# Patient Record
Sex: FEMALE | Race: WHITE | NOT HISPANIC OR LATINO
[De-identification: names, ages, dates, MRNs, and addresses within clinical notes are randomized per-mention and may not be internally consistent; named-entity substitution may affect disease eponyms.]

---

## 2017-04-19 ENCOUNTER — APPOINTMENT (OUTPATIENT)
Dept: HEMATOLOGY ONCOLOGY | Facility: CLINIC | Age: 82
End: 2017-04-19

## 2017-04-21 ENCOUNTER — APPOINTMENT (OUTPATIENT)
Dept: HEMATOLOGY ONCOLOGY | Facility: CLINIC | Age: 82
End: 2017-04-21

## 2017-04-21 ENCOUNTER — LABORATORY RESULT (OUTPATIENT)
Age: 82
End: 2017-04-21

## 2017-04-21 VITALS
SYSTOLIC BLOOD PRESSURE: 154 MMHG | WEIGHT: 158 LBS | HEART RATE: 82 BPM | HEIGHT: 61.5 IN | DIASTOLIC BLOOD PRESSURE: 81 MMHG | BODY MASS INDEX: 29.45 KG/M2 | TEMPERATURE: 98 F

## 2017-04-21 LAB
HCT VFR BLD CALC: 40 %
HGB BLD-MCNC: 12.8 G/DL
MCHC RBC-ENTMCNC: 30.9 PG
MCHC RBC-ENTMCNC: 32.1 GM/DL
MCV RBC AUTO: 96.1 FL
PLATELET # BLD AUTO: 290 K/UL
RBC # BLD: 4.16 M/UL
RBC # FLD: 12.5 %
WBC # FLD AUTO: 8.2 K/UL

## 2017-04-24 LAB
ALBUMIN MFR SERPL ELPH: 49.2 %
ALBUMIN SERPL ELPH-MCNC: 4.2 G/DL
ALBUMIN SERPL-MCNC: 4 G/DL
ALBUMIN/GLOB SERPL: 1 RATIO
ALP BLD-CCNC: 60 U/L
ALPHA1 GLOB MFR SERPL ELPH: 3.8 %
ALPHA1 GLOB SERPL ELPH-MCNC: 0.3 G/DL
ALPHA2 GLOB MFR SERPL ELPH: 12.9 %
ALPHA2 GLOB SERPL ELPH-MCNC: 1 G/DL
ALT SERPL-CCNC: 22 U/L
ANION GAP SERPL CALC-SCNC: 18 MMOL/L
AST SERPL-CCNC: 20 U/L
B-GLOBULIN MFR SERPL ELPH: 9 %
B-GLOBULIN SERPL ELPH-MCNC: 0.7 G/DL
BILIRUB SERPL-MCNC: 0.5 MG/DL
BUN SERPL-MCNC: 33 MG/DL
CALCIUM SERPL-MCNC: 10.6 MG/DL
CHLORIDE SERPL-SCNC: 102 MMOL/L
CO2 SERPL-SCNC: 23 MMOL/L
CREAT SERPL-MCNC: 0.88 MG/DL
DEPRECATED KAPPA LC FREE/LAMBDA SER: 1.8 RATIO
DEPRECATED KAPPA LC FREE/LAMBDA SER: 1.8 RATIO
GAMMA GLOB FLD ELPH-MCNC: 2 G/DL
GAMMA GLOB MFR SERPL ELPH: 25.1 %
GLUCOSE SERPL-MCNC: 89 MG/DL
IGA SER QL IEP: 117 MG/DL
IGG SER QL IEP: 923 MG/DL
IGM SER QL IEP: 1670 MG/DL
INTERPRETATION SERPL IEP-IMP: NORMAL
KAPPA LC CSF-MCNC: 1.13 MG/DL
KAPPA LC CSF-MCNC: 1.13 MG/DL
KAPPA LC SERPL-MCNC: 2.03 MG/DL
KAPPA LC SERPL-MCNC: 2.03 MG/DL
M PROTEIN MFR SERPL ELPH: NORMAL %
M PROTEIN SPEC IFE-MCNC: NORMAL
MONOCLON BAND OBS SERPL: NORMAL G/DL
POTASSIUM SERPL-SCNC: 4.8 MMOL/L
PROT SERPL-MCNC: 8.1 G/DL
SODIUM SERPL-SCNC: 143 MMOL/L

## 2017-04-26 ENCOUNTER — APPOINTMENT (OUTPATIENT)
Dept: HEMATOLOGY ONCOLOGY | Facility: CLINIC | Age: 82
End: 2017-04-26

## 2017-04-26 VITALS
TEMPERATURE: 97.9 F | HEIGHT: 61.5 IN | HEART RATE: 86 BPM | WEIGHT: 159 LBS | SYSTOLIC BLOOD PRESSURE: 162 MMHG | BODY MASS INDEX: 29.64 KG/M2 | DIASTOLIC BLOOD PRESSURE: 70 MMHG

## 2017-10-20 ENCOUNTER — LABORATORY RESULT (OUTPATIENT)
Age: 82
End: 2017-10-20

## 2017-10-20 ENCOUNTER — APPOINTMENT (OUTPATIENT)
Dept: HEMATOLOGY ONCOLOGY | Facility: CLINIC | Age: 82
End: 2017-10-20
Payer: MEDICARE

## 2017-10-20 VITALS
HEIGHT: 61.5 IN | HEART RATE: 110 BPM | WEIGHT: 157.38 LBS | DIASTOLIC BLOOD PRESSURE: 83 MMHG | BODY MASS INDEX: 29.34 KG/M2 | SYSTOLIC BLOOD PRESSURE: 166 MMHG | TEMPERATURE: 97.4 F

## 2017-10-20 LAB
HCT VFR BLD CALC: 40.2 %
HGB BLD-MCNC: 13.8 G/DL
MCHC RBC-ENTMCNC: 32.9 PG
MCHC RBC-ENTMCNC: 34.4 GM/DL
MCV RBC AUTO: 95.5 FL
PLATELET # BLD AUTO: 316 K/UL
RBC # BLD: 4.2 M/UL
RBC # FLD: 13 %
WBC # FLD AUTO: 9 K/UL

## 2017-10-20 PROCEDURE — 36415 COLL VENOUS BLD VENIPUNCTURE: CPT

## 2017-10-20 PROCEDURE — 85025 COMPLETE CBC W/AUTO DIFF WBC: CPT

## 2017-10-23 LAB
ALBUMIN MFR SERPL ELPH: 48.1 %
ALBUMIN SERPL ELPH-MCNC: 4.3 G/DL
ALBUMIN SERPL-MCNC: 3.9 G/DL
ALBUMIN/GLOB SERPL: 0.9 RATIO
ALP BLD-CCNC: 71 U/L
ALPHA1 GLOB MFR SERPL ELPH: 3.7 %
ALPHA1 GLOB SERPL ELPH-MCNC: 0.3 G/DL
ALPHA2 GLOB MFR SERPL ELPH: 12.3 %
ALPHA2 GLOB SERPL ELPH-MCNC: 1 G/DL
ALT SERPL-CCNC: 19 U/L
ANION GAP SERPL CALC-SCNC: 14 MMOL/L
AST SERPL-CCNC: 14 U/L
B-GLOBULIN MFR SERPL ELPH: 9 %
B-GLOBULIN SERPL ELPH-MCNC: 0.7 G/DL
BILIRUB SERPL-MCNC: 0.6 MG/DL
BUN SERPL-MCNC: 23 MG/DL
CALCIUM SERPL-MCNC: 10.6 MG/DL
CHLORIDE SERPL-SCNC: 100 MMOL/L
CO2 SERPL-SCNC: 28 MMOL/L
CREAT SERPL-MCNC: 0.82 MG/DL
DEPRECATED KAPPA LC FREE/LAMBDA SER: 2.25 RATIO
DEPRECATED KAPPA LC FREE/LAMBDA SER: 2.25 RATIO
FREE KAPPA URINE: 167 MG/L
FREE KAPPA/LAMDA RATIO: 69.87
FREE LAMDA URINE: 2.39 MG/L
GAMMA GLOB FLD ELPH-MCNC: 2.2 G/DL
GAMMA GLOB MFR SERPL ELPH: 26.9 %
GLUCOSE SERPL-MCNC: 125 MG/DL
IGA SER QL IEP: 125 MG/DL
IGG SER QL IEP: 1100 MG/DL
IGM SER QL IEP: 1670 MG/DL
INTERPRETATION SERPL IEP-IMP: NORMAL
KAPPA LC CSF-MCNC: 1.05 MG/DL
KAPPA LC CSF-MCNC: 1.05 MG/DL
KAPPA LC SERPL-MCNC: 2.36 MG/DL
KAPPA LC SERPL-MCNC: 2.36 MG/DL
M PROTEIN MFR SERPL ELPH: NORMAL %
M PROTEIN SPEC IFE-MCNC: NORMAL
MONOCLON BAND OBS SERPL: NORMAL G/DL
POTASSIUM SERPL-SCNC: 5.6 MMOL/L
PROT SERPL-MCNC: 8.2 G/DL
SODIUM SERPL-SCNC: 142 MMOL/L

## 2017-10-27 ENCOUNTER — APPOINTMENT (OUTPATIENT)
Dept: HEMATOLOGY ONCOLOGY | Facility: CLINIC | Age: 82
End: 2017-10-27
Payer: MEDICARE

## 2017-10-27 VITALS
WEIGHT: 157 LBS | SYSTOLIC BLOOD PRESSURE: 163 MMHG | DIASTOLIC BLOOD PRESSURE: 86 MMHG | BODY MASS INDEX: 29.26 KG/M2 | HEIGHT: 61.5 IN | HEART RATE: 84 BPM | TEMPERATURE: 98.1 F

## 2017-10-27 PROCEDURE — 99214 OFFICE O/P EST MOD 30 MIN: CPT

## 2017-10-27 RX ORDER — ATORVASTATIN CALCIUM 20 MG/1
20 TABLET, FILM COATED ORAL
Qty: 90 | Refills: 0 | Status: DISCONTINUED | COMMUNITY
Start: 2017-07-18

## 2017-10-27 RX ORDER — DILTIAZEM HYDROCHLORIDE 240 MG/1
240 CAPSULE, EXTENDED RELEASE ORAL
Qty: 30 | Refills: 0 | Status: DISCONTINUED | COMMUNITY
Start: 2017-10-02

## 2017-10-27 RX ORDER — VALSARTAN AND HYDROCHLOROTHIAZIDE 320; 12.5 MG/1; MG/1
320-12.5 TABLET, FILM COATED ORAL
Qty: 90 | Refills: 0 | Status: DISCONTINUED | COMMUNITY
Start: 2017-09-12

## 2018-04-20 ENCOUNTER — APPOINTMENT (OUTPATIENT)
Dept: HEMATOLOGY ONCOLOGY | Facility: CLINIC | Age: 83
End: 2018-04-20
Payer: MEDICARE

## 2018-04-20 ENCOUNTER — LABORATORY RESULT (OUTPATIENT)
Age: 83
End: 2018-04-20

## 2018-04-20 VITALS
WEIGHT: 162.5 LBS | BODY MASS INDEX: 30.29 KG/M2 | DIASTOLIC BLOOD PRESSURE: 74 MMHG | HEART RATE: 79 BPM | TEMPERATURE: 97.7 F | SYSTOLIC BLOOD PRESSURE: 162 MMHG | HEIGHT: 61.5 IN

## 2018-04-20 LAB
HCT VFR BLD CALC: 42.5 %
HGB BLD-MCNC: 13.8 G/DL
MCHC RBC-ENTMCNC: 31.6 PG
MCHC RBC-ENTMCNC: 32.5 GM/DL
MCV RBC AUTO: 97.1 FL
PLATELET # BLD AUTO: 291 K/UL
RBC # BLD: 4.37 M/UL
RBC # FLD: 12.5 %
WBC # FLD AUTO: 7.9 K/UL

## 2018-04-20 PROCEDURE — 85025 COMPLETE CBC W/AUTO DIFF WBC: CPT

## 2018-04-20 PROCEDURE — 36415 COLL VENOUS BLD VENIPUNCTURE: CPT

## 2018-04-23 LAB
ALBUMIN MFR SERPL ELPH: 50 %
ALBUMIN SERPL ELPH-MCNC: 4.2 G/DL
ALBUMIN SERPL-MCNC: 4 G/DL
ALBUMIN/GLOB SERPL: 1 RATIO
ALP BLD-CCNC: 56 U/L
ALPHA1 GLOB MFR SERPL ELPH: 3.6 %
ALPHA1 GLOB SERPL ELPH-MCNC: 0.3 G/DL
ALPHA2 GLOB MFR SERPL ELPH: 12.4 %
ALPHA2 GLOB SERPL ELPH-MCNC: 1 G/DL
ALT SERPL-CCNC: 19 U/L
ANION GAP SERPL CALC-SCNC: 12 MMOL/L
AST SERPL-CCNC: 20 U/L
B-GLOBULIN MFR SERPL ELPH: 9.1 %
B-GLOBULIN SERPL ELPH-MCNC: 0.7 G/DL
BILIRUB SERPL-MCNC: 0.6 MG/DL
BUN SERPL-MCNC: 27 MG/DL
CALCIUM SERPL-MCNC: 11 MG/DL
CHLORIDE SERPL-SCNC: 102 MMOL/L
CO2 SERPL-SCNC: 29 MMOL/L
CREAT SERPL-MCNC: 0.93 MG/DL
DEPRECATED KAPPA LC FREE/LAMBDA SER: 1.59 RATIO
DEPRECATED KAPPA LC FREE/LAMBDA SER: 1.59 RATIO
FREE KAPPA URINE: 83.4 MG/L
FREE KAPPA/LAMDA RATIO: 68.36
FREE LAMDA URINE: 1.22 MG/L
GAMMA GLOB FLD ELPH-MCNC: 2 G/DL
GAMMA GLOB MFR SERPL ELPH: 24.9 %
GLUCOSE SERPL-MCNC: 120 MG/DL
IGA SER QL IEP: 110 MG/DL
IGG SER QL IEP: 1060 MG/DL
IGM SER QL IEP: 1610 MG/DL
INTERPRETATION SERPL IEP-IMP: NORMAL
KAPPA LC CSF-MCNC: 1.11 MG/DL
KAPPA LC CSF-MCNC: 1.11 MG/DL
KAPPA LC SERPL-MCNC: 1.76 MG/DL
KAPPA LC SERPL-MCNC: 1.76 MG/DL
M PROTEIN MFR SERPL ELPH: NORMAL %
M PROTEIN SPEC IFE-MCNC: NORMAL
MONOCLON BAND OBS SERPL: NORMAL G/DL
POTASSIUM SERPL-SCNC: 5 MMOL/L
PROT SERPL-MCNC: 8.1 G/DL
SODIUM SERPL-SCNC: 143 MMOL/L

## 2018-04-27 ENCOUNTER — APPOINTMENT (OUTPATIENT)
Dept: HEMATOLOGY ONCOLOGY | Facility: CLINIC | Age: 83
End: 2018-04-27
Payer: MEDICARE

## 2018-04-27 VITALS
WEIGHT: 161.8 LBS | BODY MASS INDEX: 30.16 KG/M2 | TEMPERATURE: 97.6 F | HEART RATE: 80 BPM | HEIGHT: 61.5 IN | SYSTOLIC BLOOD PRESSURE: 170 MMHG | DIASTOLIC BLOOD PRESSURE: 85 MMHG

## 2018-04-27 DIAGNOSIS — E83.52 HYPERCALCEMIA: ICD-10-CM

## 2018-04-27 DIAGNOSIS — E78.5 HYPERLIPIDEMIA, UNSPECIFIED: ICD-10-CM

## 2018-04-27 DIAGNOSIS — I10 ESSENTIAL (PRIMARY) HYPERTENSION: ICD-10-CM

## 2018-04-27 PROCEDURE — 99214 OFFICE O/P EST MOD 30 MIN: CPT

## 2018-04-27 RX ORDER — AZITHROMYCIN 250 MG/1
250 TABLET, FILM COATED ORAL
Qty: 6 | Refills: 0 | Status: DISCONTINUED | COMMUNITY
Start: 2017-10-28

## 2018-04-27 RX ORDER — ALBUTEROL SULFATE 90 UG/1
108 (90 BASE) AEROSOL, METERED RESPIRATORY (INHALATION)
Qty: 18 | Refills: 0 | Status: DISCONTINUED | COMMUNITY
Start: 2017-10-31

## 2018-04-27 RX ORDER — PREDNISONE 10 MG/1
10 TABLET ORAL
Qty: 30 | Refills: 0 | Status: DISCONTINUED | COMMUNITY
Start: 2017-10-31

## 2018-04-27 RX ORDER — LEVOFLOXACIN 250 MG/1
250 TABLET, FILM COATED ORAL
Qty: 10 | Refills: 0 | Status: DISCONTINUED | COMMUNITY
Start: 2017-10-31

## 2018-04-27 RX ORDER — AMOXICILLIN AND CLAVULANATE POTASSIUM 875; 125 MG/1; MG/1
875-125 TABLET, COATED ORAL
Qty: 20 | Refills: 0 | Status: DISCONTINUED | COMMUNITY
Start: 2017-10-31

## 2018-10-29 ENCOUNTER — LABORATORY RESULT (OUTPATIENT)
Age: 83
End: 2018-10-29

## 2018-10-29 ENCOUNTER — APPOINTMENT (OUTPATIENT)
Dept: HEMATOLOGY ONCOLOGY | Facility: CLINIC | Age: 83
End: 2018-10-29
Payer: MEDICARE

## 2018-10-29 VITALS — DIASTOLIC BLOOD PRESSURE: 71 MMHG | SYSTOLIC BLOOD PRESSURE: 187 MMHG | TEMPERATURE: 97.8 F | HEART RATE: 56 BPM

## 2018-10-29 LAB
HCT VFR BLD CALC: 40.4 %
HGB BLD-MCNC: 13.2 G/DL
MCHC RBC-ENTMCNC: 30.9 PG
MCHC RBC-ENTMCNC: 32.8 GM/DL
MCV RBC AUTO: 94.4 FL
PLATELET # BLD AUTO: 298 K/UL
RBC # BLD: 4.28 M/UL
RBC # FLD: 13.4 %
WBC # FLD AUTO: 7 K/UL

## 2018-10-29 PROCEDURE — 36415 COLL VENOUS BLD VENIPUNCTURE: CPT | Mod: Q5

## 2018-10-29 PROCEDURE — 85025 COMPLETE CBC W/AUTO DIFF WBC: CPT | Mod: Q5

## 2018-10-30 LAB
ALBUMIN MFR SERPL ELPH: 48.1 %
ALBUMIN SERPL ELPH-MCNC: 4.4 G/DL
ALBUMIN SERPL-MCNC: 3.9 G/DL
ALBUMIN/GLOB SERPL: 0.9 RATIO
ALP BLD-CCNC: 68 U/L
ALPHA1 GLOB MFR SERPL ELPH: 3.9 %
ALPHA1 GLOB SERPL ELPH-MCNC: 0.3 G/DL
ALPHA2 GLOB MFR SERPL ELPH: 12.8 %
ALPHA2 GLOB SERPL ELPH-MCNC: 1 G/DL
ALT SERPL-CCNC: 20 U/L
ANION GAP SERPL CALC-SCNC: 10 MMOL/L
AST SERPL-CCNC: 15 U/L
B-GLOBULIN MFR SERPL ELPH: 9.1 %
B-GLOBULIN SERPL ELPH-MCNC: 0.7 G/DL
BILIRUB SERPL-MCNC: 0.5 MG/DL
BUN SERPL-MCNC: 22 MG/DL
CALCIUM SERPL-MCNC: 10.4 MG/DL
CHLORIDE SERPL-SCNC: 103 MMOL/L
CO2 SERPL-SCNC: 28 MMOL/L
CREAT SERPL-MCNC: 0.75 MG/DL
DEPRECATED KAPPA LC FREE/LAMBDA SER: 2.29 RATIO
DEPRECATED KAPPA LC FREE/LAMBDA SER: 2.29 RATIO
GAMMA GLOB FLD ELPH-MCNC: 2.1 G/DL
GAMMA GLOB MFR SERPL ELPH: 26.1 %
GLUCOSE SERPL-MCNC: 74 MG/DL
IGA SER QL IEP: 103 MG/DL
IGG SER QL IEP: 947 MG/DL
IGM SER QL IEP: 1664 MG/DL
INTERPRETATION SERPL IEP-IMP: NORMAL
KAPPA LC CSF-MCNC: 0.79 MG/DL
KAPPA LC CSF-MCNC: 0.79 MG/DL
KAPPA LC SERPL-MCNC: 1.81 MG/DL
KAPPA LC SERPL-MCNC: 1.81 MG/DL
M PROTEIN MFR SERPL ELPH: NORMAL %
M PROTEIN SPEC IFE-MCNC: NORMAL
MONOCLON BAND OBS SERPL: NORMAL G/DL
POTASSIUM SERPL-SCNC: 4.3 MMOL/L
PROT SERPL-MCNC: 8.1 G/DL
SODIUM SERPL-SCNC: 141 MMOL/L

## 2018-11-02 ENCOUNTER — APPOINTMENT (OUTPATIENT)
Dept: HEMATOLOGY ONCOLOGY | Facility: CLINIC | Age: 83
End: 2018-11-02
Payer: MEDICARE

## 2018-11-02 VITALS
TEMPERATURE: 98 F | DIASTOLIC BLOOD PRESSURE: 75 MMHG | HEART RATE: 66 BPM | BODY MASS INDEX: 29.26 KG/M2 | WEIGHT: 157 LBS | SYSTOLIC BLOOD PRESSURE: 172 MMHG | HEIGHT: 61.5 IN

## 2018-11-02 PROCEDURE — 99214 OFFICE O/P EST MOD 30 MIN: CPT

## 2018-11-02 NOTE — REASON FOR VISIT
[Follow-Up Visit] : a follow-up visit for [Family Member] : family member [FreeTextEntry2] : IgM monoclonal gammopathy

## 2018-11-02 NOTE — ASSESSMENT
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 \par Clinically stable and asymptomatic. IgM -  unchanged compared to April 2017.\par \par Continue clinical monitoring with periodic blood work/ exam every 6 months.\par \par She will be going to [place] in winter. \par \par Return visit in 6 months ( April 2019) .  Labs before next appointment ( CBC, CMP, QIGs, FLC). \par \par Discussed with the patient and her daughter-in-law.

## 2018-11-02 NOTE — HISTORY OF PRESENT ILLNESS
[Disease:__________________________] : Disease: [unfilled] [de-identified] : Presented with low level monoclonal IgM on blood work in 2014 done for evaluation of chronic back pain ( evaluated- non- malignant causes). Monitored. No anemia, no adenopathy or splenomegaly. \par \par Today for a routine follow up.  [de-identified] : Feels very well. Good energy. No systemic symptoms. Lost few lbs in summer- was very active, doing a lot. Great appetite, no GI c/o.  \par

## 2018-11-02 NOTE — PHYSICAL EXAM
[Fully active, able to carry on all pre-disease performance without restriction] : Status 0 - Fully active, able to carry on all pre-disease performance without restriction [Normal] : supple without JVD, no thyromegaly or masses appreciated [de-identified] : looks well

## 2019-04-26 ENCOUNTER — LABORATORY RESULT (OUTPATIENT)
Age: 84
End: 2019-04-26

## 2019-04-26 ENCOUNTER — APPOINTMENT (OUTPATIENT)
Dept: HEMATOLOGY ONCOLOGY | Facility: CLINIC | Age: 84
End: 2019-04-26
Payer: MEDICARE

## 2019-04-26 VITALS
WEIGHT: 152 LBS | SYSTOLIC BLOOD PRESSURE: 150 MMHG | HEIGHT: 61.5 IN | BODY MASS INDEX: 28.33 KG/M2 | HEART RATE: 62 BPM | DIASTOLIC BLOOD PRESSURE: 68 MMHG | TEMPERATURE: 97.7 F

## 2019-04-26 PROCEDURE — 36415 COLL VENOUS BLD VENIPUNCTURE: CPT

## 2019-04-26 PROCEDURE — 85025 COMPLETE CBC W/AUTO DIFF WBC: CPT

## 2019-04-30 LAB
ALBUMIN MFR SERPL ELPH: 48.3 %
ALBUMIN SERPL ELPH-MCNC: 4.2 G/DL
ALBUMIN SERPL-MCNC: 3.8 G/DL
ALBUMIN/GLOB SERPL: 0.9 RATIO
ALP BLD-CCNC: 55 U/L
ALPHA1 GLOB MFR SERPL ELPH: 3.8 %
ALPHA1 GLOB SERPL ELPH-MCNC: 0.3 G/DL
ALPHA2 GLOB MFR SERPL ELPH: 11.9 %
ALPHA2 GLOB SERPL ELPH-MCNC: 0.9 G/DL
ALT SERPL-CCNC: 19 U/L
ANION GAP SERPL CALC-SCNC: 9 MMOL/L
AST SERPL-CCNC: 16 U/L
B-GLOBULIN MFR SERPL ELPH: 8.7 %
B-GLOBULIN SERPL ELPH-MCNC: 0.7 G/DL
BILIRUB SERPL-MCNC: 0.5 MG/DL
BUN SERPL-MCNC: 24 MG/DL
CALCIUM SERPL-MCNC: 10.5 MG/DL
CHLORIDE SERPL-SCNC: 100 MMOL/L
CO2 SERPL-SCNC: 30 MMOL/L
CREAT SERPL-MCNC: 0.72 MG/DL
DEPRECATED KAPPA LC FREE/LAMBDA SER: 3.64 RATIO
GAMMA GLOB FLD ELPH-MCNC: 2.2 G/DL
GAMMA GLOB MFR SERPL ELPH: 27.3 %
GLUCOSE SERPL-MCNC: 122 MG/DL
HCT VFR BLD CALC: 38.4 %
HGB BLD-MCNC: 12.7 G/DL
IGA SER QL IEP: 86 MG/DL
IGG SER QL IEP: 726 MG/DL
IGM SER QL IEP: 1979 MG/DL
INTERPRETATION SERPL IEP-IMP: NORMAL
KAPPA LC CSF-MCNC: 0.75 MG/DL
KAPPA LC SERPL-MCNC: 2.73 MG/DL
M PROTEIN MFR SERPL ELPH: NORMAL
M PROTEIN SPEC IFE-MCNC: NORMAL
MCHC RBC-ENTMCNC: 31.9 PG
MCHC RBC-ENTMCNC: 33 GM/DL
MCV RBC AUTO: 96.8 FL
MONOCLON BAND OBS SERPL: NORMAL
PLATELET # BLD AUTO: 296 K/UL
POTASSIUM SERPL-SCNC: 4.8 MMOL/L
PROT SERPL-MCNC: 7.9 G/DL
RBC # BLD: 3.97 M/UL
RBC # FLD: 13.2 %
SODIUM SERPL-SCNC: 139 MMOL/L
WBC # FLD AUTO: 7.7 K/UL

## 2019-05-03 ENCOUNTER — APPOINTMENT (OUTPATIENT)
Dept: HEMATOLOGY ONCOLOGY | Facility: CLINIC | Age: 84
End: 2019-05-03
Payer: MEDICARE

## 2019-05-03 VITALS
BODY MASS INDEX: 27.21 KG/M2 | DIASTOLIC BLOOD PRESSURE: 69 MMHG | TEMPERATURE: 97.5 F | HEIGHT: 61.5 IN | SYSTOLIC BLOOD PRESSURE: 136 MMHG | WEIGHT: 146 LBS | HEART RATE: 61 BPM

## 2019-05-03 PROCEDURE — 99214 OFFICE O/P EST MOD 30 MIN: CPT

## 2019-05-03 RX ORDER — VIT A/VIT C/VIT E/ZINC/COPPER 4296-226
CAPSULE ORAL
Refills: 0 | Status: ACTIVE | COMMUNITY
Start: 2019-05-03

## 2019-05-03 NOTE — ASSESSMENT
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 \par Clinically stable and asymptomatic. IgM -  only minimally increased compared to last year.\par \par Continue clinical monitoring with periodic blood work/ exam every 6 months.\par \par \par Return visit in 6 months .  Labs before next appointment ( CBC, CMP, QIGs, FLC). \par \par Discussed with the patient and her daughter-in-law.

## 2019-05-03 NOTE — PHYSICAL EXAM
[Fully active, able to carry on all pre-disease performance without restriction] : Status 0 - Fully active, able to carry on all pre-disease performance without restriction [Normal] : affect appropriate [de-identified] : looks well

## 2019-05-03 NOTE — HISTORY OF PRESENT ILLNESS
[Disease:__________________________] : Disease: [unfilled] [de-identified] : Presented with low level monoclonal IgM on blood work in 2014 done for evaluation of chronic back pain ( evaluated- non- malignant causes). Monitored. No anemia, no adenopathy or splenomegaly. \par \par Today for a routine follow up.  [de-identified] : Feels very well. Good energy. No systemic symptoms. Lost few lbs in few months. She has  great appetite and no GI complaints but changed cooking/ smaller meals since her   was   ill and had to adjust to  his diet.   \par

## 2019-09-03 ENCOUNTER — OTHER (OUTPATIENT)
Age: 84
End: 2019-09-03

## 2019-10-30 ENCOUNTER — APPOINTMENT (OUTPATIENT)
Dept: HEMATOLOGY ONCOLOGY | Facility: CLINIC | Age: 84
End: 2019-10-30

## 2019-11-06 ENCOUNTER — APPOINTMENT (OUTPATIENT)
Dept: HEMATOLOGY ONCOLOGY | Facility: CLINIC | Age: 84
End: 2019-11-06

## 2020-12-03 ENCOUNTER — OUTPATIENT (OUTPATIENT)
Dept: OUTPATIENT SERVICES | Facility: HOSPITAL | Age: 85
LOS: 1 days | Discharge: HOME | End: 2020-12-03

## 2020-12-03 DIAGNOSIS — D47.2 MONOCLONAL GAMMOPATHY: ICD-10-CM

## 2020-12-04 ENCOUNTER — APPOINTMENT (OUTPATIENT)
Dept: HEMATOLOGY ONCOLOGY | Facility: CLINIC | Age: 85
End: 2020-12-04
Payer: MEDICARE

## 2020-12-04 ENCOUNTER — RESULT REVIEW (OUTPATIENT)
Age: 85
End: 2020-12-04

## 2020-12-04 PROCEDURE — 99499A: CUSTOM | Mod: NC

## 2020-12-11 ENCOUNTER — APPOINTMENT (OUTPATIENT)
Dept: HEMATOLOGY ONCOLOGY | Facility: CLINIC | Age: 85
End: 2020-12-11
Payer: MEDICARE

## 2020-12-11 VITALS
BODY MASS INDEX: 27.58 KG/M2 | HEART RATE: 75 BPM | TEMPERATURE: 97.5 F | SYSTOLIC BLOOD PRESSURE: 153 MMHG | HEIGHT: 61.5 IN | DIASTOLIC BLOOD PRESSURE: 70 MMHG | WEIGHT: 148 LBS | RESPIRATION RATE: 16 BRPM

## 2020-12-11 PROCEDURE — 99215 OFFICE O/P EST HI 40 MIN: CPT

## 2020-12-11 RX ORDER — PANTOPRAZOLE 40 MG/1
40 TABLET, DELAYED RELEASE ORAL
Refills: 1 | Status: DISCONTINUED | COMMUNITY
Start: 2018-11-02 | End: 2020-12-11

## 2020-12-11 NOTE — HISTORY OF PRESENT ILLNESS
[Disease:__________________________] : Disease: [unfilled] [de-identified] : Presented with low level monoclonal IgM on blood work in 2014 done for evaluation of chronic back pain ( evaluated- non- malignant causes). Monitored. No anemia, no adenopathy or splenomegaly. \par \par Today for a routine follow up. Her last visit here was  1 1/2 yr ago . [de-identified] : Feels well. Good energy. No systemic symptoms. She is active and does herself all the work in the house-- cleaning and cooking. She drives.

## 2020-12-11 NOTE — ASSESSMENT
[FreeTextEntry1] : 84 y/o woman diagnosed with low level monoclonal IgM gammopathy  in May 2014. Monitored -stable over course of 5 years but now her condition progressed within last 1 1/2 yrs. She developed anemia and her IgM level increased significantly.\par She remains asymptomatic but needs to start treatment to prevent progression and symptoms.\par Clinical presentation is consistent with Waldenstrom MAcroglobulinemia versus IgM myeloma (  much less common).\par Needs bone marrow biopsy for diagnosis and prognostic studies.\par Explained bone marrow biopsy procedure. \par \par She will return next week for bone marrow biopsy.\par \par Discussed with the patient and her daughter on the phone. Answered  general questions re treatment options/ outcomes. \par

## 2020-12-11 NOTE — PHYSICAL EXAM
[Fully active, able to carry on all pre-disease performance without restriction] : Status 0 - Fully active, able to carry on all pre-disease performance without restriction [Normal] : affect appropriate [de-identified] : looks well

## 2020-12-15 ENCOUNTER — LABORATORY RESULT (OUTPATIENT)
Age: 85
End: 2020-12-15

## 2020-12-15 ENCOUNTER — OUTPATIENT (OUTPATIENT)
Dept: OUTPATIENT SERVICES | Facility: HOSPITAL | Age: 85
LOS: 1 days | End: 2020-12-15

## 2020-12-15 ENCOUNTER — APPOINTMENT (OUTPATIENT)
Dept: HEMATOLOGY ONCOLOGY | Facility: CLINIC | Age: 85
End: 2020-12-15
Payer: MEDICARE

## 2020-12-15 VITALS
BODY MASS INDEX: 27.58 KG/M2 | WEIGHT: 148 LBS | TEMPERATURE: 98 F | HEART RATE: 80 BPM | SYSTOLIC BLOOD PRESSURE: 167 MMHG | RESPIRATION RATE: 16 BRPM | DIASTOLIC BLOOD PRESSURE: 66 MMHG | HEIGHT: 61.5 IN

## 2020-12-15 DIAGNOSIS — D47.2 MONOCLONAL GAMMOPATHY: ICD-10-CM

## 2020-12-15 PROCEDURE — 99213 OFFICE O/P EST LOW 20 MIN: CPT | Mod: 25

## 2020-12-15 PROCEDURE — 38222 DX BONE MARROW BX & ASPIR: CPT | Mod: LT

## 2020-12-15 NOTE — REASON FOR VISIT
[Procedure Visit] : a procedure visit for [FreeTextEntry2] : IgM gammopathy here for bone marrow biopsy

## 2020-12-15 NOTE — ASSESSMENT
[FreeTextEntry1] : 86 y/o woman diagnosed with low level monoclonal IgM gammopathy  in May 2014. Monitored -stable over course of 5 years but now she has progression: new anemia and her IgM level increased significantly.\par She remains asymptomatic but needs to start treatment to prevent progression and symptoms.\par Clinical presentation is consistent with Waldenstrom MAcroglobulinemia versus IgM myeloma (  much less common).\par Plan: bone marrow biopsy done today.\par \par return visit in 2 weeks to discuss results/ treatment plan \par

## 2020-12-15 NOTE — HISTORY OF PRESENT ILLNESS
[Disease:__________________________] : Disease: [unfilled] [de-identified] : Presented with low level monoclonal IgM on blood work in 2014 done for evaluation of chronic back pain ( evaluated- non- malignant causes). Monitored. No anemia, no adenopathy or splenomegaly. \par \par December 2020- routine follow up - new anemia, IgM up to > 5000 mg%. \par Today for bone marrow biopsy.. [de-identified] : Feels well. Good energy. No systemic symptoms. She is active and does herself all the work in the house-- cleaning and cooking. She drives.

## 2020-12-15 NOTE — PROCEDURE
[Bone Marrow Biopsy] : bone marrow biopsy [Bone Marrow Aspiration] : bone marrow aspiration  [Patient] : the patient [Verbal Consent Obtained] : verbal consent was obtained prior to the procedure [Patient identification verified] : patient identification verified [NA] : site: NA [Correct positioning] : correct positioning [Prone] : prone [Superior iliac spine was identified] : the superior iliac spine was identified. [The left posterior iliac crest was prepped with betadine and draped, using sterile technique.] : The left posterior iliac crest was prepped with betadine and draped, using sterile technique. [Lidocaine was injected and into the periosteum overlying the site.] : Lidocaine was injected and into the periosteum overlying the site. [Aspirate] : aspirate [Cytogenetics] : cytogenetics [FISH] : FISH [Biopsy] : biopsy [Flow Cytometry] : flow cytometry [] : The patient was instructed to remove the bandage the following AM. The patient may bathe. Acetaminophen may be taken for discomfort, as per package directions.If there are any other problems, the patient was instructed to call the office. The patient verbalized understanding, and is aware of the office contact numbers. [FreeTextEntry1] : IgM gammopathy r/o WM  R/o MM

## 2020-12-16 ENCOUNTER — LABORATORY RESULT (OUTPATIENT)
Age: 85
End: 2020-12-16

## 2020-12-18 ENCOUNTER — LABORATORY RESULT (OUTPATIENT)
Age: 85
End: 2020-12-18

## 2020-12-18 DIAGNOSIS — R11.2 NAUSEA WITH VOMITING, UNSPECIFIED: ICD-10-CM

## 2020-12-30 ENCOUNTER — APPOINTMENT (OUTPATIENT)
Dept: HEMATOLOGY ONCOLOGY | Facility: CLINIC | Age: 85
End: 2020-12-30
Payer: MEDICARE

## 2020-12-30 ENCOUNTER — RESULT REVIEW (OUTPATIENT)
Age: 85
End: 2020-12-30

## 2020-12-30 ENCOUNTER — OUTPATIENT (OUTPATIENT)
Dept: OUTPATIENT SERVICES | Facility: HOSPITAL | Age: 85
LOS: 1 days | Discharge: HOME | End: 2020-12-30

## 2020-12-30 ENCOUNTER — APPOINTMENT (OUTPATIENT)
Dept: HEMATOLOGY ONCOLOGY | Facility: CLINIC | Age: 85
End: 2020-12-30

## 2020-12-30 ENCOUNTER — LABORATORY RESULT (OUTPATIENT)
Age: 85
End: 2020-12-30

## 2020-12-30 VITALS
SYSTOLIC BLOOD PRESSURE: 161 MMHG | WEIGHT: 147.5 LBS | BODY MASS INDEX: 27.42 KG/M2 | TEMPERATURE: 97.2 F | DIASTOLIC BLOOD PRESSURE: 70 MMHG | HEART RATE: 80 BPM

## 2020-12-30 DIAGNOSIS — Z86.03 PERSONAL HISTORY OF NEOPLASM OF UNCERTAIN BEHAVIOR: ICD-10-CM

## 2020-12-30 DIAGNOSIS — D47.2 MONOCLONAL GAMMOPATHY: ICD-10-CM

## 2020-12-30 PROCEDURE — 99215 OFFICE O/P EST HI 40 MIN: CPT

## 2020-12-30 NOTE — PHYSICAL EXAM
[Normal] : affect appropriate [de-identified] : looks excellent for her age  [de-identified] : no splenomegaly  [de-identified] : no palpable adenopathy

## 2020-12-30 NOTE — HISTORY OF PRESENT ILLNESS
[Disease:__________________________] : Disease: [unfilled] [de-identified] : Presented with low level monoclonal IgM on blood work in 2014 done for evaluation of chronic back pain ( evaluated- non- malignant causes). Monitored. No anemia, no adenopathy or splenomegaly. \par \par December 2020- routine follow up - new anemia, IgM up to > 5000 mg%. 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 \par Cytogenetics and lymphoma FISH panel negative. \par  [de-identified] : Feels well. Good energy. No systemic symptoms. She is active and does herself all the work in the house-- cleaning and cooking. She drives.

## 2020-12-30 NOTE — REASON FOR VISIT
[Follow-Up Visit] : a follow-up visit for [FreeTextEntry2] : IgM gammopathy here to discuss results of  bone marrow biopsy

## 2021-01-04 ENCOUNTER — APPOINTMENT (OUTPATIENT)
Dept: HEMATOLOGY ONCOLOGY | Facility: CLINIC | Age: 86
End: 2021-01-04
Payer: MEDICARE

## 2021-01-04 DIAGNOSIS — H91.90 UNSPECIFIED HEARING LOSS, UNSPECIFIED EAR: ICD-10-CM

## 2021-01-04 DIAGNOSIS — K21.9 GASTRO-ESOPHAGEAL REFLUX DISEASE W/OUT ESOPHAGITIS: ICD-10-CM

## 2021-01-04 DIAGNOSIS — Z86.69 PERSONAL HISTORY OF OTHER DISEASES OF THE NERVOUS SYSTEM AND SENSE ORGANS: ICD-10-CM

## 2021-01-04 DIAGNOSIS — Z79.899 OTHER LONG TERM (CURRENT) DRUG THERAPY: ICD-10-CM

## 2021-01-04 PROCEDURE — 99215 OFFICE O/P EST HI 40 MIN: CPT

## 2021-01-06 ENCOUNTER — RESULT REVIEW (OUTPATIENT)
Age: 86
End: 2021-01-06

## 2021-01-06 ENCOUNTER — APPOINTMENT (OUTPATIENT)
Dept: INFUSION THERAPY | Facility: CLINIC | Age: 86
End: 2021-01-06

## 2021-01-06 VITALS
BODY MASS INDEX: 27.58 KG/M2 | SYSTOLIC BLOOD PRESSURE: 151 MMHG | HEIGHT: 61.5 IN | RESPIRATION RATE: 16 BRPM | DIASTOLIC BLOOD PRESSURE: 69 MMHG | TEMPERATURE: 97.9 F | WEIGHT: 148 LBS | HEART RATE: 76 BPM

## 2021-01-06 LAB
BASOPHILS # BLD AUTO: 0.02 K/UL — SIGNIFICANT CHANGE UP (ref 0–0.2)
BASOPHILS NFR BLD AUTO: 0.3 % — SIGNIFICANT CHANGE UP (ref 0–2)
EOSINOPHIL # BLD AUTO: 0.01 K/UL — SIGNIFICANT CHANGE UP (ref 0–0.5)
EOSINOPHIL NFR BLD AUTO: 0.1 % — SIGNIFICANT CHANGE UP (ref 0–6)
HCT VFR BLD CALC: 29.1 % — LOW (ref 34.5–45)
HGB BLD-MCNC: 9.6 G/DL — LOW (ref 11.5–15.5)
IMM GRANULOCYTES NFR BLD AUTO: 1.6 % — HIGH (ref 0–1.5)
LYMPHOCYTES # BLD AUTO: 1.64 K/UL — SIGNIFICANT CHANGE UP (ref 1–3.3)
LYMPHOCYTES # BLD AUTO: 24 % — SIGNIFICANT CHANGE UP (ref 13–44)
MCHC RBC-ENTMCNC: 30.7 PG — SIGNIFICANT CHANGE UP (ref 27–34)
MCHC RBC-ENTMCNC: 33 GM/DL — SIGNIFICANT CHANGE UP (ref 32–36)
MCV RBC AUTO: 93 FL — SIGNIFICANT CHANGE UP (ref 80–100)
MONOCYTES # BLD AUTO: 0.33 K/UL — SIGNIFICANT CHANGE UP (ref 0–0.9)
MONOCYTES NFR BLD AUTO: 4.8 % — SIGNIFICANT CHANGE UP (ref 2–14)
NEUTROPHILS # BLD AUTO: 4.71 K/UL — SIGNIFICANT CHANGE UP (ref 1.8–7.4)
NEUTROPHILS NFR BLD AUTO: 69.2 % — SIGNIFICANT CHANGE UP (ref 43–77)
NRBC # BLD: 0 /100 WBCS — SIGNIFICANT CHANGE UP (ref 0–0)
PLATELET # BLD AUTO: 318 K/UL — SIGNIFICANT CHANGE UP (ref 150–400)
RBC # BLD: 3.13 M/UL — LOW (ref 3.8–5.2)
RBC # FLD: 16.9 % — HIGH (ref 10.3–14.5)
WBC # BLD: 6.82 K/UL — SIGNIFICANT CHANGE UP (ref 3.8–10.5)
WBC # FLD AUTO: 6.82 K/UL — SIGNIFICANT CHANGE UP (ref 3.8–10.5)

## 2021-01-07 DIAGNOSIS — C88.0 WALDENSTROM MACROGLOBULINEMIA: ICD-10-CM

## 2021-01-07 DIAGNOSIS — Z51.11 ENCOUNTER FOR ANTINEOPLASTIC CHEMOTHERAPY: ICD-10-CM

## 2021-01-12 ENCOUNTER — NON-APPOINTMENT (OUTPATIENT)
Age: 86
End: 2021-01-12

## 2021-01-13 ENCOUNTER — RESULT REVIEW (OUTPATIENT)
Age: 86
End: 2021-01-13

## 2021-01-13 ENCOUNTER — APPOINTMENT (OUTPATIENT)
Dept: INFUSION THERAPY | Facility: CLINIC | Age: 86
End: 2021-01-13

## 2021-01-13 VITALS
HEART RATE: 73 BPM | DIASTOLIC BLOOD PRESSURE: 68 MMHG | BODY MASS INDEX: 27.58 KG/M2 | SYSTOLIC BLOOD PRESSURE: 156 MMHG | WEIGHT: 148 LBS | TEMPERATURE: 97.7 F | RESPIRATION RATE: 16 BRPM | HEIGHT: 61.5 IN

## 2021-01-13 LAB
BASOPHILS # BLD AUTO: 0.01 K/UL — SIGNIFICANT CHANGE UP (ref 0–0.2)
BASOPHILS NFR BLD AUTO: 0.1 % — SIGNIFICANT CHANGE UP (ref 0–2)
EOSINOPHIL # BLD AUTO: 0.02 K/UL — SIGNIFICANT CHANGE UP (ref 0–0.5)
EOSINOPHIL NFR BLD AUTO: 0.3 % — SIGNIFICANT CHANGE UP (ref 0–6)
HCT VFR BLD CALC: 28.9 % — LOW (ref 34.5–45)
HGB BLD-MCNC: 9.7 G/DL — LOW (ref 11.5–15.5)
IMM GRANULOCYTES NFR BLD AUTO: 1.1 % — SIGNIFICANT CHANGE UP (ref 0–1.5)
LYMPHOCYTES # BLD AUTO: 1.19 K/UL — SIGNIFICANT CHANGE UP (ref 1–3.3)
LYMPHOCYTES # BLD AUTO: 17 % — SIGNIFICANT CHANGE UP (ref 13–44)
MCHC RBC-ENTMCNC: 31.2 PG — SIGNIFICANT CHANGE UP (ref 27–34)
MCHC RBC-ENTMCNC: 33.6 GM/DL — SIGNIFICANT CHANGE UP (ref 32–36)
MCV RBC AUTO: 92.9 FL — SIGNIFICANT CHANGE UP (ref 80–100)
MONOCYTES # BLD AUTO: 0.62 K/UL — SIGNIFICANT CHANGE UP (ref 0–0.9)
MONOCYTES NFR BLD AUTO: 8.8 % — SIGNIFICANT CHANGE UP (ref 2–14)
NEUTROPHILS # BLD AUTO: 5.09 K/UL — SIGNIFICANT CHANGE UP (ref 1.8–7.4)
NEUTROPHILS NFR BLD AUTO: 72.7 % — SIGNIFICANT CHANGE UP (ref 43–77)
NRBC # BLD: 0 /100 WBCS — SIGNIFICANT CHANGE UP (ref 0–0)
PLATELET # BLD AUTO: 320 K/UL — SIGNIFICANT CHANGE UP (ref 150–400)
RBC # BLD: 3.11 M/UL — LOW (ref 3.8–5.2)
RBC # FLD: 16.9 % — HIGH (ref 10.3–14.5)
WBC # BLD: 7.01 K/UL — SIGNIFICANT CHANGE UP (ref 3.8–10.5)
WBC # FLD AUTO: 7.01 K/UL — SIGNIFICANT CHANGE UP (ref 3.8–10.5)

## 2021-01-15 ENCOUNTER — OUTPATIENT (OUTPATIENT)
Dept: OUTPATIENT SERVICES | Facility: HOSPITAL | Age: 86
LOS: 1 days | Discharge: HOME | End: 2021-01-15

## 2021-01-15 DIAGNOSIS — C88.0 WALDENSTROM MACROGLOBULINEMIA: ICD-10-CM

## 2021-01-18 DIAGNOSIS — D64.9 ANEMIA, UNSPECIFIED: ICD-10-CM

## 2021-01-20 ENCOUNTER — RESULT REVIEW (OUTPATIENT)
Age: 86
End: 2021-01-20

## 2021-01-20 ENCOUNTER — APPOINTMENT (OUTPATIENT)
Dept: INFUSION THERAPY | Facility: CLINIC | Age: 86
End: 2021-01-20

## 2021-01-20 VITALS
HEART RATE: 78 BPM | WEIGHT: 147 LBS | BODY MASS INDEX: 27.4 KG/M2 | SYSTOLIC BLOOD PRESSURE: 170 MMHG | TEMPERATURE: 97.3 F | RESPIRATION RATE: 16 BRPM | DIASTOLIC BLOOD PRESSURE: 72 MMHG | HEIGHT: 61.5 IN

## 2021-01-21 DIAGNOSIS — Z51.11 ENCOUNTER FOR ANTINEOPLASTIC CHEMOTHERAPY: ICD-10-CM

## 2021-01-26 ENCOUNTER — TRANSCRIPTION ENCOUNTER (OUTPATIENT)
Age: 86
End: 2021-01-26

## 2021-01-27 ENCOUNTER — APPOINTMENT (OUTPATIENT)
Dept: HEMATOLOGY ONCOLOGY | Facility: CLINIC | Age: 86
End: 2021-01-27
Payer: MEDICARE

## 2021-01-27 DIAGNOSIS — C88.0 WALDENSTROM MACROGLOBULINEMIA: ICD-10-CM

## 2021-01-27 PROCEDURE — 99215 OFFICE O/P EST HI 40 MIN: CPT | Mod: 95

## 2021-01-28 PROBLEM — C88.0 WALDENSTROM MACROGLOBULINEMIA: Status: ACTIVE | Noted: 2020-12-11

## 2021-01-28 NOTE — HISTORY OF PRESENT ILLNESS
[Home] : at home, [unfilled] , at the time of the visit. [Medical Office: ([facility])___] : at the medical office located in  [Family Member] : family member [Verbal consent obtained from patient] : the patient, [unfilled] [Disease:__________________________] : Disease: [unfilled] [de-identified] : Presented with low level monoclonal IgM on blood work in 2014 done for evaluation of chronic back pain ( evaluated- non- malignant causes). Monitored. No anemia, no adenopathy or splenomegaly. \par \par December 2020- routine follow up - new anemia, IgM up to > 5000 mg%. 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 \par Cytogenetics and lymphoma FISH panel negative. \par \par She was asymptomatic.\par Started bortezomib/ dex weekly with plan to add rituxan when IgM < 4000g%.\par \par S/p 2 cycles. \par  [de-identified] : Feels well. Good energy. HAd some light flashes in her eyes- but states that she had them in the past.  She had no adverse reaction to  bortezomib  but did not like Dex 40 mg weekly dose ( 20 mg osmany x 2 days) - lowered to 20 mg weekly.\par \par

## 2021-01-28 NOTE — ASSESSMENT
[FreeTextEntry1] : 84 y/o woman WM- anemia and  IgM ~  5000 mg %.\par Asymptomatic- amaurosis fugax ?- per patient not new.\par \par S/p two cycles of bortezomib/ dex without improvement in IgM levels.\par \par Discussed options : add cytoxan ( CyborD) or change therapy.\par \par Plan : add cytoxan weekly ( CyBorD) for 2-3 cycles until IgM < 400 mg- will change to Velcade/ Dex/ Rituxan at that point.\par \par \par patient reluctant to do any changes but she might agree- will discuss with her family.\par \par She will increase dex dose back to 40 mg weekly for next two cycles\par \par \par Long discussion  with the patient and her daughter Mary Kay.\par \par \par \par

## 2021-02-03 ENCOUNTER — RESULT REVIEW (OUTPATIENT)
Age: 86
End: 2021-02-03

## 2021-02-03 ENCOUNTER — NON-APPOINTMENT (OUTPATIENT)
Age: 86
End: 2021-02-03

## 2021-02-03 ENCOUNTER — APPOINTMENT (OUTPATIENT)
Dept: INFUSION THERAPY | Facility: CLINIC | Age: 86
End: 2021-02-03

## 2021-02-03 ENCOUNTER — LABORATORY RESULT (OUTPATIENT)
Age: 86
End: 2021-02-03

## 2021-02-03 ENCOUNTER — APPOINTMENT (OUTPATIENT)
Dept: HEMATOLOGY ONCOLOGY | Facility: CLINIC | Age: 86
End: 2021-02-03

## 2021-02-03 VITALS
SYSTOLIC BLOOD PRESSURE: 168 MMHG | HEART RATE: 86 BPM | TEMPERATURE: 97.3 F | DIASTOLIC BLOOD PRESSURE: 63 MMHG | BODY MASS INDEX: 27.58 KG/M2 | WEIGHT: 148 LBS | RESPIRATION RATE: 16 BRPM | HEIGHT: 61.5 IN

## 2021-02-03 LAB
BASOPHILS # BLD AUTO: 0.01 K/UL — SIGNIFICANT CHANGE UP (ref 0–0.2)
BASOPHILS NFR BLD AUTO: 0.1 % — SIGNIFICANT CHANGE UP (ref 0–2)
EOSINOPHIL # BLD AUTO: 0.01 K/UL — SIGNIFICANT CHANGE UP (ref 0–0.5)
EOSINOPHIL NFR BLD AUTO: 0.1 % — SIGNIFICANT CHANGE UP (ref 0–6)
HCT VFR BLD CALC: 28.4 % — LOW (ref 34.5–45)
HGB BLD-MCNC: 9.6 G/DL — LOW (ref 11.5–15.5)
IMM GRANULOCYTES NFR BLD AUTO: 0.8 % — SIGNIFICANT CHANGE UP (ref 0–1.5)
LYMPHOCYTES # BLD AUTO: 0.88 K/UL — LOW (ref 1–3.3)
LYMPHOCYTES # BLD AUTO: 10 % — LOW (ref 13–44)
MCHC RBC-ENTMCNC: 31.2 PG — SIGNIFICANT CHANGE UP (ref 27–34)
MCHC RBC-ENTMCNC: 33.8 GM/DL — SIGNIFICANT CHANGE UP (ref 32–36)
MCV RBC AUTO: 92.2 FL — SIGNIFICANT CHANGE UP (ref 80–100)
MONOCYTES # BLD AUTO: 0.33 K/UL — SIGNIFICANT CHANGE UP (ref 0–0.9)
MONOCYTES NFR BLD AUTO: 3.8 % — SIGNIFICANT CHANGE UP (ref 2–14)
NEUTROPHILS # BLD AUTO: 7.5 K/UL — HIGH (ref 1.8–7.4)
NEUTROPHILS NFR BLD AUTO: 85.2 % — HIGH (ref 43–77)
NRBC # BLD: 0 /100 WBCS — SIGNIFICANT CHANGE UP (ref 0–0)
PLATELET # BLD AUTO: 407 K/UL — HIGH (ref 150–400)
RBC # BLD: 3.08 M/UL — LOW (ref 3.8–5.2)
RBC # FLD: 17.2 % — HIGH (ref 10.3–14.5)
WBC # BLD: 8.8 K/UL — SIGNIFICANT CHANGE UP (ref 3.8–10.5)
WBC # FLD AUTO: 8.8 K/UL — SIGNIFICANT CHANGE UP (ref 3.8–10.5)

## 2021-02-04 LAB
ERYTHROCYTE [SEDIMENTATION RATE] IN BLOOD: 65 MM/HR — HIGH (ref 0–20)
FERRITIN SERPL-MCNC: 195 NG/ML — HIGH (ref 15–150)
FOLATE SERPL-MCNC: >20 NG/ML — SIGNIFICANT CHANGE UP
IRON SATN MFR SERPL: 11 % — LOW (ref 14–50)
IRON SATN MFR SERPL: 29 UG/DL — LOW (ref 30–160)
TIBC SERPL-MCNC: 270 UG/DL — SIGNIFICANT CHANGE UP (ref 220–430)
UIBC SERPL-MCNC: 241 UG/DL — SIGNIFICANT CHANGE UP (ref 110–370)
VIT B12 SERPL-MCNC: 1083 PG/ML — SIGNIFICANT CHANGE UP (ref 232–1245)

## 2021-02-08 ENCOUNTER — NON-APPOINTMENT (OUTPATIENT)
Age: 86
End: 2021-02-08

## 2021-02-10 ENCOUNTER — APPOINTMENT (OUTPATIENT)
Dept: HEMATOLOGY ONCOLOGY | Facility: CLINIC | Age: 86
End: 2021-02-10

## 2021-02-10 ENCOUNTER — APPOINTMENT (OUTPATIENT)
Dept: INFUSION THERAPY | Facility: CLINIC | Age: 86
End: 2021-02-10

## 2021-02-10 ENCOUNTER — RESULT REVIEW (OUTPATIENT)
Age: 86
End: 2021-02-10

## 2021-02-10 VITALS
DIASTOLIC BLOOD PRESSURE: 61 MMHG | HEIGHT: 61.5 IN | HEART RATE: 84 BPM | SYSTOLIC BLOOD PRESSURE: 146 MMHG | WEIGHT: 146 LBS | TEMPERATURE: 97.3 F | BODY MASS INDEX: 27.21 KG/M2

## 2021-02-10 LAB
BASOPHILS # BLD AUTO: 0.01 K/UL — SIGNIFICANT CHANGE UP (ref 0–0.2)
BASOPHILS NFR BLD AUTO: 0.1 % — SIGNIFICANT CHANGE UP (ref 0–2)
EOSINOPHIL # BLD AUTO: 0.01 K/UL — SIGNIFICANT CHANGE UP (ref 0–0.5)
EOSINOPHIL NFR BLD AUTO: 0.1 % — SIGNIFICANT CHANGE UP (ref 0–6)
HCT VFR BLD CALC: 28.4 % — LOW (ref 34.5–45)
HGB BLD-MCNC: 9.6 G/DL — LOW (ref 11.5–15.5)
IMM GRANULOCYTES NFR BLD AUTO: 0.9 % — SIGNIFICANT CHANGE UP (ref 0–1.5)
LYMPHOCYTES # BLD AUTO: 0.67 K/UL — LOW (ref 1–3.3)
LYMPHOCYTES # BLD AUTO: 7.6 % — LOW (ref 13–44)
MCHC RBC-ENTMCNC: 31.1 PG — SIGNIFICANT CHANGE UP (ref 27–34)
MCHC RBC-ENTMCNC: 33.8 GM/DL — SIGNIFICANT CHANGE UP (ref 32–36)
MCV RBC AUTO: 91.9 FL — SIGNIFICANT CHANGE UP (ref 80–100)
MONOCYTES # BLD AUTO: 0.24 K/UL — SIGNIFICANT CHANGE UP (ref 0–0.9)
MONOCYTES NFR BLD AUTO: 2.7 % — SIGNIFICANT CHANGE UP (ref 2–14)
NEUTROPHILS # BLD AUTO: 7.84 K/UL — HIGH (ref 1.8–7.4)
NEUTROPHILS NFR BLD AUTO: 88.6 % — HIGH (ref 43–77)
NRBC # BLD: 0 /100 WBCS — SIGNIFICANT CHANGE UP (ref 0–0)
PLATELET # BLD AUTO: 239 K/UL — SIGNIFICANT CHANGE UP (ref 150–400)
RBC # BLD: 3.09 M/UL — LOW (ref 3.8–5.2)
RBC # FLD: 17 % — HIGH (ref 10.3–14.5)
WBC # BLD: 8.85 K/UL — SIGNIFICANT CHANGE UP (ref 3.8–10.5)
WBC # FLD AUTO: 8.85 K/UL — SIGNIFICANT CHANGE UP (ref 3.8–10.5)

## 2021-02-10 NOTE — HISTORY OF PRESENT ILLNESS
[Disease:__________________________] : Disease: [unfilled] [de-identified] : Feels well. Good energy. HAd some light flashes in her eyes- but states that she had them in the past.  She had no adverse reaction to  bortezomib  but did not like Dex 40 mg weekly dose ( 20 mg osmany x 2 days) - lowered to 20 mg weekly.\par \par  [de-identified] : Presented with low level monoclonal IgM on blood work in 2014 done for evaluation of chronic back pain ( evaluated- non- malignant causes). Monitored. No anemia, no adenopathy or splenomegaly. \par \par December 2020- routine follow up - new anemia, IgM up to > 5000 mg%. 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 \par Cytogenetics and lymphoma FISH panel negative. \par \par She was asymptomatic.\par Started bortezomib/ dex weekly with plan to add rituxan when IgM < 4000g%.\par \par S/p 2 cycles. \par

## 2021-02-11 ENCOUNTER — OUTPATIENT (OUTPATIENT)
Dept: OUTPATIENT SERVICES | Facility: HOSPITAL | Age: 86
LOS: 1 days | Discharge: HOME | End: 2021-02-11

## 2021-02-11 DIAGNOSIS — C88.0 WALDENSTROM MACROGLOBULINEMIA: ICD-10-CM

## 2021-02-17 ENCOUNTER — RESULT REVIEW (OUTPATIENT)
Age: 86
End: 2021-02-17

## 2021-02-17 ENCOUNTER — APPOINTMENT (OUTPATIENT)
Dept: INFUSION THERAPY | Facility: CLINIC | Age: 86
End: 2021-02-17

## 2021-02-17 VITALS
SYSTOLIC BLOOD PRESSURE: 152 MMHG | TEMPERATURE: 97.4 F | WEIGHT: 146 LBS | HEART RATE: 76 BPM | BODY MASS INDEX: 27.21 KG/M2 | RESPIRATION RATE: 16 BRPM | HEIGHT: 61.5 IN | DIASTOLIC BLOOD PRESSURE: 66 MMHG

## 2021-02-17 LAB
BASOPHILS # BLD AUTO: 0 K/UL — SIGNIFICANT CHANGE UP (ref 0–0.2)
BASOPHILS NFR BLD AUTO: 0 % — SIGNIFICANT CHANGE UP (ref 0–2)
EOSINOPHIL # BLD AUTO: 0 K/UL — SIGNIFICANT CHANGE UP (ref 0–0.5)
EOSINOPHIL NFR BLD AUTO: 0 % — SIGNIFICANT CHANGE UP (ref 0–6)
HCT VFR BLD CALC: 29.6 % — LOW (ref 34.5–45)
HGB BLD-MCNC: 10 G/DL — LOW (ref 11.5–15.5)
IMM GRANULOCYTES NFR BLD AUTO: 0.8 % — SIGNIFICANT CHANGE UP (ref 0–1.5)
LYMPHOCYTES # BLD AUTO: 0.68 K/UL — LOW (ref 1–3.3)
LYMPHOCYTES # BLD AUTO: 7.2 % — LOW (ref 13–44)
MCHC RBC-ENTMCNC: 30.9 PG — SIGNIFICANT CHANGE UP (ref 27–34)
MCHC RBC-ENTMCNC: 33.8 GM/DL — SIGNIFICANT CHANGE UP (ref 32–36)
MCV RBC AUTO: 91.4 FL — SIGNIFICANT CHANGE UP (ref 80–100)
MONOCYTES # BLD AUTO: 0.25 K/UL — SIGNIFICANT CHANGE UP (ref 0–0.9)
MONOCYTES NFR BLD AUTO: 2.6 % — SIGNIFICANT CHANGE UP (ref 2–14)
NEUTROPHILS # BLD AUTO: 8.44 K/UL — HIGH (ref 1.8–7.4)
NEUTROPHILS NFR BLD AUTO: 89.4 % — HIGH (ref 43–77)
NRBC # BLD: 0 /100 WBCS — SIGNIFICANT CHANGE UP (ref 0–0)
PLATELET # BLD AUTO: 222 K/UL — SIGNIFICANT CHANGE UP (ref 150–400)
RBC # BLD: 3.24 M/UL — LOW (ref 3.8–5.2)
RBC # FLD: 17.2 % — HIGH (ref 10.3–14.5)
WBC # BLD: 9.45 K/UL — SIGNIFICANT CHANGE UP (ref 3.8–10.5)
WBC # FLD AUTO: 9.45 K/UL — SIGNIFICANT CHANGE UP (ref 3.8–10.5)

## 2021-02-18 DIAGNOSIS — Z51.11 ENCOUNTER FOR ANTINEOPLASTIC CHEMOTHERAPY: ICD-10-CM

## 2021-02-24 ENCOUNTER — APPOINTMENT (OUTPATIENT)
Dept: HEMATOLOGY ONCOLOGY | Facility: CLINIC | Age: 86
End: 2021-02-24
Payer: COMMERCIAL

## 2021-02-24 ENCOUNTER — RESULT REVIEW (OUTPATIENT)
Age: 86
End: 2021-02-24

## 2021-02-24 ENCOUNTER — NON-APPOINTMENT (OUTPATIENT)
Age: 86
End: 2021-02-24

## 2021-02-24 LAB
BASOPHILS # BLD AUTO: 0.01 K/UL — SIGNIFICANT CHANGE UP (ref 0–0.2)
BASOPHILS NFR BLD AUTO: 0.1 % — SIGNIFICANT CHANGE UP (ref 0–2)
EOSINOPHIL # BLD AUTO: 0 K/UL — SIGNIFICANT CHANGE UP (ref 0–0.5)
EOSINOPHIL NFR BLD AUTO: 0 % — SIGNIFICANT CHANGE UP (ref 0–6)
HCT VFR BLD CALC: 28.3 % — LOW (ref 34.5–45)
HGB BLD-MCNC: 9.8 G/DL — LOW (ref 11.5–15.5)
IMM GRANULOCYTES NFR BLD AUTO: 1.1 % — SIGNIFICANT CHANGE UP (ref 0–1.5)
LYMPHOCYTES # BLD AUTO: 0.7 K/UL — LOW (ref 1–3.3)
LYMPHOCYTES # BLD AUTO: 7.5 % — LOW (ref 13–44)
MCHC RBC-ENTMCNC: 31.1 PG — SIGNIFICANT CHANGE UP (ref 27–34)
MCHC RBC-ENTMCNC: 34.6 GM/DL — SIGNIFICANT CHANGE UP (ref 32–36)
MCV RBC AUTO: 89.8 FL — SIGNIFICANT CHANGE UP (ref 80–100)
MONOCYTES # BLD AUTO: 0.22 K/UL — SIGNIFICANT CHANGE UP (ref 0–0.9)
MONOCYTES NFR BLD AUTO: 2.4 % — SIGNIFICANT CHANGE UP (ref 2–14)
NEUTROPHILS # BLD AUTO: 8.33 K/UL — HIGH (ref 1.8–7.4)
NEUTROPHILS NFR BLD AUTO: 88.9 % — HIGH (ref 43–77)
NRBC # BLD: 0 /100 WBCS — SIGNIFICANT CHANGE UP (ref 0–0)
PLATELET # BLD AUTO: 256 K/UL — SIGNIFICANT CHANGE UP (ref 150–400)
RBC # BLD: 3.15 M/UL — LOW (ref 3.8–5.2)
RBC # FLD: 17.2 % — HIGH (ref 10.3–14.5)
WBC # BLD: 9.36 K/UL — SIGNIFICANT CHANGE UP (ref 3.8–10.5)
WBC # FLD AUTO: 9.36 K/UL — SIGNIFICANT CHANGE UP (ref 3.8–10.5)

## 2021-02-24 PROCEDURE — 99499A: CUSTOM | Mod: NC

## 2021-02-25 ENCOUNTER — INPATIENT (INPATIENT)
Facility: HOSPITAL | Age: 86
LOS: 3 days | Discharge: HOME HEALTH | DRG: 643 | End: 2021-03-01
Attending: INTERNAL MEDICINE | Admitting: HOSPITALIST
Payer: MEDICARE

## 2021-02-25 ENCOUNTER — NON-APPOINTMENT (OUTPATIENT)
Age: 86
End: 2021-02-25

## 2021-02-25 VITALS — HEIGHT: 61.5 IN | WEIGHT: 149.91 LBS

## 2021-02-25 DIAGNOSIS — Z90.710 ACQUIRED ABSENCE OF BOTH CERVIX AND UTERUS: Chronic | ICD-10-CM

## 2021-02-25 DIAGNOSIS — E87.1 HYPO-OSMOLALITY AND HYPONATREMIA: ICD-10-CM

## 2021-02-25 LAB
% ALBUMIN: 40.8 % — SIGNIFICANT CHANGE UP
% ALPHA 1: 6 % — SIGNIFICANT CHANGE UP
% ALPHA 2: 13.8 % — SIGNIFICANT CHANGE UP
% BETA: 8 % — SIGNIFICANT CHANGE UP
% GAMMA: 31.4 % — SIGNIFICANT CHANGE UP
% M SPIKE: SIGNIFICANT CHANGE UP
ALBUMIN SERPL ELPH-MCNC: 2.7 G/DL — LOW (ref 3.3–5)
ALBUMIN SERPL ELPH-MCNC: 3.5 G/DL — LOW (ref 3.6–5.5)
ALBUMIN SERPL ELPH-MCNC: 3.7 G/DL — SIGNIFICANT CHANGE UP (ref 3.3–5)
ALBUMIN/GLOB SERPL ELPH: 0.7 RATIO — SIGNIFICANT CHANGE UP
ALP SERPL-CCNC: 76 U/L — SIGNIFICANT CHANGE UP (ref 40–120)
ALP SERPL-CCNC: 85 U/L — SIGNIFICANT CHANGE UP (ref 40–120)
ALPHA1 GLOB SERPL ELPH-MCNC: 0.5 G/DL — HIGH (ref 0.1–0.4)
ALPHA2 GLOB SERPL ELPH-MCNC: 1.2 G/DL — HIGH (ref 0.5–1)
ALT FLD-CCNC: 24 U/L — SIGNIFICANT CHANGE UP (ref 10–45)
ALT FLD-CCNC: 28 U/L — SIGNIFICANT CHANGE UP (ref 12–78)
ANION GAP SERPL CALC-SCNC: 14 MMOL/L — SIGNIFICANT CHANGE UP (ref 5–17)
ANION GAP SERPL CALC-SCNC: 8 MMOL/L — SIGNIFICANT CHANGE UP (ref 5–17)
ANION GAP SERPL CALC-SCNC: 9 MMOL/L — SIGNIFICANT CHANGE UP (ref 5–17)
APTT BLD: 30.1 SEC — SIGNIFICANT CHANGE UP (ref 27.5–35.5)
AST SERPL-CCNC: 13 U/L — LOW (ref 15–37)
AST SERPL-CCNC: 16 U/L — SIGNIFICANT CHANGE UP (ref 10–40)
B-GLOBULIN SERPL ELPH-MCNC: 0.7 G/DL — SIGNIFICANT CHANGE UP (ref 0.5–1)
BASOPHILS # BLD AUTO: 0.01 K/UL — SIGNIFICANT CHANGE UP (ref 0–0.2)
BASOPHILS NFR BLD AUTO: 0.1 % — SIGNIFICANT CHANGE UP (ref 0–2)
BILIRUB SERPL-MCNC: 0.5 MG/DL — SIGNIFICANT CHANGE UP (ref 0.2–1.2)
BILIRUB SERPL-MCNC: 0.6 MG/DL — SIGNIFICANT CHANGE UP (ref 0.2–1.2)
BUN SERPL-MCNC: 17 MG/DL — SIGNIFICANT CHANGE UP (ref 7–23)
BUN SERPL-MCNC: 18 MG/DL — SIGNIFICANT CHANGE UP (ref 7–23)
BUN SERPL-MCNC: 20 MG/DL — SIGNIFICANT CHANGE UP (ref 7–23)
CALCIUM SERPL-MCNC: 9.2 MG/DL — SIGNIFICANT CHANGE UP (ref 8.5–10.1)
CALCIUM SERPL-MCNC: 9.9 MG/DL — SIGNIFICANT CHANGE UP (ref 8.4–10.5)
CALCIUM SERPL-MCNC: 9.9 MG/DL — SIGNIFICANT CHANGE UP (ref 8.5–10.1)
CHLORIDE SERPL-SCNC: 82 MMOL/L — LOW (ref 96–108)
CHLORIDE SERPL-SCNC: 86 MMOL/L — LOW (ref 96–108)
CHLORIDE SERPL-SCNC: 89 MMOL/L — LOW (ref 96–108)
CO2 SERPL-SCNC: 23 MMOL/L — SIGNIFICANT CHANGE UP (ref 22–31)
CO2 SERPL-SCNC: 25 MMOL/L — SIGNIFICANT CHANGE UP (ref 22–31)
CO2 SERPL-SCNC: 26 MMOL/L — SIGNIFICANT CHANGE UP (ref 22–31)
CREAT SERPL-MCNC: 0.58 MG/DL — SIGNIFICANT CHANGE UP (ref 0.5–1.3)
CREAT SERPL-MCNC: 0.71 MG/DL — SIGNIFICANT CHANGE UP (ref 0.5–1.3)
CREAT SERPL-MCNC: 0.84 MG/DL — SIGNIFICANT CHANGE UP (ref 0.5–1.3)
EOSINOPHIL # BLD AUTO: 0 K/UL — SIGNIFICANT CHANGE UP (ref 0–0.5)
EOSINOPHIL NFR BLD AUTO: 0 % — SIGNIFICANT CHANGE UP (ref 0–6)
GAMMA GLOBULIN: 2.7 G/DL — HIGH (ref 0.6–1.6)
GLUCOSE SERPL-MCNC: 163 MG/DL — HIGH (ref 70–99)
GLUCOSE SERPL-MCNC: 163 MG/DL — HIGH (ref 70–99)
GLUCOSE SERPL-MCNC: 183 MG/DL — HIGH (ref 70–99)
HCT VFR BLD CALC: 28.3 % — LOW (ref 34.5–45)
HGB BLD-MCNC: 9.7 G/DL — LOW (ref 11.5–15.5)
IGA FLD-MCNC: 12 MG/DL — LOW (ref 84–499)
IGG FLD-MCNC: 356 MG/DL — LOW (ref 610–1660)
IGM SERPL-MCNC: 4298 MG/DL — HIGH (ref 35–242)
IMM GRANULOCYTES NFR BLD AUTO: 1.6 % — HIGH (ref 0–1.5)
INR BLD: 1.23 RATIO — HIGH (ref 0.88–1.16)
INTERPRETATION SERPL IFE-IMP: SIGNIFICANT CHANGE UP
KAPPA LC SER QL IFE: 11.15 MG/DL — HIGH (ref 0.33–1.94)
KAPPA/LAMBDA FREE LIGHT CHAIN RATIO, SERUM: 65.59 RATIO — HIGH (ref 0.26–1.65)
LAMBDA LC SER QL IFE: 0.17 MG/DL — LOW (ref 0.57–2.63)
LYMPHOCYTES # BLD AUTO: 1.11 K/UL — SIGNIFICANT CHANGE UP (ref 1–3.3)
LYMPHOCYTES # BLD AUTO: 10.8 % — LOW (ref 13–44)
M-SPIKE: SIGNIFICANT CHANGE UP (ref 0–0)
MAGNESIUM SERPL-MCNC: 2 MG/DL — SIGNIFICANT CHANGE UP (ref 1.6–2.6)
MCHC RBC-ENTMCNC: 30.6 PG — SIGNIFICANT CHANGE UP (ref 27–34)
MCHC RBC-ENTMCNC: 34.3 GM/DL — SIGNIFICANT CHANGE UP (ref 32–36)
MCV RBC AUTO: 89.3 FL — SIGNIFICANT CHANGE UP (ref 80–100)
MONOCYTES # BLD AUTO: 0.97 K/UL — HIGH (ref 0–0.9)
MONOCYTES NFR BLD AUTO: 9.5 % — SIGNIFICANT CHANGE UP (ref 2–14)
NEUTROPHILS # BLD AUTO: 8 K/UL — HIGH (ref 1.8–7.4)
NEUTROPHILS NFR BLD AUTO: 78 % — HIGH (ref 43–77)
PLATELET # BLD AUTO: 329 K/UL — SIGNIFICANT CHANGE UP (ref 150–400)
POTASSIUM SERPL-MCNC: 3.8 MMOL/L — SIGNIFICANT CHANGE UP (ref 3.5–5.3)
POTASSIUM SERPL-MCNC: 4.2 MMOL/L — SIGNIFICANT CHANGE UP (ref 3.5–5.3)
POTASSIUM SERPL-MCNC: 4.6 MMOL/L — SIGNIFICANT CHANGE UP (ref 3.5–5.3)
POTASSIUM SERPL-SCNC: 3.8 MMOL/L — SIGNIFICANT CHANGE UP (ref 3.5–5.3)
POTASSIUM SERPL-SCNC: 4.2 MMOL/L — SIGNIFICANT CHANGE UP (ref 3.5–5.3)
POTASSIUM SERPL-SCNC: 4.6 MMOL/L — SIGNIFICANT CHANGE UP (ref 3.5–5.3)
PROT PATTERN SERPL ELPH-IMP: SIGNIFICANT CHANGE UP
PROT SERPL-MCNC: 8.5 G/DL — HIGH (ref 6–8.3)
PROT SERPL-MCNC: 8.9 GM/DL — HIGH (ref 6–8.3)
PROTHROM AB SERPL-ACNC: 14.3 SEC — HIGH (ref 10.6–13.6)
RBC # BLD: 3.17 M/UL — LOW (ref 3.8–5.2)
RBC # FLD: 16.8 % — HIGH (ref 10.3–14.5)
SARS-COV-2 RNA SPEC QL NAA+PROBE: SIGNIFICANT CHANGE UP
SODIUM SERPL-SCNC: 119 MMOL/L — CRITICAL LOW (ref 135–145)
SODIUM SERPL-SCNC: 120 MMOL/L — CRITICAL LOW (ref 135–145)
SODIUM SERPL-SCNC: 123 MMOL/L — LOW (ref 135–145)
WBC # BLD: 10.25 K/UL — SIGNIFICANT CHANGE UP (ref 3.8–10.5)
WBC # FLD AUTO: 10.25 K/UL — SIGNIFICANT CHANGE UP (ref 3.8–10.5)

## 2021-02-25 PROCEDURE — 85027 COMPLETE CBC AUTOMATED: CPT

## 2021-02-25 PROCEDURE — 83735 ASSAY OF MAGNESIUM: CPT

## 2021-02-25 PROCEDURE — 93005 ELECTROCARDIOGRAM TRACING: CPT

## 2021-02-25 PROCEDURE — 83935 ASSAY OF URINE OSMOLALITY: CPT

## 2021-02-25 PROCEDURE — 82530 CORTISOL FREE: CPT

## 2021-02-25 PROCEDURE — 84300 ASSAY OF URINE SODIUM: CPT

## 2021-02-25 PROCEDURE — 80069 RENAL FUNCTION PANEL: CPT

## 2021-02-25 PROCEDURE — 80048 BASIC METABOLIC PNL TOTAL CA: CPT

## 2021-02-25 PROCEDURE — 93010 ELECTROCARDIOGRAM REPORT: CPT

## 2021-02-25 PROCEDURE — 84520 ASSAY OF UREA NITROGEN: CPT

## 2021-02-25 PROCEDURE — 84443 ASSAY THYROID STIM HORMONE: CPT

## 2021-02-25 PROCEDURE — 97161 PT EVAL LOW COMPLEX 20 MIN: CPT | Mod: GP

## 2021-02-25 PROCEDURE — 97116 GAIT TRAINING THERAPY: CPT | Mod: GP

## 2021-02-25 PROCEDURE — 70450 CT HEAD/BRAIN W/O DYE: CPT | Mod: 26

## 2021-02-25 PROCEDURE — 71045 X-RAY EXAM CHEST 1 VIEW: CPT | Mod: 26

## 2021-02-25 PROCEDURE — 99223 1ST HOSP IP/OBS HIGH 75: CPT | Mod: GC

## 2021-02-25 PROCEDURE — 80053 COMPREHEN METABOLIC PANEL: CPT

## 2021-02-25 PROCEDURE — 36415 COLL VENOUS BLD VENIPUNCTURE: CPT

## 2021-02-25 PROCEDURE — 83930 ASSAY OF BLOOD OSMOLALITY: CPT

## 2021-02-25 PROCEDURE — 99497 ADVNCD CARE PLAN 30 MIN: CPT | Mod: 25

## 2021-02-25 PROCEDURE — 86769 SARS-COV-2 COVID-19 ANTIBODY: CPT

## 2021-02-25 RX ORDER — NEBIVOLOL HYDROCHLORIDE 5 MG/1
5 TABLET ORAL AT BEDTIME
Refills: 0 | Status: DISCONTINUED | OUTPATIENT
Start: 2021-02-25 | End: 2021-02-26

## 2021-02-25 RX ORDER — LOSARTAN POTASSIUM 100 MG/1
100 TABLET, FILM COATED ORAL AT BEDTIME
Refills: 0 | Status: DISCONTINUED | OUTPATIENT
Start: 2021-02-25 | End: 2021-02-26

## 2021-02-25 RX ORDER — VANCOMYCIN HCL 1 G
1000 VIAL (EA) INTRAVENOUS ONCE
Refills: 0 | Status: COMPLETED | OUTPATIENT
Start: 2021-02-25 | End: 2021-02-25

## 2021-02-25 RX ORDER — DEXAMETHASONE 0.5 MG/5ML
1 ELIXIR ORAL
Qty: 0 | Refills: 0 | DISCHARGE

## 2021-02-25 RX ORDER — DILTIAZEM HCL 120 MG
240 CAPSULE, EXT RELEASE 24 HR ORAL DAILY
Refills: 0 | Status: DISCONTINUED | OUTPATIENT
Start: 2021-02-25 | End: 2021-02-26

## 2021-02-25 RX ORDER — ATORVASTATIN CALCIUM 80 MG/1
20 TABLET, FILM COATED ORAL AT BEDTIME
Refills: 0 | Status: DISCONTINUED | OUTPATIENT
Start: 2021-02-25 | End: 2021-03-01

## 2021-02-25 RX ORDER — LEVOTHYROXINE SODIUM 125 MCG
50 TABLET ORAL DAILY
Refills: 0 | Status: DISCONTINUED | OUTPATIENT
Start: 2021-02-25 | End: 2021-03-01

## 2021-02-25 RX ORDER — HYDROCHLOROTHIAZIDE 25 MG
12.5 TABLET ORAL DAILY
Refills: 0 | Status: DISCONTINUED | OUTPATIENT
Start: 2021-02-25 | End: 2021-02-26

## 2021-02-25 RX ORDER — CEFEPIME 1 G/1
2000 INJECTION, POWDER, FOR SOLUTION INTRAMUSCULAR; INTRAVENOUS ONCE
Refills: 0 | Status: COMPLETED | OUTPATIENT
Start: 2021-02-25 | End: 2021-02-25

## 2021-02-25 RX ORDER — SODIUM CHLORIDE 9 MG/ML
1000 INJECTION INTRAMUSCULAR; INTRAVENOUS; SUBCUTANEOUS
Refills: 0 | Status: DISCONTINUED | OUTPATIENT
Start: 2021-02-25 | End: 2021-02-26

## 2021-02-25 RX ORDER — ACETAMINOPHEN 500 MG
650 TABLET ORAL EVERY 6 HOURS
Refills: 0 | Status: DISCONTINUED | OUTPATIENT
Start: 2021-02-25 | End: 2021-03-01

## 2021-02-25 RX ORDER — ACYCLOVIR SODIUM 500 MG
400 VIAL (EA) INTRAVENOUS
Refills: 0 | Status: DISCONTINUED | OUTPATIENT
Start: 2021-02-25 | End: 2021-03-01

## 2021-02-25 RX ADMIN — SODIUM CHLORIDE 125 MILLILITER(S): 9 INJECTION INTRAMUSCULAR; INTRAVENOUS; SUBCUTANEOUS at 13:42

## 2021-02-25 RX ADMIN — CEFEPIME 100 MILLIGRAM(S): 1 INJECTION, POWDER, FOR SOLUTION INTRAMUSCULAR; INTRAVENOUS at 15:20

## 2021-02-25 RX ADMIN — Medication 250 MILLIGRAM(S): at 16:18

## 2021-02-25 RX ADMIN — CEFEPIME 2000 MILLIGRAM(S): 1 INJECTION, POWDER, FOR SOLUTION INTRAMUSCULAR; INTRAVENOUS at 16:09

## 2021-02-25 NOTE — H&P ADULT - NSICDXPASTMEDICALHX_GEN_ALL_CORE_FT
PAST MEDICAL HISTORY:  CAD (coronary artery disease)     HLD (hyperlipidemia)     Hypertension     Waldenstrom's disease      PAST MEDICAL HISTORY:  CAD (coronary artery disease)     HLD (hyperlipidemia)     Hypertension     Hypothyroidism     Waldenstrom's disease

## 2021-02-25 NOTE — ED ADULT TRIAGE NOTE - CHIEF COMPLAINT QUOTE
sent in by Dr. Locke for sodium of 119. as per son pt has been more confused and weak. sent in by Dr. Locke for sodium of 119. as per son pt has been more confused and weak. Toy Byers [phone]

## 2021-02-25 NOTE — H&P ADULT - ASSESSMENT
85F with Waldenstrom macroglobulinemia, HTN, HLD, CAD, vision loss, and [place] presents for hyponatremia. Asymptomatic save for fatigue and possible mild confusion.    #Metabolic encephalopathy secondary to hyponatremia  -Acute on chronic hyponatremia. There is likely a dietary component as patient intentionally reduced salt intake. It is possible chemotherapy may also be inducing SIADH  -    #    88% on RA  142/60, did not take evening meds  # 85F with Waldenstrom macroglobulinemia, HTN, HLD, CAD, hypothyroidism, vision loss, and [place] presents for hyponatremia. Asymptomatic save for fatigue and possible mild confusion.    #Metabolic encephalopathy secondary to hyponatremia  -Mild confusion likely secondary to hyponatremia. CT head demonstrated no acute findings  -Acute on chronic hyponatremia. There is likely a dietary component as patient intentionally reduced salt intake. May also have SIADH secondary to chemotherapy and/or pneumonia  -Goal of increase 4-6 mmol Na per 24hr period. Already corrected from 119 to 123. Will  reduce NS rate from 125 to 100cc/hr  -Liberate diet to include normal salt intake    #Acute hypoxic respiratory failure. Secondary to pneumonia?  -Requiring 2L nasal cannula; desaturated to 88% on room air  -Unclear if patient has true pneumonia. Scattered infiltrates on CXR reported to be chronic. Patient has no fever, chills, cough, or dyspnea, though does have hypoxia and hyponatremia. Will monitor off of antibiotics for now    #Chronic anemia  -At b/l. Secondary to lymphoma    #Protein calorie malnutrition  -[place] diet  -Nutrition consult    Continue home medications for chronic conditions  #Waldenstrom macroglobulinemia - Acyclovir 400mg BID. Due for chemotherapy on Wednesday; anticipate d/c prior to next session  #HTN - Diltiazem XR 240mg daily, HCTZ 12.5mg daily, losartan 100mg qhs, nebivolol 5mg qhs  #HLD/CAD - Atorvastatin 20mg qhs  #Hypothyroidism - Synthroid 50mcg daily    #Advance care planning  -HCP: Mary Kay Mckeon (daughter) [phone] (per verbal patient request; patient has not completed a HCP form)  -Patient prefers FULL CODE, including resuscitation and intubation as indicated    #DVT ppx  -Lovenox 40mg subcu daily  IMPROVE VTE Individual Risk Assessment  RISK                                                                Points  [  ] Previous VTE                                                  3  [  ] Thrombophilia                                               2  [  ] Lower limb paralysis                                      2        (unable to hold up >15 seconds)    [ x ] Current Cancer                                              2         (within 6 months)  [  ] Immobilization > 24 hrs                                1  [  ] ICU/CCU stay > 24 hours                              1  [ x ] Age > 60                                                      1    IMPROVE VTE Score ___3____    IMPROVE Score 0-1: Low Risk, No VTE prophylaxis required for most patients, encourage ambulation.   IMPROVE Score 2-3: At risk, pharmacologic VTE prophylaxis is indicated for most patients (in the absence of a contraindication)  IMPROVE Score > or = 4: High Risk, pharmacologic VTE prophylaxis is indicated for most patients (in the absence of a contraindication) 85F with Waldenstrom macroglobulinemia, HTN, HLD, CAD, hypothyroidism, vision loss, and [place] presents for hyponatremia. Asymptomatic save for fatigue and possible mild confusion.    #Metabolic encephalopathy secondary to hyponatremia  -Mild confusion likely secondary to hyponatremia. CT head demonstrated no acute findings  -Acute on chronic hyponatremia. There is likely a dietary component as patient intentionally reduced salt intake. May also have SIADH secondary to chemotherapy and/or pneumonia  -Goal of increase 4-6 mmol Na per 24hr period. Already corrected from 119 to 123. Will  reduce NS rate from 125 to 80cc/hr  -Liberate diet to include normal salt intake    #Acute hypoxic respiratory failure. Secondary to pneumonia?  -Requiring 2L nasal cannula; desaturated to 88% on room air  -Unclear if patient has true pneumonia. Scattered infiltrates on CXR reported to be chronic. Patient has no fever, chills, cough, or dyspnea, though does have hypoxia and hyponatremia. Will continue vanc/cefepime and monitor    #Chronic anemia  -At b/l. Secondary to lymphoma    #Protein calorie malnutrition  -[place] diet  -Nutrition consult    Continue home medications for chronic conditions  #Waldenstrom macroglobulinemia - Acyclovir 400mg BID. Due for chemotherapy on Wednesday; anticipate d/c prior to next session  #HTN - Diltiazem XR 240mg daily, HCTZ 12.5mg daily, losartan 100mg qhs, nebivolol 5mg qhs  #HLD/CAD - Atorvastatin 20mg qhs  #Hypothyroidism - Synthroid 50mcg daily    #Advance care planning  -HCP: Mary Kay Mckeon (daughter) [phone] (per verbal patient request; patient has not completed a HCP form)  -Patient prefers FULL CODE, including resuscitation and intubation as indicated    #DVT ppx  -Lovenox 40mg subcu daily  IMPROVE VTE Individual Risk Assessment  RISK                                                                Points  [  ] Previous VTE                                                  3  [  ] Thrombophilia                                               2  [  ] Lower limb paralysis                                      2        (unable to hold up >15 seconds)    [ x ] Current Cancer                                              2         (within 6 months)  [  ] Immobilization > 24 hrs                                1  [  ] ICU/CCU stay > 24 hours                              1  [ x ] Age > 60                                                      1    IMPROVE VTE Score ___3____    IMPROVE Score 0-1: Low Risk, No VTE prophylaxis required for most patients, encourage ambulation.   IMPROVE Score 2-3: At risk, pharmacologic VTE prophylaxis is indicated for most patients (in the absence of a contraindication)  IMPROVE Score > or = 4: High Risk, pharmacologic VTE prophylaxis is indicated for most patients (in the absence of a contraindication) 85F with Waldenstrom macroglobulinemia, HTN, HLD, CAD, hypothyroidism, vision loss, and [place] presents for hyponatremia. Asymptomatic save for fatigue and possible mild confusion.    #Metabolic encephalopathy secondary to hyponatremia  -Mild confusion likely secondary to hyponatremia. CT head demonstrated no acute findings  -Acute on chronic hyponatremia. There is likely a dietary component as patient intentionally reduced salt intake. May also have SIADH secondary to chemotherapy and/or pneumonia  -Goal of increase 4-6 mmol Na per 24hr period. Already corrected from 119 to 123. Will  reduce NS rate from 125 to 80cc/hr  -Liberate diet to include normal salt intake    #Acute hypoxia, Secondary to pneumonia  -Requiring 2L nasal cannula; desaturated to 88% on room air  -Unclear if patient has true pneumonia. Scattered infiltrates on CXR reported to be chronic. Patient has no fever, chills, cough, or dyspnea, though does have hypoxia and hyponatremia. -Will continue vanc/cefepime and monitor, f/u ID consult    #Chronic anemia  -At b/l. Secondary to lymphoma    #Protein calorie malnutrition  -[place] diet  -Nutrition consult    Continue home medications for chronic conditions  #Waldenstrom macroglobulinemia - Acyclovir 400mg BID. Due for chemotherapy on Wednesday; anticipate d/c prior to next session  #HTN - Diltiazem XR 240mg daily, HCTZ 12.5mg daily, losartan 100mg qhs, nebivolol 5mg qhs  #HLD/CAD - Atorvastatin 20mg qhs  #Hypothyroidism - Synthroid 50mcg daily    #Advance care planning  -HCP: Mary Kay Mckeon (daughter) [phone] (per verbal patient request; patient has not completed a HCP form)  -Patient prefers FULL CODE, including resuscitation and intubation as indicated    #DVT ppx  -Lovenox 40mg subcu daily  IMPROVE VTE Individual Risk Assessment  RISK                                                                Points  [  ] Previous VTE                                                  3  [  ] Thrombophilia                                               2  [  ] Lower limb paralysis                                      2        (unable to hold up >15 seconds)    [ x ] Current Cancer                                              2         (within 6 months)  [  ] Immobilization > 24 hrs                                1  [  ] ICU/CCU stay > 24 hours                              1  [ x ] Age > 60                                                      1    IMPROVE VTE Score ___3____    IMPROVE Score 0-1: Low Risk, No VTE prophylaxis required for most patients, encourage ambulation.   IMPROVE Score 2-3: At risk, pharmacologic VTE prophylaxis is indicated for most patients (in the absence of a contraindication)  IMPROVE Score > or = 4: High Risk, pharmacologic VTE prophylaxis is indicated for most patients (in the absence of a contraindication)

## 2021-02-25 NOTE — ED ADULT NURSE NOTE - OBJECTIVE STATEMENT
Patient presenting to ED with hyponatremia, sodium 119. Referred by PCP. Patient denies HA, dizziness, nausea. Patient A&OX3, follows commands and engages in conversation appropriately. No signs of fatigue or lethargy. Patient takes diuretic Hydrochlorothiazide once a day in AM.

## 2021-02-25 NOTE — H&P ADULT - NSHPLABSRESULTS_GEN_ALL_CORE
LABS:  cret                        9.7    10.25 )-----------( 329      ( 25 Feb 2021 13:10 )             28.3     02-25    123<L>  |  89<L>  |  18  ----------------------------<  163<H>  3.8   |  25  |  0.58    Ca    9.2      25 Feb 2021 21:59  Mg     2.0     02-25    TPro  8.9<H>  /  Alb  2.7<L>  /  TBili  0.5  /  DBili  x   /  AST  13<L>  /  ALT  28  /  AlkPhos  76  02-25    PT/INR - ( 25 Feb 2021 13:10 )   PT: 14.3 sec;   INR: 1.23 ratio         PTT - ( 25 Feb 2021 13:10 )  PTT:30.1 sec    < from: CT Head No Cont (02.25.21 @ 14:33) >    IMPRESSION: No acute intracranial hemorrhage or mass effect.< from: Xray Chest 1 View- PORTABLE-Routine (Xray Chest 1 View- PORTABLE-Routine .) (02.25.21 @ 14:00) >    Impression:unchanged scattered focal infiltrates in the BILATERAL mid lungs and upper and lower lobes.    < end of copied text > LABS:  cret                        9.7    10.25 )-----------( 329      ( 25 Feb 2021 13:10 )             28.3     02-25    123<L>  |  89<L>  |  18  ----------------------------<  163<H>  3.8   |  25  |  0.58    Ca    9.2      25 Feb 2021 21:59  Mg     2.0     02-25    TPro  8.9<H>  /  Alb  2.7<L>  /  TBili  0.5  /  DBili  x   /  AST  13<L>  /  ALT  28  /  AlkPhos  76  02-25    PT/INR - ( 25 Feb 2021 13:10 )   PT: 14.3 sec;   INR: 1.23 ratio         PTT - ( 25 Feb 2021 13:10 )  PTT:30.1 sec    < from: CT Head No Cont (02.25.21 @ 14:33) >    IMPRESSION: No acute intracranial hemorrhage or mass effect.    < from: Xray Chest 1 View- PORTABLE-Routine (Xray Chest 1 View- PORTABLE-Routine .) (02.25.21 @ 14:00) >    Impression: unchanged scattered focal infiltrates in the BILATERAL mid lungs and upper and lower lobes.    < end of copied text >

## 2021-02-25 NOTE — ED PROVIDER NOTE - OBJECTIVE STATEMENT
85F  hx HTN, waldenstrom's, NHL here with hyponatremia.  History provided by son and PMD ([place]) -- has been increasingly confused over the past week.  Sent for eval by Dr. Locke w/Rh=[id number]. Patient without complaints.

## 2021-02-25 NOTE — H&P ADULT - NSHPPHYSICALEXAM_GEN_ALL_CORE
Vital Signs Last 24 Hrs  T(C): 36.8 (25 Feb 2021 14:50), Max: 37 (25 Feb 2021 13:00)  T(F): 98.2 (25 Feb 2021 14:50), Max: 98.6 (25 Feb 2021 13:00)  HR: 71 (25 Feb 2021 17:37) (71 - 77)  BP: 137/61 (25 Feb 2021 17:37) (137/61 - 149/60)  BP(mean): 92 (25 Feb 2021 14:50) (82 - 92)  RR: 16 (25 Feb 2021 14:50) (16 - 18)  SpO2: 96% (25 Feb 2021 17:37) (91% - 96%)    Physical Exam  Gen: WD, WN, NAD  HENT: EOMI, moist mucous membranes  Neck: Trachea midline  CV: RRR, no r/g/m  Pulm: CTAB, no crackles, wheezes, or rhonchi  Abd: Soft, NT, ND  Ext: No LE edema  Back: Kyphosis  Neuro: A+Ox3, aware of reason for admission and medical hx, no gross memory or attention deficits  Psych: Normal affective range

## 2021-02-25 NOTE — H&P ADULT - NSHPREVIEWOFSYSTEMS_GEN_ALL_CORE
Gen: Fatigue. No fever, chills, weakness  ENT: No visual changes or throat pain  Neck: No pain or stiffness  Respiratory: No cough or wheezing  Cardiovascular: No chest pain or palpitations  Gastrointestinal: No abdominal pain, nausea, vomiting, constipation, or diarrhea  Hematologic: No easy bleeding or bruising  Neurologic: No numbness or focal weakness  Psych: Insomnia. No depression  Skin: No rash or itching

## 2021-02-25 NOTE — H&P ADULT - HISTORY OF PRESENT ILLNESS
85F with Waldenstrom macroglobulinemia (NHL), HTN, HLD, CAD, and [place] (with hearing aid) sent by Dr. Locke for hyponatremia. Patient reports some fatigue, but denies other symptoms. Notes she has tried to minimize salt in her diet at the recommendation of her PCP. Denies chest pain, cough, dyspnea, fever, chills.   Per son, patient has been confused about her medications and how to take them, but he feels that this is less true confusion and more her being overwhelmed by her new treatment regimen for Waldenstrom macroglobulinemia. He also reports she has had some insomnia since starting this regimen, which includes steroids. He believes she is taking sleeping pills occasionally, but does not know which ones. Also states patient recently had constipation alleviated by miralax. Patient lives with her , and is independent in ADLs and IADLs.    ED: NS 125cc/hr, vanc, cefepime 85F with Waldenstrom macroglobulinemia (NHL), HTN, HLD, CAD, vision loss, and [place] (with hearing aid) sent by Dr. Locke for hyponatremia. Patient reports some fatigue, but denies other symptoms. Notes she has tried to minimize salt in her diet at the recommendation of her PCP. Denies chest pain, cough, dyspnea, fever, chills.   Per son, patient has been confused about her medications and how to take them, but he feels that this is less true confusion and more her being overwhelmed by her new treatment regimen for Waldenstrom macroglobulinemia. He also reports she has had some insomnia since starting this regimen, which includes steroids. He believes she is taking sleeping pills occasionally, but does not know which ones. Also states patient recently had constipation alleviated by miralax. Patient lives with her , and is independent in ADLs and IADLs.    ED: NS 125cc/hr, vanc, cefepime 85F with Waldenstrom macroglobulinemia (NHL), HTN, HLD, CAD, hypothyroidism, vision loss, and [place] (with hearing aid) sent by Dr. Locke for hyponatremia. Patient reports some fatigue, but denies other symptoms. Notes she has tried to minimize salt in her diet at the recommendation of her PCP. Denies chest pain, cough, dyspnea, fever, chills.   Per son, patient has been confused about her medications and how to take them, but he feels that this is less true confusion and more her being overwhelmed by her new treatment regimen for Waldenstrom macroglobulinemia. He also reports she has had some insomnia since starting this regimen, which includes steroids. He believes she is taking sleeping pills occasionally, but does not know which ones. Also states patient recently had constipation alleviated by miralax. Patient lives with her , and is independent in ADLs and IADLs.    ED: NS 125cc/hr, vanc, cefepime 85F with Waldenstrom macroglobulinemia (NHL), HTN, HLD, CAD, hypothyroidism, vision loss, and [place] (with hearing aid) sent by Dr. Locke for hyponatremia. Patient reports some fatigue, but denies other symptoms. Notes she has tried to minimize salt in her diet at the recommendation of her PCP. Denies chest pain, cough, dyspnea, fever, chills.   Per son Toy Byers, patient has been confused about her medications and how to take them, but he feels that this is less true confusion and more her being overwhelmed by her new treatment regimen for Waldenstrom macroglobulinemia. He also reports she has had some insomnia since starting this regimen, which includes steroids. He believes she is taking sleeping pills occasionally, but does not know which ones. Also states patient recently had constipation alleviated by miralax. Patient lives with her , and is independent in ADLs and IADLs.    ED: NS 125cc/hr, vanc, cefepime

## 2021-02-25 NOTE — ED ADULT NURSE REASSESSMENT NOTE - NS ED NURSE REASSESS COMMENT FT1
pt plan of care discussed with MD Cool. urine osmolality, repeat BMP ordered and sent to lab. as per MD no additional IVF at this time. as per MD pt is next to be seen by hospitalist. pt ambulated with steady gait to bathroom. remains A&O x4. resting in bed with comfortable appearance. side rails up.
care assumed from previous RN. pt is A&O x4. normal saline infusing @125 mL/hr. ambulated to bathroom with safe and steady gait, stand by assist. awaiting evaluation by hospitalist, inpatient bed. resting in bed with comfortable appearance at this time, safety maintained, side rails up.

## 2021-02-25 NOTE — ED ADULT NURSE NOTE - CHIEF COMPLAINT QUOTE
sent in by Dr. Locke for sodium of 119. as per son pt has been more confused and weak. Toy Byers [phone]

## 2021-02-26 DIAGNOSIS — C88.0 WALDENSTROM MACROGLOBULINEMIA: ICD-10-CM

## 2021-02-26 DIAGNOSIS — E87.1 HYPO-OSMOLALITY AND HYPONATREMIA: ICD-10-CM

## 2021-02-26 LAB
ANION GAP SERPL CALC-SCNC: 6 MMOL/L — SIGNIFICANT CHANGE UP (ref 5–17)
ANION GAP SERPL CALC-SCNC: 6 MMOL/L — SIGNIFICANT CHANGE UP (ref 5–17)
BUN SERPL-MCNC: 17 MG/DL — SIGNIFICANT CHANGE UP (ref 7–23)
BUN SERPL-MCNC: 23 MG/DL — SIGNIFICANT CHANGE UP (ref 7–23)
CALCIUM SERPL-MCNC: 8.7 MG/DL — SIGNIFICANT CHANGE UP (ref 8.5–10.1)
CALCIUM SERPL-MCNC: 9 MG/DL — SIGNIFICANT CHANGE UP (ref 8.5–10.1)
CHLORIDE SERPL-SCNC: 88 MMOL/L — LOW (ref 96–108)
CHLORIDE SERPL-SCNC: 90 MMOL/L — LOW (ref 96–108)
CO2 SERPL-SCNC: 26 MMOL/L — SIGNIFICANT CHANGE UP (ref 22–31)
CO2 SERPL-SCNC: 27 MMOL/L — SIGNIFICANT CHANGE UP (ref 22–31)
CREAT SERPL-MCNC: 0.56 MG/DL — SIGNIFICANT CHANGE UP (ref 0.5–1.3)
CREAT SERPL-MCNC: 0.66 MG/DL — SIGNIFICANT CHANGE UP (ref 0.5–1.3)
GLUCOSE SERPL-MCNC: 113 MG/DL — HIGH (ref 70–99)
GLUCOSE SERPL-MCNC: 144 MG/DL — HIGH (ref 70–99)
OSMOLALITY SERPL: 259 MOSMOL/KG — LOW (ref 280–301)
OSMOLALITY UR: 632 MOSM/KG — SIGNIFICANT CHANGE UP (ref 50–1200)
POTASSIUM SERPL-MCNC: 3.9 MMOL/L — SIGNIFICANT CHANGE UP (ref 3.5–5.3)
POTASSIUM SERPL-MCNC: 3.9 MMOL/L — SIGNIFICANT CHANGE UP (ref 3.5–5.3)
POTASSIUM SERPL-SCNC: 3.9 MMOL/L — SIGNIFICANT CHANGE UP (ref 3.5–5.3)
POTASSIUM SERPL-SCNC: 3.9 MMOL/L — SIGNIFICANT CHANGE UP (ref 3.5–5.3)
SODIUM SERPL-SCNC: 120 MMOL/L — CRITICAL LOW (ref 135–145)
SODIUM SERPL-SCNC: 123 MMOL/L — LOW (ref 135–145)

## 2021-02-26 PROCEDURE — 99233 SBSQ HOSP IP/OBS HIGH 50: CPT

## 2021-02-26 PROCEDURE — 99223 1ST HOSP IP/OBS HIGH 75: CPT

## 2021-02-26 PROCEDURE — 93010 ELECTROCARDIOGRAM REPORT: CPT

## 2021-02-26 RX ORDER — VANCOMYCIN HCL 1 G
750 VIAL (EA) INTRAVENOUS EVERY 12 HOURS
Refills: 0 | Status: DISCONTINUED | OUTPATIENT
Start: 2021-02-26 | End: 2021-02-26

## 2021-02-26 RX ORDER — ALBUTEROL 90 UG/1
1 AEROSOL, METERED ORAL EVERY 4 HOURS
Refills: 0 | Status: DISCONTINUED | OUTPATIENT
Start: 2021-02-26 | End: 2021-03-01

## 2021-02-26 RX ORDER — ENOXAPARIN SODIUM 100 MG/ML
40 INJECTION SUBCUTANEOUS DAILY
Refills: 0 | Status: DISCONTINUED | OUTPATIENT
Start: 2021-02-26 | End: 2021-03-01

## 2021-02-26 RX ORDER — SODIUM CHLORIDE 5 G/100ML
500 INJECTION, SOLUTION INTRAVENOUS
Refills: 0 | Status: DISCONTINUED | OUTPATIENT
Start: 2021-02-26 | End: 2021-03-01

## 2021-02-26 RX ORDER — DILTIAZEM HCL 120 MG
240 CAPSULE, EXT RELEASE 24 HR ORAL DAILY
Refills: 0 | Status: DISCONTINUED | OUTPATIENT
Start: 2021-02-26 | End: 2021-03-01

## 2021-02-26 RX ORDER — CEFEPIME 1 G/1
1000 INJECTION, POWDER, FOR SOLUTION INTRAMUSCULAR; INTRAVENOUS EVERY 12 HOURS
Refills: 0 | Status: DISCONTINUED | OUTPATIENT
Start: 2021-02-26 | End: 2021-03-01

## 2021-02-26 RX ORDER — HYDROCHLOROTHIAZIDE 25 MG
12.5 TABLET ORAL DAILY
Refills: 0 | Status: DISCONTINUED | OUTPATIENT
Start: 2021-02-26 | End: 2021-02-26

## 2021-02-26 RX ORDER — CEFEPIME 1 G/1
1000 INJECTION, POWDER, FOR SOLUTION INTRAMUSCULAR; INTRAVENOUS EVERY 12 HOURS
Refills: 0 | Status: DISCONTINUED | OUTPATIENT
Start: 2021-02-26 | End: 2021-02-26

## 2021-02-26 RX ORDER — NEBIVOLOL HYDROCHLORIDE 5 MG/1
5 TABLET ORAL DAILY
Refills: 0 | Status: DISCONTINUED | OUTPATIENT
Start: 2021-02-26 | End: 2021-03-01

## 2021-02-26 RX ORDER — LOSARTAN POTASSIUM 100 MG/1
100 TABLET, FILM COATED ORAL AT BEDTIME
Refills: 0 | Status: DISCONTINUED | OUTPATIENT
Start: 2021-02-26 | End: 2021-03-01

## 2021-02-26 RX ORDER — SODIUM CHLORIDE 9 MG/ML
1000 INJECTION INTRAMUSCULAR; INTRAVENOUS; SUBCUTANEOUS
Refills: 0 | Status: DISCONTINUED | OUTPATIENT
Start: 2021-02-26 | End: 2021-02-26

## 2021-02-26 RX ADMIN — ATORVASTATIN CALCIUM 20 MILLIGRAM(S): 80 TABLET, FILM COATED ORAL at 21:05

## 2021-02-26 RX ADMIN — Medication 400 MILLIGRAM(S): at 09:43

## 2021-02-26 RX ADMIN — SODIUM CHLORIDE 35 MILLILITER(S): 5 INJECTION, SOLUTION INTRAVENOUS at 23:24

## 2021-02-26 RX ADMIN — Medication 240 MILLIGRAM(S): at 09:43

## 2021-02-26 RX ADMIN — Medication 400 MILLIGRAM(S): at 21:05

## 2021-02-26 RX ADMIN — LOSARTAN POTASSIUM 100 MILLIGRAM(S): 100 TABLET, FILM COATED ORAL at 21:05

## 2021-02-26 RX ADMIN — Medication 12.5 MILLIGRAM(S): at 09:43

## 2021-02-26 RX ADMIN — ENOXAPARIN SODIUM 40 MILLIGRAM(S): 100 INJECTION SUBCUTANEOUS at 09:42

## 2021-02-26 RX ADMIN — Medication 250 MILLIGRAM(S): at 09:42

## 2021-02-26 RX ADMIN — CEFEPIME 1000 MILLIGRAM(S): 1 INJECTION, POWDER, FOR SOLUTION INTRAMUSCULAR; INTRAVENOUS at 21:05

## 2021-02-26 RX ADMIN — SODIUM CHLORIDE 80 MILLILITER(S): 9 INJECTION INTRAMUSCULAR; INTRAVENOUS; SUBCUTANEOUS at 00:53

## 2021-02-26 RX ADMIN — CEFEPIME 1000 MILLIGRAM(S): 1 INJECTION, POWDER, FOR SOLUTION INTRAMUSCULAR; INTRAVENOUS at 09:42

## 2021-02-26 RX ADMIN — Medication 50 MICROGRAM(S): at 04:41

## 2021-02-26 RX ADMIN — NEBIVOLOL HYDROCHLORIDE 5 MILLIGRAM(S): 5 TABLET ORAL at 09:43

## 2021-02-26 NOTE — CONSULT NOTE ADULT - PROBLEM SELECTOR RECOMMENDATION 9
Patient on cytoreductive therapy with bortezomib and dexamethasone weekly; scheduled to start rituximab this week.  Treatment on hold until current episode of hyponatremia/?  Infection under control.

## 2021-02-26 NOTE — PROVIDER CONTACT NOTE (OTHER) - BACKGROUND
Pt came in d/t progressive confusion. In ED pt Na 119. increased to 123 throughout the day but then went back to 120

## 2021-02-26 NOTE — PROGRESS NOTE ADULT - SUBJECTIVE AND OBJECTIVE BOX
85F with Waldenstrom macroglobulinemia (NHL), HTN, HLD, CAD, hypothyroidism, vision loss, and [place] (with hearing aid) sent by Dr. Locke for hyponatremia. Patient reports some fatigue, but denies other symptoms. Notes she has tried to minimize salt in her diet at the recommendation of her PCP. Denies chest pain, cough, dyspnea, fever, chills.   Per son Toy Byers, patient has been confused about her medications and how to take them, but he feels that this is less true confusion and more her being overwhelmed by her new treatment regimen for Waldenstrom macroglobulinemia. He also reports she has had some insomnia since starting this regimen, which includes steroids. He believes she is taking sleeping pills occasionally, but does not know which ones. Also states patient recently had constipation alleviated by miralax. Patient lives with her , and is independent in ADLs and IADLs. ED: NS 125cc/hr, vanc, cefepime (25 Feb 2021 23:14)    2/26: **    REVIEW OF SYSTEMS:  All other review of systems is negative unless indicated above    PHYSICAL EXAM:  Constitutional: NAD, awake and alert, well-developed  HEENT: PERR, EOMI, Normal Hearing, MMM  Neck: Soft and supple, No LAD, No JVD  Respiratory: Breath sounds are clear bilaterally, No wheezing, rales or rhonchi  Cardiovascular: S1 and S2, regular rate and rhythm, no Murmurs, gallops or rubs  Gastrointestinal: Bowel Sounds present, soft, nontender, nondistended, no guarding, no rebound  Extremities: No peripheral edema  Vascular: 2+ peripheral pulses  Neurological: A/O x 3, no focal deficits  Musculoskeletal: 5/5 strength b/l upper and lower extremities  Skin: No rashes      Vital Signs Last 24 Hrs  T(C): 36.9 (26 Feb 2021 07:50), Max: 37 (25 Feb 2021 13:00)  T(F): 98.4 (26 Feb 2021 07:50), Max: 98.6 (25 Feb 2021 13:00)  HR: 80 (26 Feb 2021 07:50) (69 - 80)  BP: 151/58 (26 Feb 2021 07:50) (137/61 - 159/61)  BP(mean): 92 (25 Feb 2021 14:50) (82 - 92)  RR: 18 (26 Feb 2021 07:50) (16 - 18)  SpO2: 96% (26 Feb 2021 07:50) (88% - 96%)     MEDICATIONS  (STANDING):  acyclovir   Oral Tab/Cap 400 milliGRAM(s) Oral two times a day  atorvastatin 20 milliGRAM(s) Oral at bedtime  cefepime  Injectable. 1000 milliGRAM(s) IV Push every 12 hours  diltiazem    milliGRAM(s) Oral daily  enoxaparin Injectable 40 milliGRAM(s) SubCutaneous daily  hydrochlorothiazide 12.5 milliGRAM(s) Oral daily  levothyroxine 50 MICROGram(s) Oral daily  losartan 100 milliGRAM(s) Oral at bedtime  nebivolol 5 milliGRAM(s) Oral daily  sodium chloride 0.9%. 1000 milliLiter(s) (80 mL/Hr) IV Continuous <Continuous>  vancomycin  IVPB 750 milliGRAM(s) IV Intermittent every 12 hours    MEDICATIONS  (PRN):  acetaminophen   Tablet .. 650 milliGRAM(s) Oral every 6 hours PRN Mild Pain (1 - 3)  ALBUTerol    90 MICROgram(s) HFA Inhaler 1 Puff(s) Inhalation every 4 hours PRN Shortness of Breath and/or Wheezing        LABS: All Labs Reviewed:                        9.7    10.25 )-----------( 329      ( 25 Feb 2021 13:10 )             28.3     02-26    123<L>  |  90<L>  |  17  ----------------------------<  144<H>  3.9   |  27  |  0.56    Ca    8.7      26 Feb 2021 02:57  Mg     2.0     02-25    TPro  8.9<H>  /  Alb  2.7<L>  /  TBili  0.5  /  DBili  x   /  AST  13<L>  /  ALT  28  /  AlkPhos  76  02-25    PT/INR - ( 25 Feb 2021 13:10 )   PT: 14.3 sec;   INR: 1.23 ratio         PTT - ( 25 Feb 2021 13:10 )  PTT:30.1 sec      Blood Culture: Pending     25 Feb 2021 07:01  -  26 Feb 2021 07:00  --------------------------------------------------------  IN: 480 mL / OUT: 0 mL / NET: 480 mL      RADIOLOGY:    Xray Chest 1 View- PORTABLE-Routine (Xray Chest 1 View- PORTABLE-Routine .) (02.25.21 @ 14:00) >  EXAM:  XR CHEST PORTABLE ROUTINE 1V                        PROCEDURE DATE:  02/25/2021    INTERPRETATION:  History: Weakness dyspnea  Chest:  one view.  Comparison: 02/25/2021  AP radiograph of the chest demonstrates unchanged scattered focal infiltrates in the BILATERAL mid lungs and upper and lower lobes. The cardiac silhouette is normal in size. Osseous structures are intact.  Impression: unchanged scattered focal infiltrates in the BILATERAL mid lungs and upper and lower lobes.    CT Head No Cont (02.25.21 @ 14:33) >  EXAM:  CT BRAIN                        PROCEDURE DATE:  02/25/2021    INTERPRETATION:  CLINICAL INFORMATION: ams. . .  TECHNIQUE: Sequential axial images were obtained from the vertex to the skull base without intravenous contrast. Coronal and sagittal reformations were obtained.  COMPARISON: None.  FINDINGS:  There is no acute intracranial hemorrhage or mass effect. There are areas of hypodensity in the bilateral hemispheric white matter suggesting white matter microvascular ischemic change. There is cerebral volume loss.  There is no extraaxial fluid collection.  There is no displaced calvarial fracture. The visualized orbits are within normal limits. The visualized portions of the paranasal sinuses are well aerated.The mastoid air cells are well aerated.  IMPRESSION: No acute intracranial hemorrhage or mass effect.        TELEMETRY REVIEW:  EKG Pending          85F with Waldenstrom macroglobulinemia (NHL), HTN, HLD, CAD, hypothyroidism, vision loss, and [place] (with hearing aid) sent by Dr. Locke for hyponatremia. Patient reports some fatigue, but denies other symptoms. Notes she has tried to minimize salt in her diet at the recommendation of her PCP. Denies chest pain, cough, dyspnea, fever, chills.   Per son Toy Byers, patient has been confused about her medications and how to take them, but he feels that this is less true confusion and more her being overwhelmed by her new treatment regimen for [place]strom macroglobulinemia. He also reports she has had some insomnia since starting this regimen, which includes steroids. He believes she is taking sleeping pills occasionally, but does not know which ones. Also states patient recently had constipation alleviated by miralax. Patient lives with her , and is independent in ADLs and IADLs. ED: NS 125cc/hr, vanc, cefepime (25 Feb 2021 23:14)    2/26: Patient is sitting in chair eating, tolerating po intake well. Denies any complaints.     REVIEW OF SYSTEMS:  All other review of systems is negative unless indicated above    PHYSICAL EXAM:  Constitutional: NAD, awake and alert, well-developed  HEENT: PERRL, EOMI, Normal Hearing, MMM  Neck: Soft and supple, No LAD, No JVD  Respiratory: Breath sounds are clear bilaterally, No wheezing, rales or rhonchi  Cardiovascular: S1 and S2, regular rate and rhythm, no Murmurs, gallops or rubs  Gastrointestinal: Bowel Sounds present, soft, nontender, nondistended, no guarding, no rebound  Extremities: No peripheral edema  Vascular: 2+ peripheral pulses  Neurological: A/O x 3, no focal deficits  Musculoskeletal: 5/5 strength b/l upper and lower extremities  Skin: No rashes      Vital Signs Last 24 Hrs  T(C): 36.9 (26 Feb 2021 07:50), Max: 37 (25 Feb 2021 13:00)  T(F): 98.4 (26 Feb 2021 07:50), Max: 98.6 (25 Feb 2021 13:00)  HR: 80 (26 Feb 2021 07:50) (69 - 80)  BP: 151/58 (26 Feb 2021 07:50) (137/61 - 159/61)  BP(mean): 92 (25 Feb 2021 14:50) (82 - 92)  RR: 18 (26 Feb 2021 07:50) (16 - 18)  SpO2: 96% (26 Feb 2021 07:50) (88% - 96%)     MEDICATIONS  (STANDING):  acyclovir   Oral Tab/Cap 400 milliGRAM(s) Oral two times a day  atorvastatin 20 milliGRAM(s) Oral at bedtime  cefepime  Injectable. 1000 milliGRAM(s) IV Push every 12 hours  diltiazem    milliGRAM(s) Oral daily  enoxaparin Injectable 40 milliGRAM(s) SubCutaneous daily  hydrochlorothiazide 12.5 milliGRAM(s) Oral daily  levothyroxine 50 MICROGram(s) Oral daily  losartan 100 milliGRAM(s) Oral at bedtime  nebivolol 5 milliGRAM(s) Oral daily  sodium chloride 0.9%. 1000 milliLiter(s) (80 mL/Hr) IV Continuous <Continuous>  vancomycin  IVPB 750 milliGRAM(s) IV Intermittent every 12 hours    MEDICATIONS  (PRN):  acetaminophen   Tablet .. 650 milliGRAM(s) Oral every 6 hours PRN Mild Pain (1 - 3)  ALBUTerol    90 MICROgram(s) HFA Inhaler 1 Puff(s) Inhalation every 4 hours PRN Shortness of Breath and/or Wheezing        LABS: All Labs Reviewed:                        9.7    10.25 )-----------( 329      ( 25 Feb 2021 13:10 )             28.3     02-26    123<L>  |  90<L>  |  17  ----------------------------<  144<H>  3.9   |  27  |  0.56    Ca    8.7      26 Feb 2021 02:57  Mg     2.0     02-25    TPro  8.9<H>  /  Alb  2.7<L>  /  TBili  0.5  /  DBili  x   /  AST  13<L>  /  ALT  28  /  AlkPhos  76  02-25    PT/INR - ( 25 Feb 2021 13:10 )   PT: 14.3 sec;   INR: 1.23 ratio         PTT - ( 25 Feb 2021 13:10 )  PTT:30.1 sec      Blood Culture: Pending     25 Feb 2021 07:01  -  26 Feb 2021 07:00  --------------------------------------------------------  IN: 480 mL / OUT: 0 mL / NET: 480 mL      RADIOLOGY:    Xray Chest 1 View- PORTABLE-Routine (Xray Chest 1 View- PORTABLE-Routine .) (02.25.21 @ 14:00) >  EXAM:  XR CHEST PORTABLE ROUTINE 1V                        PROCEDURE DATE:  02/25/2021    INTERPRETATION:  History: Weakness dyspnea  Chest:  one view.  Comparison: 02/25/2021  AP radiograph of the chest demonstrates unchanged scattered focal infiltrates in the BILATERAL mid lungs and upper and lower lobes. The cardiac silhouette is normal in size. Osseous structures are intact.  Impression: unchanged scattered focal infiltrates in the BILATERAL mid lungs and upper and lower lobes.    CT Head No Cont (02.25.21 @ 14:33) >  EXAM:  CT BRAIN                        PROCEDURE DATE:  02/25/2021    INTERPRETATION:  CLINICAL INFORMATION: ams. . .  TECHNIQUE: Sequential axial images were obtained from the vertex to the skull base without intravenous contrast. Coronal and sagittal reformations were obtained.  COMPARISON: None.  FINDINGS:  There is no acute intracranial hemorrhage or mass effect. There are areas of hypodensity in the bilateral hemispheric white matter suggesting white matter microvascular ischemic change. There is cerebral volume loss.  There is no extraaxial fluid collection.  There is no displaced calvarial fracture. The visualized orbits are within normal limits. The visualized portions of the paranasal sinuses are well aerated.The mastoid air cells are well aerated.  IMPRESSION: No acute intracranial hemorrhage or mass effect.        TELEMETRY REVIEW:  EKG Pending

## 2021-02-26 NOTE — CONSULT NOTE ADULT - SUBJECTIVE AND OBJECTIVE BOX
Patient is a 85y Female whom presented to the hospital with Waldenstrom macroglobulinemia (NHL), HTN, HLD, CAD, hypothyroidism, vision loss, and [place] (with hearing aid) admitted on 2/25 for evaluation of hyponatremia. Patient is fatigued and has mild cough prior to admit, noted with pna and started on abx. Patient was started on IVF and rising Na today (this AM). Reports intake was poor as well at home when not feeling well.     PAST MEDICAL & SURGICAL HISTORY:  Hypothyroidism    CAD (coronary artery disease)    HLD (hyperlipidemia)    Waldenstrom&#x27;s disease    Hypertension    S/P hysterectomy        MEDICATIONS  (STANDING):  acyclovir   Oral Tab/Cap 400 milliGRAM(s) Oral two times a day  atorvastatin 20 milliGRAM(s) Oral at bedtime  cefepime  Injectable. 1000 milliGRAM(s) IV Push every 12 hours  diltiazem    milliGRAM(s) Oral daily  enoxaparin Injectable 40 milliGRAM(s) SubCutaneous daily  hydrochlorothiazide 12.5 milliGRAM(s) Oral daily  levothyroxine 50 MICROGram(s) Oral daily  losartan 100 milliGRAM(s) Oral at bedtime  nebivolol 5 milliGRAM(s) Oral daily  sodium chloride 0.9%. 1000 milliLiter(s) (80 mL/Hr) IV Continuous <Continuous>    MEDICATIONS  (PRN):  acetaminophen   Tablet .. 650 milliGRAM(s) Oral every 6 hours PRN Mild Pain (1 - 3)  ALBUTerol    90 MICROgram(s) HFA Inhaler 1 Puff(s) Inhalation every 4 hours PRN Shortness of Breath and/or Wheezing      Allergies    No Known Allergies    Intolerances        SOCIAL HISTORY:  no etoh/cigg now    FAMILY HISTORY:  No pertinent family history in first degree relatives        REVIEW OF SYSTEMS:    CONSTITUTIONAL: stable weakness, fevers or chills  EYES/ENT: No visual changes;  No vertigo or throat pain   NECK: No pain or stiffness  RESPIRATORY: No cough, wheezing, hemoptysis; No shortness of breath  CARDIOVASCULAR: No chest pain or palpitations  GASTROINTESTINAL: No abdominal or epigastric pain. No nausea, vomiting, or hematemesis; No diarrhea or constipation. No melena or hematochezia.  GENITOURINARY: No dysuria, frequency or hematuria  NEUROLOGICAL: No numbness or weakness  SKIN: No itching, burning, rashes, or lesions   All other review of systems is negative unless indicated above.      T(C): , Max: 36.9 (02-26-21 @ 07:50)  T(F): , Max: 98.4 (02-26-21 @ 07:50)  HR: 73 (02-26-21 @ 15:15)  BP: 153/57 (02-26-21 @ 15:15)  BP(mean): --  RR: 18 (02-26-21 @ 15:15)  SpO2: 97% (02-26-21 @ 15:15)  Wt(kg): --    02-25 @ 07:01  -  02-26 @ 07:00  --------------------------------------------------------  IN: 480 mL / OUT: 0 mL / NET: 480 mL      Height (cm): 156.2 (02-26 @ 13:12)  Weight (kg): 68 (02-26 @ 13:12)  BMI (kg/m2): 27.9 (02-26 @ 13:12)  BSA (m2): 1.68 (02-26 @ 13:12)    PHYSICAL EXAM:    Constitutional: NAD, frail  HEENT: PERRLA, EOMI,  MMM  Neck: No LAD, No JVD  Respiratory: dist  Cardiovascular: S1 and S2  Extremities: No peripheral edema  Neurological: A/O x 3, no focal deficits  Psychiatric: Normal mood, normal affect  : No [place]  Skin: No rashes  Access: Not applicable        LABS:                        9.7    10.25 )-----------( 329      ( 25 Feb 2021 13:10 )             28.3     26 Feb 2021 02:57    123    |  90     |  17     ----------------------------<  144    3.9     |  27     |  0.56   25 Feb 2021 21:59    123    |  89     |  18     ----------------------------<  163    3.8     |  25     |  0.58   25 Feb 2021 13:10    120    |  86     |  20     ----------------------------<  163    4.2     |  26     |  0.84   25 Feb 2021 00:39    119    |  82     |  17     ----------------------------<  183    4.6     |  23     |  0.71     Ca    8.7        26 Feb 2021 02:57  Ca    9.2        25 Feb 2021 21:59  Ca    9.9        25 Feb 2021 13:10  Ca    9.9        25 Feb 2021 00:39  Mg     2.0       25 Feb 2021 21:59    TPro  8.9    /  Alb  2.7    /  TBili  0.5    /  DBili  x      /  AST  13     /  ALT  28     /  AlkPhos  76     25 Feb 2021 13:10  TPro  8.5    /  Alb  3.7    /  TBili  0.6    /  DBili  x      /  AST  16     /  ALT  24     /  AlkPhos  85     25 Feb 2021 00:39  TPro  8.5    /  Alb  3.5    /  TBili  x      /  DBili  x      /  AST  x      /  ALT  x      /  AlkPhos  x      25 Feb 2021 00:34      Osmolality, Serum: 259 mosmol/kg <L> [280 - 301] (02-25 @ 21:59)      Urine Studies:    Osmolality, Random Urine: 632 mosm/Kg (02-25 @ 22:11)        RADIOLOGY & ADDITIONAL STUDIES:                
Patient is a 85y old  Female who presents with a chief complaint of Hyponatremia and encephalopathy (2021 10:19)    HPI:  85F with Waldenstrom macroglobulinemia (NHL), HTN, HLD, CAD, hypothyroidism, vision loss, and [place] (with hearing aid) admitted on  for evaluation of low sodium from her oncologist office; the patient is fatigued and has mild cough; she is not around any sick contacts and overall the history per medical record as patient is unable to provide history.           PMH: as above  PSH: as above  Meds: per reconciliation sheet, noted below  MEDICATIONS  (STANDING):  acyclovir   Oral Tab/Cap 400 milliGRAM(s) Oral two times a day  atorvastatin 20 milliGRAM(s) Oral at bedtime  cefepime  Injectable. 1000 milliGRAM(s) IV Push every 12 hours  diltiazem    milliGRAM(s) Oral daily  enoxaparin Injectable 40 milliGRAM(s) SubCutaneous daily  hydrochlorothiazide 12.5 milliGRAM(s) Oral daily  levothyroxine 50 MICROGram(s) Oral daily  losartan 100 milliGRAM(s) Oral at bedtime  nebivolol 5 milliGRAM(s) Oral daily  sodium chloride 0.9%. 1000 milliLiter(s) (80 mL/Hr) IV Continuous <Continuous>    MEDICATIONS  (PRN):  acetaminophen   Tablet .. 650 milliGRAM(s) Oral every 6 hours PRN Mild Pain (1 - 3)  ALBUTerol    90 MICROgram(s) HFA Inhaler 1 Puff(s) Inhalation every 4 hours PRN Shortness of Breath and/or Wheezing    Allergies    No Known Allergies    Intolerances      Social: no smoking, no alcohol, no illegal drugs; no recent travel, no exposure to TB  FAMILY HISTORY:  No pertinent family history in first degree relatives       ROS unable to obtain secondary to patient medical condition     Vital Signs Last 24 Hrs  T(C): 36.9 (2021 07:50), Max: 36.9 (2021 07:50)  T(F): 98.4 (2021 07:50), Max: 98.4 (2021 07:50)  HR: 80 (2021 07:50) (69 - 80)  BP: 151/58 (2021 07:50) (137/61 - 159/61)  BP(mean): 92 (2021 14:50) (92 - 92)  RR: 18 (2021 07:50) (16 - 18)  SpO2: 96% (2021 07:50) (88% - 96%)  Daily     Daily Weight in k.4 (2021 01:14)    PE:    Constitutional: frail looking  HEENT: NC/AT, EOMI, PERRLA, conjunctivae clear; ears and nose atraumatic; pharynx clear  Neck: supple; thyroid not palpable  Back: no tenderness  Respiratory: respiratory effort normal; clear to auscultation  Cardiovascular: S1S2 regular, no murmurs  Abdomen: soft, not tender, not distended, positive BS; no liver or spleen organomegaly  Genitourinary: no suprapubic tenderness  Musculoskeletal: no muscle tenderness, no joint swelling or tenderness  Neurological/ Psychiatric: moving all extremities  Skin: no rashes; no palpable lesions    Labs: all available labs reviewed                        9.7    10.25 )-----------( 329      ( 2021 13:10 )             28.3     02-    123<L>  |  90<L>  |  17  ----------------------------<  144<H>  3.9   |  27  |  0.56    Ca    8.7      2021 02:57  Mg     2.0         TPro  8.9<H>  /  Alb  2.7<L>  /  TBili  0.5  /  DBili  x   /  AST  13<L>  /  ALT  28  /  AlkPhos  76  02-25     LIVER FUNCTIONS - ( 2021 13:10 )  Alb: 2.7 g/dL / Pro: 8.9 gm/dL / ALK PHOS: 76 U/L / ALT: 28 U/L / AST: 13 U/L / GGT: x           COVID-19 PCR . (21 @ 13:10)    COVID-19 PCR: NotDetec: You can help in the fight against COVID-19. [facility] may contact  you to see if you are interested in voluntarily participating in one of  our clinical trials.  Testing is performed using polymerase chain reaction (PCR) or  transcription mediated amplification (TMA). This COVID-19 (SARS-CoV-2)  nucleic acid amplification test was validated by [facility] and is  in use under the FDA Emergency Use Authorization (EUA) for clinical labs  CLIA-certified to perform high complexity testing. Test results should be  correlated with clinical presentation, patient history, and epidemiology.          Radiology: all available radiological tests reviewed  < from: Xray Chest 1 View- PORTABLE-Routine (Xray Chest 1 View- PORTABLE-Routine .) (21 @ 14:00) >    EXAM:  XR CHEST PORTABLE ROUTINE 1V                            PROCEDURE DATE:  2021          INTERPRETATION:  History: Weakness dyspnea    Chest:  one view.    Comparison: 2021    AP radiograph of the chest demonstrates unchanged scattered focal infiltrates in the BILATERAL mid lungs and upper and lower lobes. The cardiac silhouette is normal in size. Osseous structures are intact.    Impression:unchanged scattered focal infiltrates in the BILATERAL mid lungs and upper and lower lobes.    < end of copied text >    Advanced directives addressed: full resuscitation
RACHELLE BADILLO,  85y Female  MRN: [id number]  ATTENDING: Dr. Mark Grimaldo      HPI:  85F with past medical history of hypertension, hyperlipidemia, CAD, hypothyroidism, [place] E[place] macroglobulinemia (bone marrow biopsy 12/15/2020 showing extensive low-grade B-cell lymphoproliferative infiltrate; IgM 5g), treated in ambulatory with weekly dexamethasone/bortezomib ( last dose 2/17/21) referred to the emergency room with hyponatremia (sodium 119), fatigue, mild cough, and reported episodes of confusion.  Patient was scheduled to start treatment with rituximab in ambulatory, after she refused cyclophosphamide.  This morning she feels a lot better, her sodium at 123. CT head showed no acute intracranial hemorrhage or mass effect. Started on empiric Cefepime ; tested negative for covid in ambulatory.    PAST MEDICAL & SURGICAL HISTORY:  Hypothyroidism  CAD (coronary artery disease)  HLD (hyperlipidemia)  Waldenstrom&#x27;s disease  Hypertension  S/P hysterectomy    MEDICATION:  acyclovir   Oral Tab/Cap 400 milliGRAM(s) Oral two times a day  atorvastatin 20 milliGRAM(s) Oral at bedtime  cefepime  Injectable. 1000 milliGRAM(s) IV Push every 12 hours  diltiazem    milliGRAM(s) Oral daily  enoxaparin Injectable 40 milliGRAM(s) SubCutaneous daily  levothyroxine 50 MICROGram(s) Oral daily  losartan 100 milliGRAM(s) Oral at bedtime  nebivolol 5 milliGRAM(s) Oral daily  sodium chloride 0.9%. 1000 milliLiter(s) IV Continuous <Continuous>    ALLERGIES:  No Known Allergies    FAMILY HISTORY:  Reviewed, non-contributory: [ x ]     SOCIAL HISTORY:  Tobacco: YES [ ]  ; NO [ x ]; Former smoker [ ]  Alcohol:   YES [ ]  ; NO [ x ]; Social alcohol user [ ]  Occupation/ marital status/ children: lives independently, ; has children    REVIEW SYSTEMS:  Constitutional: no fever, chills, night sweats, no weight loss  HEENT: denies visual changes; no oral ulcers, dysphagia, no epistaxis; oses corrective lenses  Respiratory: no dyspnea , mild cough, no hemoptysis  Cardiovascular: denies acute chest pain, palpitations  GI: no loss of appetite, dark stools, or abdominal tenderness / pain; no change in bowel habits.  Musculoskeletal: no new back pain, bone/ joint pain ,no extremity swelling  Integumentary: denies pruritus; no skin rash, bruises, no  suspicious skin lesions  Neurologic: denies peripheral numbness, occasionally confused  Heme: no reported easy bruisability; no lymph node enlargement    VITALS:  T(C): 36.7, Max: 36.9 (02-26-21 @ 07:50)  T(F): 98.1, Max: 98.4 (02-26-21 @ 07:50)  HR: 73 (69 - 80)  BP: 153/57 (137/61 - 159/61)  SpO2: 97% (88% - 97%)    PHYSICAL EXAM:  Constitutional: alert, well developed  HEENT: normocephalic, anicteric sclerae, no mucositis or thrush  Respiratory: bilateral clear to auscultation anteriorly  Cardiovascular : S1, S2 regular, rhythmic, no murmurs, gallops or rubs  Abdomen: soft, distended, + normoactive BS, no palpable HS- megaly  Extremities: no tenderness;  -c/c/e, pulses equal bilaterally  Integumentary: no rashes, scars, or lesions suggestive of malignancy  Neurologic: no gross focal deficits    LABS:  (02-25) WBC: 10.25 K/uL,Hemoglobin: 9.7 g/dL, Hematocrit: 28.3 %,  Platelet: 329 K/uL  (02-26) Na: 123 mmol/L ; K: 3.9 mmol/L ; BUN: 17 mg/dL ; Cr: 0.56 mg/dL.  PT/INR - ( 25 Feb 2021 13:10 )   PT: 14.3 sec;   INR: 1.23 ratio  PTT - ( 25 Feb 2021 13:10 )  PTT:30.1 sec    RADIOLOGY:  EXAM:  CT BRAIN                        PROCEDURE DATE:  02/25/2021    INTERPRETATION:  CLINICAL INFORMATION: ams. . .  TECHNIQUE: Sequential axial images were obtained from the vertex to the skull base without intravenous contrast. Coronal and sagittal reformations were obtained.  COMPARISON: None.  FINDINGS:  There is no acute intracranial hemorrhage or mass effect. There are areas of hypodensity in the bilateral hemispheric white matter suggesting white matter microvascular ischemic change. There is cerebral volume loss.  There is no extraaxial fluid collection.  There is no displaced calvarial fracture. The visualized orbits are within normal limits. The visualized portions of the paranasal sinuses are well aerated.The mastoid air cells are well aerated.    IMPRESSION: No acute intracranial hemorrhage or mass effect.

## 2021-02-26 NOTE — DIETITIAN INITIAL EVALUATION ADULT. - PERTINENT LABORATORY DATA
02-26    123<L>  |  90<L>  |  17  ----------------------------<  144<H>  3.9   |  27  |  0.56    Ca    8.7      26 Feb 2021 02:57  Mg     2.0     02-25    TPro  8.9<H>  /  Alb  2.7<L>  /  TBili  0.5  /  DBili  x   /  AST  13<L>  /  ALT  28  /  AlkPhos  76  02-25

## 2021-02-26 NOTE — DIETITIAN INITIAL EVALUATION ADULT. - ADD RECOMMEND
1) continue with liberalized regular diet and encourage protein enriched foods with all meals. 2) Ensure enlive 8oz po BID and Gelatein plus jello po BID 3) Maintain aspiration precautions, back of bed >35 degrees. 4) monitor lytes and hydration replete prn. 5) monitor weights daily.

## 2021-02-26 NOTE — PROVIDER CONTACT NOTE (OTHER) - SITUATION
spoke with Sandhya, office made aware of consult
left consult with answering service
Patient sodium 120

## 2021-02-26 NOTE — DIETITIAN INITIAL EVALUATION ADULT. - PERTINENT MEDS FT
MEDICATIONS  (STANDING):  acyclovir   Oral Tab/Cap 400 milliGRAM(s) Oral two times a day  atorvastatin 20 milliGRAM(s) Oral at bedtime  cefepime  Injectable. 1000 milliGRAM(s) IV Push every 12 hours  diltiazem    milliGRAM(s) Oral daily  enoxaparin Injectable 40 milliGRAM(s) SubCutaneous daily  hydrochlorothiazide 12.5 milliGRAM(s) Oral daily  levothyroxine 50 MICROGram(s) Oral daily  losartan 100 milliGRAM(s) Oral at bedtime  nebivolol 5 milliGRAM(s) Oral daily  sodium chloride 0.9%. 1000 milliLiter(s) (80 mL/Hr) IV Continuous <Continuous>    MEDICATIONS  (PRN):  acetaminophen   Tablet .. 650 milliGRAM(s) Oral every 6 hours PRN Mild Pain (1 - 3)  ALBUTerol    90 MICROgram(s) HFA Inhaler 1 Puff(s) Inhalation every 4 hours PRN Shortness of Breath and/or Wheezing

## 2021-02-26 NOTE — CONSULT NOTE ADULT - PROBLEM SELECTOR RECOMMENDATION 2
Severe hyponatremia–patient's diuretic (hydrochlorothiazide) discontinued, as sodium is steadily improving.  Continue cortisol supplement in a.m. per nephrology, IV normal saline.  Close monitoring of electrolytes.

## 2021-02-26 NOTE — CONSULT NOTE ADULT - ASSESSMENT
85 with Waldenstrom macroglobulinemia (NHL), HTN, HLD, CAD, hypothyroidism, vision loss, and [place] (with hearing aid) admitted on 2/25 for evaluation of hyponatremia. Suspect hyponatremia due to PNA, other consideration is psuedohyponatremia due to hyperproteinemia but less likely with stable Na on prior admits (when serum protein was higher). HCTZ likely playing a role as well    Hyponatremia  -Repeat stat bmp pending  -Urine na  -Cortisol in AM  -Maintain IVF for now, if Na not improving with NS may need 1.5%  -Stop of HCTZ now and with discharge    PNA  -IV abx med    HTN  -ARB ok, no further hctZ    thanks, will follow  d/c with Dr Grimaldo, RN staff
85F with past medical history of hypertension, hyperlipidemia, CAD, hypothyroidism, [place] Erika macroglobulinemia (bone marrow biopsy 12/15/2020 showing extensive low-grade B-cell lymphoproliferative infiltrate; IgM 5g), treated in ambulatory with weekly dexamethasone/bortezomib ( last dose 2/17/21) referred to the emergency room with hyponatremia (sodium 119), fatigue, mild cough, and reported episodes of confusion.  Patient was scheduled to start treatment with rituximab in ambulatory, after she refused cyclophosphamide.  This morning she feels a lot better, her sodium at 123. CT head showed no acute intracranial hemorrhage or mass effect. Started on empiric Cefepime ; tested negative for covid in ambulatory.
85F with Waldenstrom macroglobulinemia (NHL), HTN, HLD, CAD, hypothyroidism, vision loss, and [place] (with hearing aid) admitted on 2/25 for evaluation of low sodium from her oncologist office; the patient is fatigued and has mild cough; she is not around any sick contacts and overall the history per medical record as patient is unable to provide history.   1. Patient admitted with mild confusion, low sodium levels and imaging consistent with  pneumonia; no evidence to suggest COVID-19 infection  - follow up cultures   - serial cbc and monitor temperature   - oxygen and nebs as needed   - reviewed prior medical records to evaluate for resistant or atypical pathogens   - iv hydration and supportive care and correction of sodium per medicine  - will continue cefepime as ordered  - hold on further vancomycin for now  - can discontinue isolation  2. other issues: per medicine

## 2021-02-26 NOTE — PROGRESS NOTE ADULT - ATTENDING COMMENTS
Patient seen and examined with NP [place].  I was physically present for the key portions of the evaluation and management (E/M) service provided.  I agree with the above history, physical, and plan which I have reviewed and edited where appropriate.   - ID sharri noted.  agree that this is not likely COVID and likely bacterial pna.  continue cefepime  - met encephalopathy 2/2 to pna and hyponatremia - improving  - hyponatremia - improving.  continue IVF.  renal consult pending.   - waldenstroms - onc follow up with dr jewell

## 2021-02-26 NOTE — PROGRESS NOTE ADULT - ASSESSMENT
85F with Waldenstrom macroglobulinemia, HTN, HLD, CAD, hypothyroidism, vision loss, and [place] presents for hyponatremia. Asymptomatic save for fatigue and possible mild confusion.    #Metabolic encephalopathy secondary to hyponatremia  -Mild confusion likely secondary to hyponatremia. CT head demonstrated no acute findings  -Acute on chronic hyponatremia. There is likely a dietary component as patient intentionally reduced salt intake. May also have SIADH secondary to chemotherapy and/or pneumonia  -Goal of increase 4-6 mmol Na per 24hr period. Already corrected from 119 to 123. Will reduce NS rate from 125 to 80cc/hr  -Liberate diet to include normal salt intake    #Acute hypoxia, Secondary to pneumonia  -Requiring 2L nasal cannula; desaturated to 88% on room air  -Unclear if patient has true pneumonia. Scattered infiltrates on CXR reported to be chronic. Patient has no fever, chills, cough, or dyspnea, though does have hypoxia and hyponatremia. -Will continue vanc/cefepime and monitor  -ID Consult pending to confirm if abx necessary for possible PNA    #Chronic anemia  -At b/l. Secondary to lymphoma    #Protein calorie malnutrition  -[place] diet  -Nutrition consult pending     Continue home medications for chronic conditions  #Waldenstrom macroglobulinemia   - Acyclovir 400mg BID for PPx. Due for chemotherapy on Wednesday; anticipate d/c prior to next session  - Patient sees Dr. Caldera who sent patient in for evaluation; Pending consult.     #HTN   - Diltiazem XR 240mg daily, HCTZ 12.5mg daily, losartan 100mg qhs, nebivolol 5mg qhs    #HLD/CAD  - Atorvastatin 20mg qhs    #Hypothyroidism   - Synthroid 50mcg daily    #Advance care planning  -HCP: Mary Kay Mckeon (daughter) [phone] (per verbal patient request; patient has not completed a HCP form)  -Patient prefers FULL CODE, including resuscitation and intubation as indicated    #DVT ppx  -Lovenox 40mg subcu daily  IMPROVE VTE Individual Risk Assessment 3 for age and current CA     DISPO: D/C once hem/onc consult obtained, ID cleared, and hyponatremia resolves. Patient should be moved to non-covid floor given negative covid test/ PUI.     Family Communication:   85F with Waldenstrom macroglobulinemia, HTN, HLD, CAD, hypothyroidism, vision loss, and [place] presents for hyponatremia. Asymptomatic save for fatigue and possible mild confusion.    #Metabolic encephalopathy secondary to hyponatremia  -Mild confusion likely secondary to hyponatremia. CT head demonstrated no acute findings  -Acute on chronic hyponatremia. There is likely a dietary component as patient intentionally reduced salt intake. May also have SIADH secondary to chemotherapy and/or pneumonia  -Goal of increase 4-6 mmol Na per 24hr period. Already corrected from 119 to 123. Will reduce NS rate from 125 to 80cc/hr  -Liberate diet to include normal salt intake  - Nephrology consult pending     #Acute hypoxia, Secondary to pneumonia  -Requiring 2L nasal cannula; desaturated to 88% on room air  -Unclear if patient has true pneumonia. Scattered infiltrates on CXR reported to be chronic. Patient has no fever, chills, cough, or dyspnea, though does have hypoxia and hyponatremia. -Will continue vanc/cefepime and monitor  -ID Consult pending to confirm if abx necessary for possible PNA    #Chronic anemia  -At b/l. Secondary to lymphoma    #Protein calorie malnutrition  -[place] diet  -Nutrition consult pending     Continue home medications for chronic conditions  #Waldenstrom macroglobulinemia   - Acyclovir 400mg BID for PPx. Due for chemotherapy on Wednesday; anticipate d/c prior to next session  - Patient sees Dr. Caldera who sent patient in for evaluation; Pending consult.     #HTN   - Diltiazem XR 240mg daily, HCTZ 12.5mg daily, losartan 100mg qhs, nebivolol 5mg qhs    #HLD/CAD  - Atorvastatin 20mg qhs    #Hypothyroidism   - Synthroid 50mcg daily    #Advance care planning  -HCP: Mary Kay Mckeon (daughter) [phone] (per verbal patient request; patient has not completed a HCP form)  -Patient prefers FULL CODE, including resuscitation and intubation as indicated    #DVT ppx  -Lovenox 40mg subcu daily  IMPROVE VTE Individual Risk Assessment 3 for age and current CA     DISPO: D/C once hem/onc consult obtained, ID cleared, and hyponatremia resolves. Patient should be moved to non-covid floor given negative covid test/ PUI.     Family Communication: Family updated daily Mary Kay Okeefe [phone]

## 2021-02-26 NOTE — DIETITIAN INITIAL EVALUATION ADULT. - OTHER INFO
85F with Waldenstrom macroglobulinemia (NHL), HTN, HLD, CAD, hypothyroidism, vision loss, and [place] (with hearing aid) sent by Dr. Locke for hyponatremia. Patient reports some fatigue, but denies other symptoms. Notes she has tried to minimize salt in her diet at the recommendation of her PCP. Denies chest pain, cough, dyspnea, fever, chills.  Also states patient recently had constipation alleviated by Miralax. Last BM 2/25 x 1 day ago. No bowel meds ordered at this time. Pt with fair po intake 50-75%. Stated not a big breakfast eater for many years. Denies any N/V/D/C at this time. NFPE indicates mild upper body muscle loss. Skin is intact. Hyponatremia slightly improving. 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 Unable to obtain weight history as pt is a poor historian. Will provide liberalized diet as ordered Regular. Suggest additional po supplements to provide protein/energy for increased metabolic needs 2/2 CA. Will continue to monitor and follow up prn.

## 2021-02-26 NOTE — DIETITIAN INITIAL EVALUATION ADULT. - MALNUTRITION
moderate protein/calorie malnutrition moderate protein/calorie malnutrition in context of acute on chronic illness

## 2021-02-26 NOTE — DIETITIAN NUTRITION RISK NOTIFICATION - ADDITIONAL COMMENTS/DIETITIAN RECOMMENDATIONS
· Additional Recommendations  1) continue with liberalized regular diet and encourage protein enriched foods with all meals.   2) Ensure enlive 8oz po BID and Gelatein plus jello po BID   3) Maintain aspiration precautions, back of bed >35 degrees.   4) monitor lytes and hydration replete prn.   5) monitor weights daily.

## 2021-02-27 LAB
ALBUMIN SERPL ELPH-MCNC: 2.3 G/DL — LOW (ref 3.3–5)
ALP SERPL-CCNC: 62 U/L — SIGNIFICANT CHANGE UP (ref 40–120)
ALT FLD-CCNC: 23 U/L — SIGNIFICANT CHANGE UP (ref 12–78)
ANION GAP SERPL CALC-SCNC: 5 MMOL/L — SIGNIFICANT CHANGE UP (ref 5–17)
ANION GAP SERPL CALC-SCNC: 9 MMOL/L — SIGNIFICANT CHANGE UP (ref 5–17)
AST SERPL-CCNC: 12 U/L — LOW (ref 15–37)
BILIRUB SERPL-MCNC: 0.7 MG/DL — SIGNIFICANT CHANGE UP (ref 0.2–1.2)
BUN SERPL-MCNC: 20 MG/DL — SIGNIFICANT CHANGE UP (ref 7–23)
BUN SERPL-MCNC: SIGNIFICANT CHANGE UP MG/DL (ref 7–23)
CALCIUM SERPL-MCNC: 8.4 MG/DL — LOW (ref 8.5–10.1)
CALCIUM SERPL-MCNC: 8.8 MG/DL — SIGNIFICANT CHANGE UP (ref 8.5–10.1)
CHLORIDE SERPL-SCNC: 88 MMOL/L — LOW (ref 96–108)
CHLORIDE SERPL-SCNC: 89 MMOL/L — LOW (ref 96–108)
CO2 SERPL-SCNC: 26 MMOL/L — SIGNIFICANT CHANGE UP (ref 22–31)
CO2 SERPL-SCNC: 27 MMOL/L — SIGNIFICANT CHANGE UP (ref 22–31)
CREAT SERPL-MCNC: 0.53 MG/DL — SIGNIFICANT CHANGE UP (ref 0.5–1.3)
CREAT SERPL-MCNC: 0.58 MG/DL — SIGNIFICANT CHANGE UP (ref 0.5–1.3)
GLUCOSE SERPL-MCNC: 105 MG/DL — HIGH (ref 70–99)
GLUCOSE SERPL-MCNC: 89 MG/DL — SIGNIFICANT CHANGE UP (ref 70–99)
HCT VFR BLD CALC: 25.5 % — LOW (ref 34.5–45)
HGB BLD-MCNC: 8.8 G/DL — LOW (ref 11.5–15.5)
MCHC RBC-ENTMCNC: 30.4 PG — SIGNIFICANT CHANGE UP (ref 27–34)
MCHC RBC-ENTMCNC: 34.5 GM/DL — SIGNIFICANT CHANGE UP (ref 32–36)
MCV RBC AUTO: 88.2 FL — SIGNIFICANT CHANGE UP (ref 80–100)
PLATELET # BLD AUTO: 350 K/UL — SIGNIFICANT CHANGE UP (ref 150–400)
POTASSIUM SERPL-MCNC: 3.7 MMOL/L — SIGNIFICANT CHANGE UP (ref 3.5–5.3)
POTASSIUM SERPL-MCNC: 3.7 MMOL/L — SIGNIFICANT CHANGE UP (ref 3.5–5.3)
POTASSIUM SERPL-SCNC: 3.7 MMOL/L — SIGNIFICANT CHANGE UP (ref 3.5–5.3)
POTASSIUM SERPL-SCNC: 3.7 MMOL/L — SIGNIFICANT CHANGE UP (ref 3.5–5.3)
PROT SERPL-MCNC: 7.7 GM/DL — SIGNIFICANT CHANGE UP (ref 6–8.3)
RBC # BLD: 2.89 M/UL — LOW (ref 3.8–5.2)
RBC # FLD: 16.2 % — HIGH (ref 10.3–14.5)
SARS-COV-2 IGG SERPL QL IA: NEGATIVE — SIGNIFICANT CHANGE UP
SARS-COV-2 IGM SERPL IA-ACNC: 0.07 INDEX — SIGNIFICANT CHANGE UP
SODIUM SERPL-SCNC: 121 MMOL/L — LOW (ref 135–145)
SODIUM SERPL-SCNC: 123 MMOL/L — LOW (ref 135–145)
SODIUM UR-SCNC: 76 MMOL/L — SIGNIFICANT CHANGE UP
WBC # BLD: 10.57 K/UL — HIGH (ref 3.8–10.5)
WBC # FLD AUTO: 10.57 K/UL — HIGH (ref 3.8–10.5)

## 2021-02-27 PROCEDURE — 99232 SBSQ HOSP IP/OBS MODERATE 35: CPT

## 2021-02-27 RX ORDER — POLYETHYLENE GLYCOL 3350 17 G/17G
17 POWDER, FOR SOLUTION ORAL
Refills: 0 | Status: DISCONTINUED | OUTPATIENT
Start: 2021-02-27 | End: 2021-03-01

## 2021-02-27 RX ADMIN — Medication 240 MILLIGRAM(S): at 09:42

## 2021-02-27 RX ADMIN — Medication 400 MILLIGRAM(S): at 20:22

## 2021-02-27 RX ADMIN — ATORVASTATIN CALCIUM 20 MILLIGRAM(S): 80 TABLET, FILM COATED ORAL at 20:22

## 2021-02-27 RX ADMIN — ENOXAPARIN SODIUM 40 MILLIGRAM(S): 100 INJECTION SUBCUTANEOUS at 09:41

## 2021-02-27 RX ADMIN — Medication 100 MILLIGRAM(S): at 09:42

## 2021-02-27 RX ADMIN — Medication 50 MICROGRAM(S): at 05:26

## 2021-02-27 RX ADMIN — Medication 400 MILLIGRAM(S): at 09:42

## 2021-02-27 RX ADMIN — Medication 100 MILLIGRAM(S): at 02:59

## 2021-02-27 RX ADMIN — LOSARTAN POTASSIUM 100 MILLIGRAM(S): 100 TABLET, FILM COATED ORAL at 20:22

## 2021-02-27 RX ADMIN — Medication 100 MILLIGRAM(S): at 20:22

## 2021-02-27 RX ADMIN — NEBIVOLOL HYDROCHLORIDE 5 MILLIGRAM(S): 5 TABLET ORAL at 09:42

## 2021-02-27 RX ADMIN — CEFEPIME 1000 MILLIGRAM(S): 1 INJECTION, POWDER, FOR SOLUTION INTRAMUSCULAR; INTRAVENOUS at 20:22

## 2021-02-27 RX ADMIN — POLYETHYLENE GLYCOL 3350 17 GRAM(S): 17 POWDER, FOR SOLUTION ORAL at 20:23

## 2021-02-27 RX ADMIN — CEFEPIME 1000 MILLIGRAM(S): 1 INJECTION, POWDER, FOR SOLUTION INTRAMUSCULAR; INTRAVENOUS at 09:41

## 2021-02-27 NOTE — PROGRESS NOTE ADULT - SUBJECTIVE AND OBJECTIVE BOX
85F with Waldenstrom macroglobulinemia (NHL), HTN, HLD, CAD, hypothyroidism, vision loss, and [place] (with hearing aid) sent by Dr. Locke for hyponatremia. Patient reports some fatigue, but denies other symptoms. Notes she has tried to minimize salt in her diet at the recommendation of her PCP. Denies chest pain, cough, dyspnea, fever, chills.   Per son Toy Byers, patient has been confused about her medications and how to take them, but he feels that this is less true confusion and more her being overwhelmed by her new treatment regimen for Waldenstrom macroglobulinemia. He also reports she has had some insomnia since starting this regimen, which includes steroids. He believes she is taking sleeping pills occasionally, but does not know which ones. Also states patient recently had constipation alleviated by miralax. Patient lives with her , and is independent in ADLs and IADLs. ED: NS 125cc/hr, vanc, cefepime (25 Feb 2021 23:14)    2/26: Patient is sitting in chair eating, tolerating po intake well. Denies any complaints.   2/27 - no cp palps sob; pleasant cooperative, knows name location, conversant     PHYSICAL EXAM:  Vital Signs Last 24 Hrs  T(C): 36.7 (27 Feb 2021 15:14), Max: 36.7 (27 Feb 2021 09:17)  T(F): 98.1 (27 Feb 2021 15:14), Max: 98.1 (27 Feb 2021 09:17)  HR: 70 (27 Feb 2021 15:14) (70 - 75)  BP: 148/76 (27 Feb 2021 15:14) (148/60 - 155/74)  RR: 18 (27 Feb 2021 15:14) (16 - 18)  SpO2: 99% (27 Feb 2021 15:14) (93% - 99%)  Constitutional: NAD, awake and alert, well-developed  HEENT: PERRL, EOMI,   Neck: Soft and supple, No LAD, No JVD  Respiratory: Breath sounds are clear bilaterally, No wheezing, rales or rhonchi  Cardiovascular: S1 and S2, regular rate and rhythm, no Murmurs, gallops or rubs  Gastrointestinal: Bowel Sounds present, soft, nontender, nondistended, no guarding, no rebound  Extremities: No peripheral edema  Vascular: 2+ peripheral pulses  Neurological: A/O x 3, no focal deficits  Musculoskeletal: 5/5 strength b/l upper and lower extremities  Skin: No rashes      RADIOLOGY:  Xray Chest 1 View- PORTABLE-Routine (Xray Chest 1 View- PORTABLE-Routine .) (02.25.21 @ 14:00) >  AP radiograph of the chest demonstrates unchanged scattered focal infiltrates in the BILATERAL mid lungs and upper and lower lobes. The cardiac silhouette is normal in size. Osseous structures are intact.  Impression: unchanged scattered focal infiltrates in the BILATERAL mid lungs and upper and lower lobes.    CT Head No Cont (02.25.21 @ 14:33) >  IMPRESSION: No acute intracranial hemorrhage or mass effect.    LABS: All Labs Reviewed:                        8.8    10.57 )-----------( 350      ( 27 Feb 2021 06:40 )             25.5  123<L>  |  88<L>  |  SEE NOTE  ----------------------------<  105<H>  3.7   |  26  |  0.53  Ca    8.8      27 Feb 2021 11:39  Mg     2.0     02-25  TPro  7.7  /  Alb  2.3<L>  /  TBili  0.7  /  DBili  x   /  AST  12<L>  /  ALT  23  /  AlkPhos  62  02-27    MEDS:  acetaminophen   Tablet .. 650 milliGRAM(s) Oral every 6 hours PRN  acyclovir   Oral Tab/Cap 400 milliGRAM(s) Oral two times a day  ALBUTerol    90 MICROgram(s) HFA Inhaler 1 Puff(s) Inhalation every 4 hours PRN  atorvastatin 20 milliGRAM(s) Oral at bedtime  benzonatate 100 milliGRAM(s) Oral every 8 hours PRN  cefepime  Injectable. 1000 milliGRAM(s) IV Push every 12 hours  diltiazem    milliGRAM(s) Oral daily  enoxaparin Injectable 40 milliGRAM(s) SubCutaneous daily  levothyroxine 50 MICROGram(s) Oral daily  losartan 100 milliGRAM(s) Oral at bedtime  nebivolol 5 milliGRAM(s) Oral daily  sodium chloride 1.5%. 500 milliLiter(s) IV Continuous <Continuous>

## 2021-02-27 NOTE — PROGRESS NOTE ADULT - SUBJECTIVE AND OBJECTIVE BOX
Patient is a 85y Female whom presented to the hospital with Waldenstrom macroglobulinemia (NHL), HTN, HLD, CAD, hypothyroidism, vision loss, and [place] (with hearing aid) admitted on 2/25 for evaluation of hyponatremia. Patient is fatigued and has mild cough prior to admit, noted with pna and started on abx. PAST MEDICAL & SURGICAL HISTORY:  Hypothyroidism    CAD (coronary artery disease)    HLD (hyperlipidemia)    Waldenstrom&#x27;s disease    Hypertension    S/P hysterectomy        MEDICATIONS  (STANDING):  acyclovir   Oral Tab/Cap 400 milliGRAM(s) Oral two times a day  atorvastatin 20 milliGRAM(s) Oral at bedtime  cefepime  Injectable. 1000 milliGRAM(s) IV Push every 12 hours  diltiazem    milliGRAM(s) Oral daily  enoxaparin Injectable 40 milliGRAM(s) SubCutaneous daily  levothyroxine 50 MICROGram(s) Oral daily  losartan 100 milliGRAM(s) Oral at bedtime  nebivolol 5 milliGRAM(s) Oral daily  polyethylene glycol 3350 17 Gram(s) Oral two times a day        Allergies    No Known Allergies    Intolerances        SOCIAL HISTORY:  no etoh/cigg now    FAMILY HISTORY:  No pertinent family history in first degree relatives        REVIEW OF SYSTEMS:    CONSTITUTIONAL: stable weakness, fevers or chills  EYES/ENT: No visual changes;  No vertigo or throat pain   NECK: No pain or stiffness  RESPIRATORY: No cough, wheezing, hemoptysis; No shortness of breath  CARDIOVASCULAR: No chest pain or palpitations  GASTROINTESTINAL: No abdominal or epigastric pain. No nausea, vomiting, or hematemesis; No diarrhea or constipation. No melena or hematochezia.  GENITOURINARY: No dysuria, frequency or hematuria  NEUROLOGICAL: No numbness or weakness  SKIN: No itching, burning, rashes, or lesions   All other review of systems is negative unless indicated above.    Vital Signs Last 24 Hrs  T(C): 36.6 (27 Feb 2021 21:00), Max: 36.7 (27 Feb 2021 09:17)  T(F): 97.9 (27 Feb 2021 21:00), Max: 98.1 (27 Feb 2021 09:17)  HR: 76 (27 Feb 2021 21:00) (70 - 76)  BP: 119/55 (27 Feb 2021 21:00) (119/55 - 148/76)  BP(mean): --  RR: 17 (27 Feb 2021 21:00) (16 - 18)  SpO2: 96% (27 Feb 2021 21:00) (96% - 99%):12)      I&O's Detail    PHYSICAL EXAM:    Constitutional: NAD, frail  HEENT: PERRLA, EOMI,  MMM  Neck: No LAD, No JVD  Respiratory: dist  Cardiovascular: S1 and S2  Extremities: No peripheral edema  Neurological: A/O x 3, no focal deficits  : No Saucedo  Skin: No rashes  Access: Not applicable        LABS:               x      |  x      |  20     ----------------------------<  x         27 Feb 2021 16:43  x       |  x      |  x        123    |  88     |  SEE NOTE  ----------------------------<  105       27 Feb 2021 11:39  3.7     |  26     |  0.53     121    |  89     |  20     ----------------------------<  89        27 Feb 2021 06:40  3.7     |  27     |  0.58     Ca    8.8        27 Feb 2021 11:39  Ca    8.4        27 Feb 2021 06:40      Mg     2.0       25 Feb 2021 21:59    TPro  7.7    /  Alb  2.3    /  TBili  0.7    /        27 Feb 2021 06:40  DBili  x      /  AST  12     /  ALT  23     /  AlkPhos  62       TPro  8.9    /  Alb  2.7    /  TBili  0.5    /        25 Feb 2021 13:10  DBili  x      /  AST  13     /  ALT  28     /  AlkPhos  76           Urine Studies:    Osmolality, Random Urine: 632 mosm/Kg (02-25 @ 22:11)    o    RADIOLOGY & ADDITIONAL STUDIES:

## 2021-02-27 NOTE — PROGRESS NOTE ADULT - ASSESSMENT
85F with Waldenstrom macroglobulinemia, HTN, HLD, CAD, hypothyroidism, vision loss, and [place] presents for hyponatremia. Asymptomatic save for fatigue and possible mild confusion.    #Metabolic encephalopathy secondary to hyponatremia  -Mild confusion likely secondary to hyponatremia. CT head demonstrated no acute findings  -Acute on chronic hyponatremia. There is likely a dietary component as patient intentionally reduced salt intake. 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 per discussion with renal - may add NaCL tabs if needed.    #Acute hypoxia, Secondary to pneumonia, cleopatraley gram negative phillip bacterial pneumonia, present on admission   -Requiring 2L nasal cannula; desaturated to 88% on room air  -Unclear if patient has true pneumonia. Scattered infiltrates on CXR reported to be chronic. Patient has no fever, chills, cough, or dyspnea, though does have hypoxia and hyponatremia. -Will continue vanco/cefepime and monitor  2/27 - cont IV Cefepime, added tessalon  - - rec OOB to chair and PT consult and IS   - will need to check Pulse Ox for home O2 eval in 1-2 days     #Chronic anemia  -At b/l. Secondary to lymphoma    #Protein calorie malnutrition  -[place] diet  -Nutrition consult pending     Continue home medications for chronic conditions  #Waldenstrom macroglobulinemia   - Acyclovir 400mg BID for PPx. Due for chemotherapy on Wednesday; anticipate d/c prior to next session  - Patient sees Dr. Caldera who sent patient in for evaluation; Pending consult.     #HTN   - Diltiazem XR 240mg daily, HCTZ 12.5mg daily, losartan 100mg qhs, nebivolol 5mg qhs    #HLD/CAD  - Atorvastatin 20mg qhs    #Hypothyroidism   - Synthroid 50mcg daily    #Advance care planning  -HCP: Mary Kay Mckeon (daughter) [phone] (per verbal patient request; patient has not completed a HCP form)  -Patient prefers FULL CODE, including resuscitation and intubation as indicated    #DVT ppx  -Lovenox 40mg subcu daily  IMPROVE VTE Individual Risk Assessment 3 for age and current CA     DISPO: D/C once hem/onc consult obtained, ID cleared, and hyponatremia resolves. Patient should be moved to non-covid floor given negative covid test/ PUI.     2/27 discussed with  Mary Kay Okeefe [phone]

## 2021-02-27 NOTE — PROGRESS NOTE ADULT - ASSESSMENT
85F with past medical history of hypertension, hyperlipidemia, CAD, hypothyroidism, [place] Erika macroglobulinemia (bone marrow biopsy 12/15/2020 showing extensive low-grade B-cell lymphoproliferative infiltrate; IgM 5g), treated in ambulatory with weekly dexamethasone/bortezomib ( last dose 2/17/21) referred to the emergency room with hyponatremia (sodium 119), fatigue, mild cough, and reported episodes of confusion.  CXR showed bilateral infiltrates .   Treated with antibiotics for pneumonia. No evidence of Covid infection.  Evaluated by  nephrology . Hyponatremia in setting of  pneumonia and HCTZ treatment ( discontinued) On 1.5 % sodium chloride per nephrology.   S/p two cycles of Velcade/ Dex - IgM improving.

## 2021-02-27 NOTE — PROGRESS NOTE ADULT - ASSESSMENT
85 with Waldenstrom macroglobulinemia (NHL), HTN, HLD, CAD, hypothyroidism, vision loss, and [place] (with hearing aid) admitted on 2/25 for evaluation of hyponatremia. Suspect hyponatremia due to PNA, other consideration is psuedohyponatremia due to hyperproteinemia but less likely with stable Na on prior admits (when serum protein was higher). HCTZ likely playing a role as well    Hyponatremia - asymptomtatic    in setting of HCTZ use + PNA related SIADH   Na stablizing after 1.5% saline.   fluid restriction    if Na drops then resume 1.5% saline       PNA  -IV abx med    HTN  -ARB ok, no further hctZ        d/w Dr Mishra

## 2021-02-27 NOTE — PROGRESS NOTE ADULT - SUBJECTIVE AND OBJECTIVE BOX
HPI:  85F with past medical history of hypertension, hyperlipidemia, CAD, hypothyroidism, [place] Erika macroglobulinemia (bone marrow biopsy 12/15/2020 showing extensive low-grade B-cell lymphoproliferative infiltrate; IgM 5g), treated in ambulatory with weekly dexamethasone/bortezomib ( last dose 2/17/21) referred to the emergency room with hyponatremia (sodium 119), fatigue, mild cough, and reported episodes of confusion.  Patient was scheduled to start treatment with rituximab in ambulatory, after she refused cyclophosphamide.  This morning she feels a lot better, her sodium at 123. CT head showed no acute intracranial hemorrhage or mass effect. Started on empiric Cefepime ; tested negative for covid in ambulatory. ( 2/26/21)     2/27/21  Feels better. Denies respiratory complaints.     PAST MEDICAL & SURGICAL HISTORY:  Hypothyroidism  CAD (coronary artery disease)  HLD (hyperlipidemia)  Waldenstrom&#x27;s disease  Hypertension  S/P hysterectomy    ALLERGIES:  No Known Allergies    FAMILY HISTORY:  Reviewed, non-contributory: [ x ]     SOCIAL HISTORY:  Tobacco: YES [ ]  ; NO [ x ]; Former smoker [ ]  Alcohol:   YES [ ]  ; NO [ x ]; Social alcohol user [ ]  Occupation/ marital status/ children: lives independently, ; has children    REVIEW SYSTEMS:  Constitutional: no fever, chills, night sweats, no weight loss  HEENT: denies visual changes; no oral ulcers, dysphagia, no epistaxis; oses corrective lenses  Respiratory: no dyspnea , mild cough, no hemoptysis  Cardiovascular: denies acute chest pain, palpitations  GI: no loss of appetite, dark stools, or abdominal tenderness / pain; no change in bowel habits.  Musculoskeletal: no new back pain, bone/ joint pain ,no extremity swelling  Integumentary: denies pruritus; no skin rash, bruises, no  suspicious skin lesions  Neurologic: denies peripheral numbness, occasionally confused  Heme: no reported easy bruisability; no lymph node enlargement    MEDICATIONS  (STANDING):  acyclovir   Oral Tab/Cap 400 milliGRAM(s) Oral two times a day  atorvastatin 20 milliGRAM(s) Oral at bedtime  cefepime  Injectable. 1000 milliGRAM(s) IV Push every 12 hours  diltiazem    milliGRAM(s) Oral daily  enoxaparin Injectable 40 milliGRAM(s) SubCutaneous daily  levothyroxine 50 MICROGram(s) Oral daily  losartan 100 milliGRAM(s) Oral at bedtime  nebivolol 5 milliGRAM(s) Oral daily  sodium chloride 1.5%. 500 milliLiter(s) (35 mL/Hr) IV Continuous <Continuous>    MEDICATIONS  (PRN):  acetaminophen   Tablet .. 650 milliGRAM(s) Oral every 6 hours PRN Mild Pain (1 - 3)  ALBUTerol    90 MICROgram(s) HFA Inhaler 1 Puff(s) Inhalation every 4 hours PRN Shortness of Breath and/or Wheezing  benzonatate 100 milliGRAM(s) Oral every 8 hours PRN Cough      Vital Signs Last 24 Hrs  T(C): 36.7 (27 Feb 2021 09:17), Max: 36.7 (26 Feb 2021 15:15)  T(F): 98.1 (27 Feb 2021 09:17), Max: 98.1 (26 Feb 2021 15:15)  HR: 75 (27 Feb 2021 09:17) (73 - 75)  BP: 148/60 (27 Feb 2021 09:17) (148/60 - 155/74)  BP(mean): --  RR: 16 (27 Feb 2021 09:17) (16 - 18)  SpO2: 98% (27 Feb 2021 09:17) (93% - 98%)    PHYSICAL EXAM:  Elderly lady out of bed in chair. Looks well.  HEENT: normocephalic, anicteric sclerae, no mucositis or thrush  Respiratory: bilateral clear to auscultation anteriorly  Cardiovascular : S1, S2 regular, rhythmic, no murmurs, gallops or rubs  Abdomen: soft, not distended, + normoactive BS, no palpable HS- megaly  Extremities: no tenderness;  -c/c/e, pulses equal bilaterally  Integumentary: no rashes, scars, or lesions suggestive of malignancy  Neurologic: no gross focal deficits    LABS:  (02-25) WBC: 10.25 K/uL,Hemoglobin: 9.7 g/dL, Hematocrit: 28.3 %,  Platelet: 329 K/uL  (02-26) Na: 123 mmol/L ; K: 3.9 mmol/L ; BUN: 17 mg/dL ; Cr: 0.56 mg/dL.                          8.8    10.57 )-----------( 350      ( 27 Feb 2021 06:40 )             25.5   02-27    121<L>  |  89<L>  |  20  ----------------------------<  89  3.7   |  27  |  0.58    Ca    8.4<L>      27 Feb 2021 06:40  Mg     2.0     02-25    TPro  7.7  /  Alb  2.3<L>  /  TBili  0.7  /  DBili  x   /  AST  12<L>  /  ALT  23  /  AlkPhos  62  02-27    CXR  bilateral infiltrates

## 2021-02-27 NOTE — PHYSICAL THERAPY INITIAL EVALUATION ADULT - PERTINENT HX OF CURRENT PROBLEM, REHAB EVAL
referred to the emergency room with hyponatremia (sodium 119), fatigue, mild cough, and reported episodes of confusion. found to be hyponatremic, improved

## 2021-02-28 LAB
ALBUMIN SERPL ELPH-MCNC: 2.4 G/DL — LOW (ref 3.3–5)
ANION GAP SERPL CALC-SCNC: 7 MMOL/L — SIGNIFICANT CHANGE UP (ref 5–17)
ANION GAP SERPL CALC-SCNC: 7 MMOL/L — SIGNIFICANT CHANGE UP (ref 5–17)
BUN SERPL-MCNC: 17 MG/DL — SIGNIFICANT CHANGE UP (ref 7–23)
BUN SERPL-MCNC: 20 MG/DL — SIGNIFICANT CHANGE UP (ref 7–23)
CALCIUM SERPL-MCNC: 8.4 MG/DL — LOW (ref 8.5–10.1)
CALCIUM SERPL-MCNC: 8.6 MG/DL — SIGNIFICANT CHANGE UP (ref 8.5–10.1)
CHLORIDE SERPL-SCNC: 89 MMOL/L — LOW (ref 96–108)
CHLORIDE SERPL-SCNC: 89 MMOL/L — LOW (ref 96–108)
CO2 SERPL-SCNC: 25 MMOL/L — SIGNIFICANT CHANGE UP (ref 22–31)
CO2 SERPL-SCNC: 27 MMOL/L — SIGNIFICANT CHANGE UP (ref 22–31)
CREAT SERPL-MCNC: 0.42 MG/DL — LOW (ref 0.5–1.3)
CREAT SERPL-MCNC: 0.45 MG/DL — LOW (ref 0.5–1.3)
GLUCOSE SERPL-MCNC: 108 MG/DL — HIGH (ref 70–99)
GLUCOSE SERPL-MCNC: 119 MG/DL — HIGH (ref 70–99)
HCT VFR BLD CALC: 25.1 % — LOW (ref 34.5–45)
HGB BLD-MCNC: 8.7 G/DL — LOW (ref 11.5–15.5)
MAGNESIUM SERPL-MCNC: 1.9 MG/DL — SIGNIFICANT CHANGE UP (ref 1.6–2.6)
MCHC RBC-ENTMCNC: 30.6 PG — SIGNIFICANT CHANGE UP (ref 27–34)
MCHC RBC-ENTMCNC: 34.7 GM/DL — SIGNIFICANT CHANGE UP (ref 32–36)
MCV RBC AUTO: 88.4 FL — SIGNIFICANT CHANGE UP (ref 80–100)
PHOSPHATE SERPL-MCNC: 1.3 MG/DL — LOW (ref 2.5–4.5)
PLATELET # BLD AUTO: 304 K/UL — SIGNIFICANT CHANGE UP (ref 150–400)
POTASSIUM SERPL-MCNC: 3.4 MMOL/L — LOW (ref 3.5–5.3)
POTASSIUM SERPL-MCNC: 3.5 MMOL/L — SIGNIFICANT CHANGE UP (ref 3.5–5.3)
POTASSIUM SERPL-SCNC: 3.4 MMOL/L — LOW (ref 3.5–5.3)
POTASSIUM SERPL-SCNC: 3.5 MMOL/L — SIGNIFICANT CHANGE UP (ref 3.5–5.3)
RBC # BLD: 2.84 M/UL — LOW (ref 3.8–5.2)
RBC # FLD: 16.3 % — HIGH (ref 10.3–14.5)
SODIUM SERPL-SCNC: 121 MMOL/L — LOW (ref 135–145)
SODIUM SERPL-SCNC: 123 MMOL/L — LOW (ref 135–145)
WBC # BLD: 7.9 K/UL — SIGNIFICANT CHANGE UP (ref 3.8–10.5)
WBC # FLD AUTO: 7.9 K/UL — SIGNIFICANT CHANGE UP (ref 3.8–10.5)

## 2021-02-28 PROCEDURE — 99232 SBSQ HOSP IP/OBS MODERATE 35: CPT

## 2021-02-28 RX ORDER — SENNA PLUS 8.6 MG/1
2 TABLET ORAL ONCE
Refills: 0 | Status: COMPLETED | OUTPATIENT
Start: 2021-02-28 | End: 2021-02-28

## 2021-02-28 RX ORDER — LANOLIN ALCOHOL/MO/W.PET/CERES
5 CREAM (GRAM) TOPICAL ONCE
Refills: 0 | Status: COMPLETED | OUTPATIENT
Start: 2021-02-28 | End: 2021-02-28

## 2021-02-28 RX ORDER — MINERAL OIL
133 OIL (ML) MISCELLANEOUS ONCE
Refills: 0 | Status: COMPLETED | OUTPATIENT
Start: 2021-02-28 | End: 2021-02-28

## 2021-02-28 RX ORDER — SODIUM CHLORIDE 9 MG/ML
1 INJECTION INTRAMUSCULAR; INTRAVENOUS; SUBCUTANEOUS
Refills: 0 | Status: COMPLETED | OUTPATIENT
Start: 2021-02-28 | End: 2021-02-28

## 2021-02-28 RX ADMIN — ATORVASTATIN CALCIUM 20 MILLIGRAM(S): 80 TABLET, FILM COATED ORAL at 22:04

## 2021-02-28 RX ADMIN — CEFEPIME 1000 MILLIGRAM(S): 1 INJECTION, POWDER, FOR SOLUTION INTRAMUSCULAR; INTRAVENOUS at 22:05

## 2021-02-28 RX ADMIN — Medication 5 MILLIGRAM(S): at 00:53

## 2021-02-28 RX ADMIN — Medication 400 MILLIGRAM(S): at 08:47

## 2021-02-28 RX ADMIN — NEBIVOLOL HYDROCHLORIDE 5 MILLIGRAM(S): 5 TABLET ORAL at 08:48

## 2021-02-28 RX ADMIN — POLYETHYLENE GLYCOL 3350 17 GRAM(S): 17 POWDER, FOR SOLUTION ORAL at 08:48

## 2021-02-28 RX ADMIN — LOSARTAN POTASSIUM 100 MILLIGRAM(S): 100 TABLET, FILM COATED ORAL at 22:05

## 2021-02-28 RX ADMIN — POLYETHYLENE GLYCOL 3350 17 GRAM(S): 17 POWDER, FOR SOLUTION ORAL at 22:05

## 2021-02-28 RX ADMIN — CEFEPIME 1000 MILLIGRAM(S): 1 INJECTION, POWDER, FOR SOLUTION INTRAMUSCULAR; INTRAVENOUS at 08:47

## 2021-02-28 RX ADMIN — SODIUM CHLORIDE 1 GRAM(S): 9 INJECTION INTRAMUSCULAR; INTRAVENOUS; SUBCUTANEOUS at 15:42

## 2021-02-28 RX ADMIN — Medication 133 MILLILITER(S): at 03:50

## 2021-02-28 RX ADMIN — Medication 240 MILLIGRAM(S): at 08:48

## 2021-02-28 RX ADMIN — SODIUM CHLORIDE 1 GRAM(S): 9 INJECTION INTRAMUSCULAR; INTRAVENOUS; SUBCUTANEOUS at 22:04

## 2021-02-28 RX ADMIN — ENOXAPARIN SODIUM 40 MILLIGRAM(S): 100 INJECTION SUBCUTANEOUS at 08:48

## 2021-02-28 RX ADMIN — SENNA PLUS 2 TABLET(S): 8.6 TABLET ORAL at 05:03

## 2021-02-28 RX ADMIN — Medication 400 MILLIGRAM(S): at 22:04

## 2021-02-28 RX ADMIN — Medication 50 MICROGRAM(S): at 04:36

## 2021-02-28 NOTE — PROGRESS NOTE ADULT - ASSESSMENT
85 with Waldenstrom macroglobulinemia (NHL), HTN, HLD, CAD, hypothyroidism, vision loss, and [place] (with hearing aid) admitted on 2/25 for evaluation of hyponatremia. Suspect hyponatremia due to PNA, other consideration is psuedohyponatremia due to hyperproteinemia but less likely with stable Na on prior admits (when serum protein was higher). HCTZ likely playing a role as well    Hyponatremia - asymptomtatic    in setting of HCTZ use + PNA related SIADH   Na remains at 123- give NaCl tabs today - repeat labs tonight    fluid restriction    if Na drops then resume 1.5% saline   if Na ~ 129 - 130 perhaps can be DC tomorrow   (pt has second vaccine dose scheduled for Tuesday )       PNA  -IV abx med    HTN  -ARB ok, no further thiazides     d.w Dr Mishra        d/w Dr Mishra

## 2021-02-28 NOTE — PROGRESS NOTE ADULT - SUBJECTIVE AND OBJECTIVE BOX
Patient is a 85y Female whom presented to the hospital with Waldenstrom macroglobulinemia (NHL), HTN, HLD, CAD, hypothyroidism, vision loss, and [place] (with hearing aid) admitted on 2/25 for evaluation of hyponatremia. Patient is fatigued and has mild cough prior to admit, noted with pna and started on abx.     coughing    no sob    no n, or v       PAST MEDICAL & SURGICAL HISTORY:  Hypothyroidism    CAD (coronary artery disease)    HLD (hyperlipidemia)    Waldenstrom&#x27;s disease    Hypertension    S/P hysterectomy      MEDICATIONS  (STANDING):  acyclovir   Oral Tab/Cap 400 milliGRAM(s) Oral two times a day  atorvastatin 20 milliGRAM(s) Oral at bedtime  cefepime  Injectable. 1000 milliGRAM(s) IV Push every 12 hours  diltiazem    milliGRAM(s) Oral daily  enoxaparin Injectable 40 milliGRAM(s) SubCutaneous daily  levothyroxine 50 MICROGram(s) Oral daily  losartan 100 milliGRAM(s) Oral at bedtime  nebivolol 5 milliGRAM(s) Oral daily  polyethylene glycol 3350 17 Gram(s) Oral two times a day  sodium chloride 1 Gram(s) Oral two times a day  sodium chloride 1.5%. 500 milliLiter(s) (35 mL/Hr) IV Continuous <Continuous>    MEDICATIONS  (PRN):  acetaminophen   Tablet .. 650 milliGRAM(s) Oral every 6 hours PRN Mild Pain (1 - 3)  ALBUTerol    90 MICROgram(s) HFA Inhaler 1 Puff(s) Inhalation every 4 hours PRN Shortness of Breath and/or Wheezing  benzonatate 100 milliGRAM(s) Oral every 8 hours PRN Cough        Allergies    No Known Allergies    Intolerances        SOCIAL HISTORY:  no etoh/cigg now    FAMILY HISTORY:  No pertinent family history in first degree relatives        REVIEW OF SYSTEMS:    CONSTITUTIONAL: stable weakness, fevers or chills  EYES/ENT: No visual changes;  No vertigo or throat pain   NECK: No pain or stiffness  RESPIRATORY: No cough, wheezing, hemoptysis; No shortness of breath  CARDIOVASCULAR: No chest pain or palpitations  GASTROINTESTINAL: No abdominal or epigastric pain. No nausea, vomiting, or hematemesis; No diarrhea or constipation. No melena or hematochezia.  GENITOURINARY: No dysuria, frequency or hematuria  NEUROLOGICAL: No numbness or weakness  SKIN: No itching, burning, rashes, or lesions   All other review of systems is negative unless indicated above.    Vital Signs Last 24 Hrs  T(C): 36.7 (28 Feb 2021 15:22), Max: 36.9 (28 Feb 2021 08:45)  T(F): 98 (28 Feb 2021 15:22), Max: 98.4 (28 Feb 2021 08:45)  HR: 66 (28 Feb 2021 15:22) (66 - 76)  BP: 141/54 (28 Feb 2021 15:22) (119/55 - 157/62)  BP(mean): --  RR: 18 (28 Feb 2021 15:22) (17 - 18)  SpO2: 98% (28 Feb 2021 15:22) (92% - 98%)      I&O's Detail    PHYSICAL EXAM:    Constitutional: NAD, frail  HEENT: PERRLA, EOMI,  MMM  Neck: No LAD, No JVD  Respiratory: dist  Cardiovascular: S1 and S2  Extremities: No peripheral edema  Neurological: A/O x 3, no focal deficits  : No Saucedo  Skin: No rashes  Access: Not applicable        LABS:               123    |  89     |  20     ----------------------------<  108       28 Feb 2021 06:43  3.5     |  27     |  0.42     x      |  x      |  20     ----------------------------<  x         27 Feb 2021 16:43  x       |  x      |  x        123    |  88     |  SEE NOTE  ----------------------------<  105       27 Feb 2021 11:39  3.7     |  26     |  0.53     Ca    8.6        28 Feb 2021 06:43  Ca    8.8        27 Feb 2021 11:39      Mg     1.9       28 Feb 2021 06:43  Mg     2.0       25 Feb 2021 21:59    TPro  7.7    /  Alb  2.3    /  TBili  0.7    /        27 Feb 2021 06:40  DBili  x      /  AST  12     /  ALT  23     /  AlkPhos  62       TPro  8.9    /  Alb  2.7    /  TBili  0.5    /        25 Feb 2021 13:10  DBili  x      /  AST  13     /  ALT  28     /  AlkPhos  76           Urine Studies:    Osmolality, Random Urine: 632 mosm/Kg (02-25 @ 22:11)    o    RADIOLOGY & ADDITIONAL STUDIES:

## 2021-02-28 NOTE — PROGRESS NOTE ADULT - ASSESSMENT
85F with Waldenstrom macroglobulinemia, HTN, HLD, CAD, hypothyroidism, vision loss, and [place] presents for hyponatremia. Asymptomatic save for fatigue and possible mild confusion.    #Metabolic encephalopathy secondary to hyponatremia  -Mild confusion likely secondary to hyponatremia. CT head demonstrated no acute findings  -Acute on chronic hyponatremia. There is likely a dietary component as patient intentionally reduced salt intake. 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 per discussion with renal - may add NaCL tabs if needed.  2/28 - discussed with renal - plan to add nacl tabs today as Na not much improved     #Acute hypoxia, Secondary to pneumonia, cleopatraley gram negative phillip bacterial pneumonia, present on admission   -Requiring 2L nasal cannula; desaturated to 88% on room air  -Unclear if patient has true pneumonia. Scattered infiltrates on CXR reported to be chronic. Patient has no fever, chills, cough, or dyspnea, though does have hypoxia and hyponatremia. -Will continue vanco/cefepime and monitor  2/27 - cont IV Cefepime, added tessalon  - - rec OOB to chair and PT consult and IS   - will need to check Pulse Ox for home O2 eval in 1-2 days   2/28 - doing better, cont IV cefepime today and will switch to augmentin or ceftin tomorrow     #Chronic anemia  -At b/l. Secondary to lymphoma    #Protein calorie malnutrition  -[place] diet  -Nutrition consult pending     Continue home medications for chronic conditions  #Waldenstrom macroglobulinemia   - Acyclovir 400mg BID for PPx. Due for chemotherapy on Wednesday; anticipate d/c prior to next session  - Patient sees Dr. Locke who sent patient in for evaluation    #HTN   - Diltiazem XR 240mg daily, HCTZ 12.5mg daily, losartan 100mg qhs, nebivolol 5mg qhs    #HLD/CAD  - Atorvastatin 20mg qhs    #Hypothyroidism   - Synthroid 50mcg daily    #Advance care planning  -HCP: Mary Kay Mckeon (daughter) [phone] (per verbal patient request; patient has not completed a HCP form)  -Patient prefers FULL CODE, including resuscitation and intubation as indicated    #DVT ppx  -Lovenox 40mg subcu daily  IMPROVE VTE Individual Risk Assessment 3 for age and current CA         2/27 discussed with  Mary Kay Okeefe [phone]  2/28 - plan to dc home tomorrow, would not resume any chmeo etc until abx course completed; home O2 eval in AM   discussed with team at [facility]

## 2021-02-28 NOTE — PROGRESS NOTE ADULT - ASSESSMENT
85F with past medical history of hypertension, hyperlipidemia, CAD, hypothyroidism, [place] Erika macroglobulinemia (bone marrow biopsy 12/15/2020 showing extensive low-grade B-cell lymphoproliferative infiltrate; IgM 5g), treated in ambulatory with weekly dexamethasone/bortezomib ( last dose 2/17/21) referred to the emergency room with hyponatremia (sodium 119), fatigue, mild cough, and reported episodes of confusion.  CXR showed bilateral infiltrates .   Treated with antibiotics for pneumonia. No evidence of Covid infection.  Evaluated by  nephrology . Hyponatremia in setting of  pneumonia and HCTZ treatment ( discontinued) s/p  1.5 % sodium chloride  iv. Sodium is slowly improving.  S/p two cycles of Velcade/ Dex - IgM improving.     If sodium continues to improve- probably can be discharged home with outpatient follow up.

## 2021-02-28 NOTE — PROGRESS NOTE ADULT - SUBJECTIVE AND OBJECTIVE BOX
HPI:  85F with past medical history of hypertension, hyperlipidemia, CAD, hypothyroidism, [place] Erika macroglobulinemia (bone marrow biopsy 12/15/2020 showing extensive low-grade B-cell lymphoproliferative infiltrate; IgM 5g), treated in ambulatory with weekly dexamethasone/bortezomib ( last dose 2/17/21) referred to the emergency room with hyponatremia (sodium 119), fatigue, mild cough, and reported episodes of confusion.  Patient was scheduled to start treatment with rituximab in ambulatory, after she refused cyclophosphamide.  This morning she feels a lot better, her sodium at 123. CT head showed no acute intracranial hemorrhage or mass effect. Started on empiric Cefepime ; tested negative for covid in ambulatory. ( 2/26/21)     2/27/21  Feels better. Denies respiratory complaints.   2/18/21  C/o constipation. Did not sleep last night because of that. Had small BM today AM. No resp c/o. Hoping to go home tomorrow.     PAST MEDICAL & SURGICAL HISTORY:  Hypothyroidism  CAD (coronary artery disease)  HLD (hyperlipidemia)  Waldenstrom&#x27;s disease  Hypertension  S/P hysterectomy    ALLERGIES:  No Known Allergies    FAMILY HISTORY:  Reviewed, non-contributory: [ x ]     SOCIAL HISTORY:  Tobacco: YES [ ]  ; NO [ x ]; Former smoker [ ]  Alcohol:   YES [ ]  ; NO [ x ]; Social alcohol user [ ]  Occupation/ marital status/ children: lives independently, ; has children    REVIEW SYSTEMS:  Constitutional: no fever, chills, night sweats, no weight loss  HEENT: denies visual changes; no oral ulcers, dysphagia, no epistaxis; oses corrective lenses  Respiratory: no dyspnea , mild cough, no hemoptysis  Cardiovascular: denies acute chest pain, palpitations  GI: as above   Musculoskeletal: no new back pain, bone/ joint pain ,no extremity swelling  Integumentary: denies pruritus; no skin rash, bruises, no  suspicious skin lesions  Neurologic: denies peripheral numbness, occasionally confused  Heme: no reported easy bruisability; no lymph node enlargement    MEDICATIONS  (STANDING):  acyclovir   Oral Tab/Cap 400 milliGRAM(s) Oral two times a day  atorvastatin 20 milliGRAM(s) Oral at bedtime  cefepime  Injectable. 1000 milliGRAM(s) IV Push every 12 hours  diltiazem    milliGRAM(s) Oral daily  enoxaparin Injectable 40 milliGRAM(s) SubCutaneous daily  levothyroxine 50 MICROGram(s) Oral daily  losartan 100 milliGRAM(s) Oral at bedtime  nebivolol 5 milliGRAM(s) Oral daily  polyethylene glycol 3350 17 Gram(s) Oral two times a day  sodium chloride 1.5%. 500 milliLiter(s) (35 mL/Hr) IV Continuous <Continuous>    MEDICATIONS  (PRN):  acetaminophen   Tablet .. 650 milliGRAM(s) Oral every 6 hours PRN Mild Pain (1 - 3)  ALBUTerol    90 MICROgram(s) HFA Inhaler 1 Puff(s) Inhalation every 4 hours PRN Shortness of Breath and/or Wheezing  benzonatate 100 milliGRAM(s) Oral every 8 hours PRN Cough    Vital Signs Last 24 Hrs  T(C): 36.9 (28 Feb 2021 08:45), Max: 36.9 (28 Feb 2021 08:45)  T(F): 98.4 (28 Feb 2021 08:45), Max: 98.4 (28 Feb 2021 08:45)  HR: 72 (28 Feb 2021 08:45) (70 - 76)  BP: 157/62 (28 Feb 2021 08:45) (119/55 - 157/62)  BP(mean): --  RR: 18 (28 Feb 2021 08:46) (17 - 18)  SpO2: 98% (28 Feb 2021 08:46) (92% - 99%)    PHYSICAL EXAM:  Elderly lady out of bed in chair. Looks well.  HEENT: normocephalic, anicteric sclerae, no mucositis or thrush  Respiratory: bilateral clear to auscultation anteriorly  Cardiovascular : S1, S2 regular, rhythmic, no murmurs, gallops or rubs  Abdomen: soft, not distended, + normoactive BS, no palpable HS- megaly  Extremities: no tenderness;  -c/c/e, pulses equal bilaterally  Integumentary: no rashes, scars, or lesions suggestive of malignancy  Neurologic: no gross focal deficits    LABS:  (02-25) WBC: 10.25 K/uL,Hemoglobin: 9.7 g/dL, Hematocrit: 28.3 %,  Platelet: 329 K/uL  (02-26) Na: 123 mmol/L ; K: 3.9 mmol/L ; BUN: 17 mg/dL ; Cr: 0.56 mg/dL.                          8.8    10.57 )-----------( 350      ( 27 Feb 2021 06:40 )             25.5   02-27    121<L>  |  89<L>  |  20  ----------------------------<  89  3.7   |  27  |  0.58    Ca    8.4<L>      27 Feb 2021 06:40  Mg     2.0     02-25    TPro  7.7  /  Alb  2.3<L>  /  TBili  0.7  /  DBili  x   /  AST  12<L>  /  ALT  23  /  AlkPhos  62  02-27 02-28    123<L>  |  89<L>  |  20  ----------------------------<  108<H>  3.5   |  27  |  0.42<L>    Ca    8.6      28 Feb 2021 06:43  Mg     1.9     02-28    TPro  7.7  /  Alb  2.3<L>  /  TBili  0.7  /  DBili  x   /  AST  12<L>  /  ALT  23  /  AlkPhos  62  02-27      CXR  bilateral infiltrates

## 2021-02-28 NOTE — PROGRESS NOTE ADULT - SUBJECTIVE AND OBJECTIVE BOX
85F with Waldenstrom macroglobulinemia (NHL), HTN, HLD, CAD, hypothyroidism, vision loss, and [place] (with hearing aid) sent by Dr. Locke for hyponatremia. Patient reports some fatigue, but denies other symptoms. Notes she has tried to minimize salt in her diet at the recommendation of her PCP. Denies chest pain, cough, dyspnea, fever, chills.   Per son Toy Byers, patient has been confused about her medications and how to take them, but he feels that this is less true confusion and more her being overwhelmed by her new treatment regimen for Waldenstrom macroglobulinemia. He also reports she has had some insomnia since starting this regimen, which includes steroids. He believes she is taking sleeping pills occasionally, but does not know which ones. Also states patient recently had constipation alleviated by miralax. Patient lives with her , and is independent in ADLs and IADLs. ED: NS 125cc/hr, vanc, cefepime (25 Feb 2021 23:14)    2/26: Patient is sitting in chair eating, tolerating po intake well. Denies any complaints.   2/27 - no cp palps sob; pleasant cooperative, knows name location, conversant   2/28 - cough is better, appears to be at her baseline mentally, discussed dc plan with patient     PHYSICAL EXAM:  Vital Signs Last 24 Hrs  T(C): 36.7 (28 Feb 2021 15:22), Max: 36.9 (28 Feb 2021 08:45)  T(F): 98 (28 Feb 2021 15:22), Max: 98.4 (28 Feb 2021 08:45)  HR: 66 (28 Feb 2021 15:22) (66 - 76)  BP: 141/54 (28 Feb 2021 15:22) (119/55 - 157/62)  BP(mean): --  RR: 18 (28 Feb 2021 15:22) (17 - 18)  SpO2: 98% (28 Feb 2021 15:22) (92% - 98%)  Constitutional: NAD, awake and alert, well-developed  HEENT: PERRL, EOMI,   Neck: Soft and supple, No LAD, No JVD  Respiratory: Breath sounds are clear bilaterally, No wheezing, rales or rhonchi  Cardiovascular: S1 and S2, regular rate and rhythm, no Murmurs, gallops or rubs  Gastrointestinal: Bowel Sounds present, soft, nontender, nondistended, no guarding, no rebound  Extremities: No peripheral edema  Vascular: 2+ peripheral pulses  Neurological: A/O x 3, no focal deficits  Musculoskeletal: 5/5 strength b/l upper and lower extremities  Skin: No rashes      RADIOLOGY:  Xray Chest 1 View- PORTABLE-Routine (Xray Chest 1 View- PORTABLE-Routine .) (02.25.21 @ 14:00) >  AP radiograph of the chest demonstrates unchanged scattered focal infiltrates in the BILATERAL mid lungs and upper and lower lobes. The cardiac silhouette is normal in size. Osseous structures are intact.  Impression: unchanged scattered focal infiltrates in the BILATERAL mid lungs and upper and lower lobes.    CT Head No Cont (02.25.21 @ 14:33) >  IMPRESSION: No acute intracranial hemorrhage or mass effect.    LABS: All Labs Reviewed:                        8.7    7.90  )-----------( 304      ( 28 Feb 2021 06:43 )             25.1     123<L>  |  89<L>  |  20  ----------------------------<  108<H>  3.5   |  27  |  0.42<L>    Ca    8.6      28 Feb 2021 06:43  Mg     1.9     02-28    TPro  7.7  /  Alb  2.3<L>  /  TBili  0.7  /  DBili  x   /  AST  12<L>  /  ALT  23  /  AlkPhos  62  02-27      acetaminophen   Tablet .. 650 milliGRAM(s) Oral every 6 hours PRN  acyclovir   Oral Tab/Cap 400 milliGRAM(s) Oral two times a day  ALBUTerol    90 MICROgram(s) HFA Inhaler 1 Puff(s) Inhalation every 4 hours PRN  atorvastatin 20 milliGRAM(s) Oral at bedtime  benzonatate 100 milliGRAM(s) Oral every 8 hours PRN  cefepime  Injectable. 1000 milliGRAM(s) IV Push every 12 hours  diltiazem    milliGRAM(s) Oral daily  enoxaparin Injectable 40 milliGRAM(s) SubCutaneous daily  levothyroxine 50 MICROGram(s) Oral daily  losartan 100 milliGRAM(s) Oral at bedtime  nebivolol 5 milliGRAM(s) Oral daily  polyethylene glycol 3350 17 Gram(s) Oral two times a day  sodium chloride 1 Gram(s) Oral two times a day  sodium chloride 1.5%. 500 milliLiter(s) IV Continuous <Continuous>

## 2021-02-28 NOTE — PROGRESS NOTE ADULT - NUTRITIONAL ASSESSMENT
This patient has been assessed with a concern for Malnutrition and has been determined to have a diagnosis/diagnoses of Moderate protein-calorie malnutrition.    This patient is being managed with:   Diet Regular-  Entered: Feb 25 2021  3:28PM    
This patient has been assessed with a concern for Malnutrition and has been determined to have a diagnosis/diagnoses of Moderate protein-calorie malnutrition.    This patient is being managed with:   Diet Regular-  Entered: Feb 25 2021  3:28PM

## 2021-03-01 ENCOUNTER — APPOINTMENT (OUTPATIENT)
Dept: HEMATOLOGY ONCOLOGY | Facility: CLINIC | Age: 86
End: 2021-03-01

## 2021-03-01 ENCOUNTER — TRANSCRIPTION ENCOUNTER (OUTPATIENT)
Age: 86
End: 2021-03-01

## 2021-03-01 VITALS
DIASTOLIC BLOOD PRESSURE: 46 MMHG | HEART RATE: 71 BPM | OXYGEN SATURATION: 97 % | RESPIRATION RATE: 18 BRPM | SYSTOLIC BLOOD PRESSURE: 126 MMHG | TEMPERATURE: 98 F

## 2021-03-01 LAB
ANION GAP SERPL CALC-SCNC: 6 MMOL/L — SIGNIFICANT CHANGE UP (ref 5–17)
ANION GAP SERPL CALC-SCNC: 7 MMOL/L — SIGNIFICANT CHANGE UP (ref 5–17)
BUN SERPL-MCNC: 15 MG/DL — SIGNIFICANT CHANGE UP (ref 7–23)
BUN SERPL-MCNC: 16 MG/DL — SIGNIFICANT CHANGE UP (ref 7–23)
CALCIUM SERPL-MCNC: 8.4 MG/DL — LOW (ref 8.5–10.1)
CALCIUM SERPL-MCNC: 8.5 MG/DL — SIGNIFICANT CHANGE UP (ref 8.5–10.1)
CHLORIDE SERPL-SCNC: 87 MMOL/L — LOW (ref 96–108)
CHLORIDE SERPL-SCNC: 91 MMOL/L — LOW (ref 96–108)
CO2 SERPL-SCNC: 26 MMOL/L — SIGNIFICANT CHANGE UP (ref 22–31)
CO2 SERPL-SCNC: 29 MMOL/L — SIGNIFICANT CHANGE UP (ref 22–31)
CREAT SERPL-MCNC: 0.37 MG/DL — LOW (ref 0.5–1.3)
CREAT SERPL-MCNC: 0.68 MG/DL — SIGNIFICANT CHANGE UP (ref 0.5–1.3)
GLUCOSE SERPL-MCNC: 106 MG/DL — HIGH (ref 70–99)
GLUCOSE SERPL-MCNC: 144 MG/DL — HIGH (ref 70–99)
POTASSIUM SERPL-MCNC: 3.5 MMOL/L — SIGNIFICANT CHANGE UP (ref 3.5–5.3)
POTASSIUM SERPL-MCNC: 3.5 MMOL/L — SIGNIFICANT CHANGE UP (ref 3.5–5.3)
POTASSIUM SERPL-SCNC: 3.5 MMOL/L — SIGNIFICANT CHANGE UP (ref 3.5–5.3)
POTASSIUM SERPL-SCNC: 3.5 MMOL/L — SIGNIFICANT CHANGE UP (ref 3.5–5.3)
SODIUM SERPL-SCNC: 122 MMOL/L — LOW (ref 135–145)
SODIUM SERPL-SCNC: 124 MMOL/L — LOW (ref 135–145)

## 2021-03-01 PROCEDURE — 99231 SBSQ HOSP IP/OBS SF/LOW 25: CPT

## 2021-03-01 PROCEDURE — 99239 HOSP IP/OBS DSCHRG MGMT >30: CPT

## 2021-03-01 RX ORDER — NITROFURANTOIN (MONOHYDRATE/MACROCRYSTALS) 25; 75 MG/1; MG/1
100 CAPSULE ORAL
Qty: 14 | Refills: 0 | Status: COMPLETED | COMMUNITY
Start: 2020-12-20

## 2021-03-01 RX ORDER — ALBUTEROL 90 UG/1
1 AEROSOL, METERED ORAL
Qty: 1 | Refills: 0
Start: 2021-03-01 | End: 2021-03-15

## 2021-03-01 RX ORDER — CEFUROXIME AXETIL 250 MG
1 TABLET ORAL
Qty: 10 | Refills: 0
Start: 2021-03-01 | End: 2021-03-05

## 2021-03-01 RX ORDER — CEFUROXIME AXETIL 250 MG
250 TABLET ORAL EVERY 12 HOURS
Refills: 0 | Status: DISCONTINUED | OUTPATIENT
Start: 2021-03-01 | End: 2021-03-01

## 2021-03-01 RX ORDER — SODIUM CHLORIDE 9 MG/ML
1 INJECTION INTRAMUSCULAR; INTRAVENOUS; SUBCUTANEOUS
Qty: 30 | Refills: 0
Start: 2021-03-01 | End: 2021-03-15

## 2021-03-01 RX ORDER — FUROSEMIDE 40 MG
20 TABLET ORAL ONCE
Refills: 0 | Status: COMPLETED | OUTPATIENT
Start: 2021-03-01 | End: 2021-03-01

## 2021-03-01 RX ORDER — LEVOTHYROXINE SODIUM 0.05 MG/1
50 TABLET ORAL
Qty: 90 | Refills: 0 | Status: DISCONTINUED | COMMUNITY
Start: 2021-02-18 | End: 2021-03-01

## 2021-03-01 RX ORDER — SODIUM CHLORIDE 9 MG/ML
1 INJECTION INTRAMUSCULAR; INTRAVENOUS; SUBCUTANEOUS
Refills: 0 | Status: DISCONTINUED | OUTPATIENT
Start: 2021-03-01 | End: 2021-03-01

## 2021-03-01 RX ORDER — POTASSIUM CHLORIDE 20 MEQ
20 PACKET (EA) ORAL ONCE
Refills: 0 | Status: COMPLETED | OUTPATIENT
Start: 2021-03-01 | End: 2021-03-01

## 2021-03-01 RX ADMIN — POLYETHYLENE GLYCOL 3350 17 GRAM(S): 17 POWDER, FOR SOLUTION ORAL at 09:40

## 2021-03-01 RX ADMIN — ENOXAPARIN SODIUM 40 MILLIGRAM(S): 100 INJECTION SUBCUTANEOUS at 09:41

## 2021-03-01 RX ADMIN — Medication 50 MICROGRAM(S): at 05:31

## 2021-03-01 RX ADMIN — Medication 240 MILLIGRAM(S): at 09:40

## 2021-03-01 RX ADMIN — NEBIVOLOL HYDROCHLORIDE 5 MILLIGRAM(S): 5 TABLET ORAL at 09:40

## 2021-03-01 RX ADMIN — SODIUM CHLORIDE 1 GRAM(S): 9 INJECTION INTRAMUSCULAR; INTRAVENOUS; SUBCUTANEOUS at 10:39

## 2021-03-01 RX ADMIN — CEFEPIME 1000 MILLIGRAM(S): 1 INJECTION, POWDER, FOR SOLUTION INTRAMUSCULAR; INTRAVENOUS at 09:40

## 2021-03-01 RX ADMIN — Medication 400 MILLIGRAM(S): at 09:40

## 2021-03-01 RX ADMIN — Medication 20 MILLIGRAM(S): at 10:39

## 2021-03-01 RX ADMIN — Medication 20 MILLIEQUIVALENT(S): at 10:39

## 2021-03-01 NOTE — DISCHARGE NOTE PROVIDER - NSDCCPCAREPLAN_GEN_ALL_CORE_FT
PRINCIPAL DISCHARGE DIAGNOSIS  Diagnosis: Hyponatremia  Assessment and Plan of Treatment: stop hydrochlorothiazide. Take salts tablets as prescribed and follow-up with Dr Gonzalez.      SECONDARY DISCHARGE DIAGNOSES  Diagnosis: Altered mental state  Assessment and Plan of Treatment: due to hyponatremia

## 2021-03-01 NOTE — DISCHARGE NOTE PROVIDER - DETAILS OF MALNUTRITION DIAGNOSIS/DIAGNOSES
This patient has been assessed with a concern for Malnutrition and was treated during this hospitalization for the following Nutrition diagnosis/diagnoses:     -  02/26/2021: Moderate protein-calorie malnutrition

## 2021-03-01 NOTE — PROGRESS NOTE ADULT - SUBJECTIVE AND OBJECTIVE BOX
HPI:  85F with past medical history of hypertension, hyperlipidemia, CAD, hypothyroidism, [place] Erika macroglobulinemia (bone marrow biopsy 12/15/2020 showing extensive low-grade B-cell lymphoproliferative infiltrate; IgM 5g), treated in ambulatory with weekly dexamethasone/bortezomib ( last dose 2/17/21) referred to the emergency room with hyponatremia (sodium 119), fatigue, mild cough, and reported episodes of confusion.  Patient was scheduled to start treatment with rituximab in ambulatory, after she refused cyclophosphamide.  This morning she feels a lot better, her sodium at 123. CT head showed no acute intracranial hemorrhage or mass effect. Started on empiric Cefepime ; tested negative for covid in ambulatory. ( 2/26/21)     2/27/21  Feels better. Denies respiratory complaints.   2/28/21  C/o constipation. Did not sleep last night because of that. Had small BM today AM. No resp c/o. Hoping to go home tomorrow.   3/1/21   Feels well. Had good BM today. No resp c/o. Wants to go home.     PAST MEDICAL & SURGICAL HISTORY:  Hypothyroidism  CAD (coronary artery disease)  HLD (hyperlipidemia)  Waldenstrom&#x27;s disease  Hypertension  S/P hysterectomy    ALLERGIES:  No Known Allergies    FAMILY HISTORY:  Reviewed, non-contributory: [ x ]     SOCIAL HISTORY:  Tobacco: YES [ ]  ; NO [ x ]; Former smoker [ ]  Alcohol:   YES [ ]  ; NO [ x ]; Social alcohol user [ ]  Occupation/ marital status/ children: lives independently, ; has children    REVIEW SYSTEMS:  Constitutional: no fever, chills, night sweats, no weight loss  HEENT: denies visual changes; no oral ulcers, dysphagia, no epistaxis; oses corrective lenses  Respiratory: no dyspnea , mild cough, no hemoptysis  Cardiovascular: denies acute chest pain, palpitations  GI: as above   Musculoskeletal: no new back pain, bone/ joint pain ,no extremity swelling  Integumentary: denies pruritus; no skin rash, bruises, no  suspicious skin lesions  Neurologic: denies peripheral numbness, occasionally confused  Heme: no reported easy bruisability; no lymph node enlargement    Vital Signs Last 24 Hrs  T(C): 36.9 (28 Feb 2021 20:38), Max: 36.9 (28 Feb 2021 08:45)  T(F): 98.4 (28 Feb 2021 20:38), Max: 98.4 (28 Feb 2021 08:45)  HR: 73 (28 Feb 2021 20:38) (66 - 73)  BP: 134/50 (28 Feb 2021 20:38) (134/50 - 157/62)  BP(mean): --  RR: 18 (28 Feb 2021 20:38) (18 - 18)  SpO2: 96% (28 Feb 2021 20:38) (92% - 98%)    PHYSICAL EXAM:  Elderly lady out of bed in chair. Looks well.  HEENT: normocephalic, anicteric sclerae, no mucositis or thrush  Respiratory: bilateral clear to auscultation anteriorly  Cardiovascular : S1, S2 regular, rhythmic, no murmurs, gallops or rubs  Abdomen: soft, not distended, + normoactive BS, no palpable HS- megaly  Extremities: no tenderness;  -c/c/e, pulses equal bilaterally  Integumentary: no rashes, scars, or lesions suggestive of malignancy  Neurologic: no gross focal deficits    LABS:  (02-25) WBC: 10.25 K/uL,Hemoglobin: 9.7 g/dL, Hematocrit: 28.3 %,  Platelet: 329 K/uL  (02-26) Na: 123 mmol/L ; K: 3.9 mmol/L ; BUN: 17 mg/dL ; Cr: 0.56 mg/dL.      02-28    123<L>  |  89<L>  |  20  ----------------------------<  108<H>  3.5   |  27  |  0.42<L>    Ca    8.6      28 Feb 2021 06:43  Mg     1.9     02-28    TPro  7.7  /  Alb  2.3<L>  /  TBili  0.7  /  DBili  x   /  AST  12<L>  /  ALT  23  /  AlkPhos  62  02-27 03-01    124<L>  |  91<L>  |  16  ----------------------------<  106<H>  3.5   |  26  |  0.37<L>    Ca    8.4<L>      01 Mar 2021 06:51  Phos  1.3     02-28  Mg     1.9     02-28    TPro  x   /  Alb  2.4<L>  /  TBili  x   /  DBili  x   /  AST  x   /  ALT  x   /  AlkPhos  x   02-28                          8.7    7.90  )-----------( 304      ( 28 Feb 2021 06:43 )             25.1

## 2021-03-01 NOTE — PROGRESS NOTE ADULT - PROVIDER SPECIALTY LIST ADULT
Heme/Onc
Hospitalist
Infectious Disease
Nephrology
Nephrology
Hospitalist
Hospitalist
Heme/Onc
Heme/Onc

## 2021-03-01 NOTE — DISCHARGE NOTE NURSING/CASE MANAGEMENT/SOCIAL WORK - PATIENT PORTAL LINK FT
You can access the FollowMyHealth Patient Portal offered by [facility] by registering at the following website: [email or web address]. By joining [employer]’s FollowMyHealth portal, you will also be able to view your health information using other applications (apps) compatible with our system.

## 2021-03-01 NOTE — PROGRESS NOTE ADULT - ASSESSMENT
85F with past medical history of hypertension, hyperlipidemia, CAD, hypothyroidism, Waldenstrom macroglobulinemia (bone marrow biopsy 12/15/2020 showing extensive low-grade B-cell lymphoproliferative infiltrate; IgM 5g), treated in ambulatory with weekly dexamethasone/bortezomib ( last dose 2/17/21) referred to the emergency room with hyponatremia (sodium 119), fatigue, mild cough, and reported episodes of confusion.  CXR showed bilateral infiltrates .   Treated with antibiotics for pneumonia. No evidence of Covid infection.  Evaluated by  nephrology . Hyponatremia in setting of  pneumonia and HCTZ treatment ( discontinued) s/p  1.5 % sodium chloride  iv. Sodium is slowly improving.  S/p two cycles of Velcade/ Dex - IgM improving.     Hyponatremia improving. mental status back to normal baseline.  Pneumonia treated with antibiotics. No resp symptoms.  Clinically much better.  From oncology point can be discharged home with outpt follow up ( has appt on Wednesday).

## 2021-03-01 NOTE — DISCHARGE NOTE PROVIDER - HOSPITAL COURSE
85F with Waldenstrom macroglobulinemia (NHL), HTN, HLD, CAD, hypothyroidism, vision loss, and [place] (with hearing aid) sent by Dr. Locke for hyponatremia. Patient reports some fatigue, but denies other symptoms. Notes she has tried to minimize salt in her diet at the recommendation of her PCP. Denies chest pain, cough, dyspnea, fever, chills.   Per son Toy Byers, patient has been confused about her medications and how to take them, but he feels that this is less true confusion and more her being overwhelmed by her new treatment regimen for Waldenstrom macroglobulinemia. He also reports she has had some insomnia since starting this regimen, which includes steroids. He believes she is taking sleeping pills occasionally, but does not know which ones. Also states patient recently had constipation alleviated by miralax. Patient lives with her , and is independent in ADLs and IADL    HOSPITAL COURSE:  #Metabolic encephalopathy secondary to hyponatremia  -Mild confusion likely secondary to hyponatremia. CT head demonstrated no acute findings  -Acute on chronic hyponatremia. There is likely a dietary component as patient intentionally reduced salt intake as well as hydrochlorothiazide use.   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 per discussion with renal - may add NaCL tabs if needed.  2/28 - discussed with renal - plan to add nacl tabs today as Na not much improved   3/1- on NaCL tabs, plan to dc today after recheck labs    #Acute hypoxia, Secondary to pneumonia, lani gram negative phillip bacterial pneumonia, present on admission   -Requiring 2L nasal cannula; desaturated to 88% on room air  -Unclear if patient has true pneumonia. Scattered infiltrates on CXR reported to be chronic. Patient has no fever, chills, cough, or dyspnea, though does have hypoxia and hyponatremia. -Will continue vanco/cefepime and monitor  2/27 - cont IV Cefepime, added tessalon  - - rec OOB to chair and PT consult and IS   - will need to check Pulse Ox for home O2 eval in 1-2 days   2/28 - doing better, cont IV cefepime today and will switch to augmentin or ceftin tomorrow   3/1 - doing better, change to PO Ceftin on discharge     #Chronic anemia  -At b/l. Secondary to lymphoma    #Protein calorie malnutrition  -[place] diet  -Nutrition consult pending     #Waldenstrom macroglobulinemia   - Acyclovir 400mg BID for PPx. Due for chemotherapy on Wednesday; anticipate d/c prior to next session  - Patient sees Dr. Locke who sent patient in for evaluation    #HTN   - Diltiazem XR 240mg daily, HCTZ 12.5mg daily, losartan 100mg qhs, nebivolol 5mg qhs    #HLD/CAD  - Atorvastatin 20mg qhs    #Hypothyroidism   - Synthroid 50mcg daily    #Advance care planning  -HCP: Mary Kay Mckeon (daughter) [phone] (per verbal patient request; patient has not completed a HCP form)  -Patient prefers FULL CODE    discussed with RN and Renal  DC planning - 55 mins  will call Mary Kay, patient's daughter     OTHER DETAILS  3/1/21 - no cp palps sob, feels better and is ambulating in the hallway   PHYSICAL EXAM:  GENERAL: NAD, able to lie flat in bed  HEAD:  Atraumatic, Normocephalic  EYES: EOMI, PERRLA, normal sclera  ENT: Moist mucous membranes  NECK: Supple, No JVD, no nuchal rigidity  CHEST/LUNG: Clear to auscultation bilaterally; No rales, rhonchi, wheezing, or rubs. Unlabored respirations  HEART: Regular rate and rhythm; No murmurs, rubs, or gallops  ABDOMEN: Bowel sounds present; Soft, Nontender, Nondistended. No hepatomegaly  EXTREMITIES:  no pitting bilaterally  NERVOUS SYSTEM:  Alert & Oriented X3, speech clear. No focal motor or sensory deficits  MSK: FROM all 4 extremities, full and equal strength  SKIN: No rashes or lesions    LABS: All Labs Reviewed:                     8.7    7.90  )-----------( 304      ( 28 Feb 2021 06:43 )             25.1   124<L>  |  91<L>  |  16  ----------------------------<  106<H>  3.5   |  26  |  0.37<L>  RADIOLOGY/EKG:  < from: CT Head No Cont (02.25.21 @ 14:33) >  IMPRESSION: No acute intracranial hemorrhage or mass effect.    discussed with RN and Renal  DC planning - 55 mins  will call Mary Kay, patient's daughter

## 2021-03-01 NOTE — DISCHARGE NOTE PROVIDER - CARE PROVIDER_API CALL
Marcelino Gonzalez  INTERNAL MEDICINE  [address] [place] [address]  Phone: [phone]  Fax: [phone]  Follow Up Time: 1 week    Javier Wyatt  FAMILY MEDICINE  [address] [place] [address], [place] [address]  Phone: [phone]  Fax: [phone]  Follow Up Time: 1 week

## 2021-03-01 NOTE — DISCHARGE NOTE PROVIDER - PROVIDER TOKENS
PROVIDER:[TOKEN:[[id number]],FOLLOWUP:[1 week]],PROVIDER:[TOKEN:[[id number]],FOLLOWUP:[1 week]]

## 2021-03-01 NOTE — PROGRESS NOTE ADULT - SUBJECTIVE AND OBJECTIVE BOX
Date of service: 03-01-21 @ 11:26        Patient lying in bed; afebrile, no complaints    ROS: no fever or chills; denies dizziness, no HA, no SOB or cough, no abdominal pain, no diarrhea or constipation; no dysuria, no urinary frequency, no legs pain, no rashes    MEDICATIONS  (STANDING):  acyclovir   Oral Tab/Cap 400 milliGRAM(s) Oral two times a day  atorvastatin 20 milliGRAM(s) Oral at bedtime  cefuroxime   Tablet 250 milliGRAM(s) Oral every 12 hours  diltiazem    milliGRAM(s) Oral daily  enoxaparin Injectable 40 milliGRAM(s) SubCutaneous daily  levothyroxine 50 MICROGram(s) Oral daily  losartan 100 milliGRAM(s) Oral at bedtime  nebivolol 5 milliGRAM(s) Oral daily  polyethylene glycol 3350 17 Gram(s) Oral two times a day  sodium chloride 1 Gram(s) Oral two times a day  sodium chloride 1.5%. 500 milliLiter(s) (35 mL/Hr) IV Continuous <Continuous>    MEDICATIONS  (PRN):  acetaminophen   Tablet .. 650 milliGRAM(s) Oral every 6 hours PRN Mild Pain (1 - 3)  ALBUTerol    90 MICROgram(s) HFA Inhaler 1 Puff(s) Inhalation every 4 hours PRN Shortness of Breath and/or Wheezing  benzonatate 100 milliGRAM(s) Oral every 8 hours PRN Cough      Vital Signs Last 24 Hrs  T(C): 36.7 (01 Mar 2021 09:22), Max: 36.9 (28 Feb 2021 20:38)  T(F): 98.1 (01 Mar 2021 09:22), Max: 98.4 (28 Feb 2021 20:38)  HR: 78 (01 Mar 2021 09:22) (66 - 78)  BP: 136/54 (01 Mar 2021 09:22) (134/50 - 141/54)  BP(mean): --  RR: 18 (01 Mar 2021 09:22) (18 - 18)  SpO2: 95% (01 Mar 2021 09:22) (95% - 98%)        Physical Exam:          Constitutional: frail looking  HEENT: NC/AT, EOMI, PERRLA, conjunctivae clear; ears and nose atraumatic; pharynx clear  Neck: supple; thyroid not palpable  Back: no tenderness  Respiratory: respiratory effort normal; clear to auscultation  Cardiovascular: S1S2 regular, no murmurs  Abdomen: soft, not tender, not distended, positive BS; no liver or spleen organomegaly  Genitourinary: no suprapubic tenderness  Musculoskeletal: no muscle tenderness, no joint swelling or tenderness  Neurological/ Psychiatric: moving all extremities  Skin: no rashes; no palpable lesions    Labs: all available labs reviewed                        Labs:                        8.7    7.90  )-----------( 304      ( 28 Feb 2021 06:43 )             25.1     03-01    124<L>  |  91<L>  |  16  ----------------------------<  106<H>  3.5   |  26  |  0.37<L>    Ca    8.4<L>      01 Mar 2021 06:51  Phos  1.3     02-28  Mg     1.9     02-28    TPro  x   /  Alb  2.4<L>  /  TBili  x   /  DBili  x   /  AST  x   /  ALT  x   /  AlkPhos  x   02-28           Cultures:       Culture - Blood (collected 02-25-21 @ 15:22)  Source: .Blood None  Preliminary Report (02-26-21 @ 20:01):    No growth to date.    Culture - Blood (collected 02-25-21 @ 15:22)  Source: .Blood None  Preliminary Report (02-26-21 @ 20:01):    No growth to date.              COVID-19 PCR . (02.25.21 @ 13:10)    COVID-19 PCR: NotDetec: You can help in the fight against COVID-19. [facility] may contact  you to see if you are interested in voluntarily participating in one of  our clinical trials.  Testing is performed using polymerase chain reaction (PCR) or  transcription mediated amplification (TMA). This COVID-19 (SARS-CoV-2)  nucleic acid amplification test was validated by [facility] and is  in use under the FDA Emergency Use Authorization (EUA) for clinical labs  CLIA-certified to perform high complexity testing. Test results should be  correlated with clinical presentation, patient history, and epidemiology.          Radiology: all available radiological tests reviewed  < from: Xray Chest 1 View- PORTABLE-Routine (Xray Chest 1 View- PORTABLE-Routine .) (02.25.21 @ 14:00) >    EXAM:  XR CHEST PORTABLE ROUTINE 1V                            PROCEDURE DATE:  02/25/2021          INTERPRETATION:  History: Weakness dyspnea    Chest:  one view.    Comparison: 02/25/2021    AP radiograph of the chest demonstrates unchanged scattered focal infiltrates in the BILATERAL mid lungs and upper and lower lobes. The cardiac silhouette is normal in size. Osseous structures are intact.    Impression:unchanged scattered focal infiltrates in the BILATERAL mid lungs and upper and lower lobes.    < end of copied text >    Advanced directives addressed: full resuscitation

## 2021-03-01 NOTE — PROGRESS NOTE ADULT - ASSESSMENT
85F with Waldenstrom macroglobulinemia (NHL), HTN, HLD, CAD, hypothyroidism, vision loss, and [place] (with hearing aid) admitted on 2/25 for evaluation of low sodium from her oncologist office; the patient is fatigued and has mild cough; she is not around any sick contacts and overall the history per medical record as patient is unable to provide history.   1. Patient admitted with mild confusion, low sodium levels and imaging consistent with  pneumonia; no evidence to suggest COVID-19 infection  - follow up cultures   - serial cbc and monitor temperature   - oxygen and nebs as needed   - reviewed prior medical records to evaluate for resistant or atypical pathogens   - iv hydration and supportive care and correction of sodium per medicine  - completed 5 days of cefepime, will switch to ceftin 250 mg po q 12 hours for 5 more days  - discharge planning    2. other issues: per medicine

## 2021-03-01 NOTE — DISCHARGE NOTE PROVIDER - NSDCMRMEDTOKEN_GEN_ALL_CORE_FT
acyclovir 400 mg oral tablet: 1 tab(s) orally 2 times a day  albuterol 90 mcg/inh inhalation aerosol: 1 puff(s) inhaled every 6 hours, As Needed -Shortness of Breath and/or Wheezing   atorvastatin 20 mg oral tablet: 1 tab(s) orally once a day  Bystolic 5 mg oral tablet: 1 tab(s) orally once a day (at bedtime)  cefuroxime 250 mg oral tablet: 1 tab(s) orally every 12 hours  Decadron: 20 milligram(s) orally once a day  ****On day of chemotherapy and day after chemotherapy ONLY*** (Wednesday and Thursday)  dilTIAZem 240 mg/24 hours oral capsule, extended release: 1 cap(s) orally once a day  levothyroxine 50 mcg (0.05 mg) oral tablet: 1 tab(s) orally once a day  losartan 100 mg oral tablet: 1 tab(s) orally once a day (at bedtime)  sodium chloride 1 g oral tablet: 1 tab(s) orally 2 times a day

## 2021-03-01 NOTE — PROGRESS NOTE ADULT - REASON FOR ADMISSION
Hyponatremia and encephalopathy
hyponatremia
Hyponatremia and encephalopathy
Hyponatremia and encephalopathy

## 2021-03-02 LAB
CULTURE RESULTS: SIGNIFICANT CHANGE UP
SPECIMEN SOURCE: SIGNIFICANT CHANGE UP

## 2021-03-03 ENCOUNTER — APPOINTMENT (OUTPATIENT)
Dept: INFUSION THERAPY | Facility: CLINIC | Age: 86
End: 2021-03-03

## 2021-03-03 ENCOUNTER — APPOINTMENT (OUTPATIENT)
Dept: HEMATOLOGY ONCOLOGY | Facility: CLINIC | Age: 86
End: 2021-03-03

## 2021-03-03 ENCOUNTER — NON-APPOINTMENT (OUTPATIENT)
Age: 86
End: 2021-03-03

## 2021-03-03 LAB
CULTURE RESULTS: SIGNIFICANT CHANGE UP
SPECIMEN SOURCE: SIGNIFICANT CHANGE UP

## 2021-03-06 LAB
CORTICOSTEROID BINDING GLOBULIN RESULT: 1.2 MG/DL — LOW
CORTIS F/TOTAL MFR SERPL: 53 % — SIGNIFICANT CHANGE UP
CORTIS SERPL-MCNC: 17 UG/DL — SIGNIFICANT CHANGE UP
CORTISOL, FREE RESULT: 9.1 UG/DL — HIGH

## 2021-03-08 DIAGNOSIS — R09.02 HYPOXEMIA: ICD-10-CM

## 2021-03-08 DIAGNOSIS — E22.2 SYNDROME OF INAPPROPRIATE SECRETION OF ANTIDIURETIC HORMONE: ICD-10-CM

## 2021-03-08 DIAGNOSIS — T45.1X5A ADVERSE EFFECT OF ANTINEOPLASTIC AND IMMUNOSUPPRESSIVE DRUGS, INITIAL ENCOUNTER: ICD-10-CM

## 2021-03-08 DIAGNOSIS — G93.41 METABOLIC ENCEPHALOPATHY: ICD-10-CM

## 2021-03-08 DIAGNOSIS — Z90.710 ACQUIRED ABSENCE OF BOTH CERVIX AND UTERUS: ICD-10-CM

## 2021-03-08 DIAGNOSIS — Z92.21 PERSONAL HISTORY OF ANTINEOPLASTIC CHEMOTHERAPY: ICD-10-CM

## 2021-03-08 DIAGNOSIS — K59.00 CONSTIPATION, UNSPECIFIED: ICD-10-CM

## 2021-03-08 DIAGNOSIS — F03.90 UNSPECIFIED DEMENTIA WITHOUT BEHAVIORAL DISTURBANCE: ICD-10-CM

## 2021-03-08 DIAGNOSIS — H91.90 UNSPECIFIED HEARING LOSS, UNSPECIFIED EAR: ICD-10-CM

## 2021-03-08 DIAGNOSIS — C88.0 WALDENSTROM MACROGLOBULINEMIA: ICD-10-CM

## 2021-03-08 DIAGNOSIS — J15.6 PNEUMONIA DUE TO OTHER GRAM-NEGATIVE BACTERIA: ICD-10-CM

## 2021-03-08 DIAGNOSIS — G47.00 INSOMNIA, UNSPECIFIED: ICD-10-CM

## 2021-03-08 DIAGNOSIS — I25.10 ATHEROSCLEROTIC HEART DISEASE OF NATIVE CORONARY ARTERY WITHOUT ANGINA PECTORIS: ICD-10-CM

## 2021-03-08 DIAGNOSIS — E44.0 MODERATE PROTEIN-CALORIE MALNUTRITION: ICD-10-CM

## 2021-03-08 DIAGNOSIS — E03.9 HYPOTHYROIDISM, UNSPECIFIED: ICD-10-CM

## 2021-03-08 DIAGNOSIS — E78.5 HYPERLIPIDEMIA, UNSPECIFIED: ICD-10-CM

## 2021-03-08 DIAGNOSIS — D63.0 ANEMIA IN NEOPLASTIC DISEASE: ICD-10-CM

## 2021-03-08 DIAGNOSIS — I10 ESSENTIAL (PRIMARY) HYPERTENSION: ICD-10-CM

## 2021-03-08 DIAGNOSIS — T50.2X5A ADVERSE EFFECT OF CARBONIC-ANHYDRASE INHIBITORS, BENZOTHIADIAZIDES AND OTHER DIURETICS, INITIAL ENCOUNTER: ICD-10-CM

## 2021-12-08 NOTE — PATIENT PROFILE ADULT - NSPROHMSYMPCOND_GEN_A_NUR
Department of Anesthesiology  Postprocedure Note    Patient: Jerod Luciano  MRN: [id number]  YOB: 1949  Date of evaluation: 12/8/2021  Time:  5:40 PM     Procedure Summary     Date: 12/08/21 Room / Location: [address] [place] [address]  / [place]    Anesthesia Start: 1400 Anesthesia Stop: 0166    Procedure: LEFT TOTAL KNEE ARTHROPLASTY (Left Knee) Diagnosis: (-)    Surgeons: Desiree Lee MD Responsible Provider: Loyd Morales MD    Anesthesia Type: general ASA Status: 3          Anesthesia Type: general    Melyssa Phase I: Melyssa Score: 9    Melyssa Phase II:      Last vitals: Reviewed and per EMR flowsheets.      Vitals:    12/08/21 1720 12/08/21 1725 12/08/21 1730 12/08/21 1735   BP: (!) 108/94 135/87 (!) 173/87 (!) 149/101   Pulse: 80 81 80 80   Resp: 11 18 12 16   Temp:       TempSrc:       SpO2: 98% 99% 99% 96%   Weight:       Height:         Anesthesia Post Evaluation    Patient location during evaluation: bedside  Patient participation: complete - patient participated  Level of consciousness: awake and alert  Airway patency: patent  Nausea & Vomiting: no nausea  Complications: no  Cardiovascular status: hemodynamically stable  Respiratory status: acceptable  Hydration status: euvolemic Waldenstrom's Disease, chemo/cancer

## 2022-08-13 ENCOUNTER — INPATIENT (INPATIENT)
Facility: HOSPITAL | Age: 87
LOS: 5 days | Discharge: HOME HEALTH | DRG: 242 | End: 2022-08-19
Attending: INTERNAL MEDICINE | Admitting: INTERNAL MEDICINE
Payer: MEDICARE

## 2022-08-13 VITALS — WEIGHT: 139.99 LBS | HEIGHT: 61.5 IN

## 2022-08-13 DIAGNOSIS — Z90.710 ACQUIRED ABSENCE OF BOTH CERVIX AND UTERUS: Chronic | ICD-10-CM

## 2022-08-13 LAB
ALBUMIN SERPL ELPH-MCNC: 4 G/DL — SIGNIFICANT CHANGE UP (ref 3.3–5)
ALP SERPL-CCNC: 79 U/L — SIGNIFICANT CHANGE UP (ref 40–120)
ALT FLD-CCNC: 51 U/L — SIGNIFICANT CHANGE UP (ref 12–78)
ANION GAP SERPL CALC-SCNC: 6 MMOL/L — SIGNIFICANT CHANGE UP (ref 5–17)
APTT BLD: 24.8 SEC — LOW (ref 27.5–35.5)
AST SERPL-CCNC: 16 U/L — SIGNIFICANT CHANGE UP (ref 15–37)
BASOPHILS # BLD AUTO: 0 K/UL — SIGNIFICANT CHANGE UP (ref 0–0.2)
BASOPHILS NFR BLD AUTO: 0 % — SIGNIFICANT CHANGE UP (ref 0–2)
BILIRUB SERPL-MCNC: 0.5 MG/DL — SIGNIFICANT CHANGE UP (ref 0.2–1.2)
BUN SERPL-MCNC: 45 MG/DL — HIGH (ref 7–23)
CALCIUM SERPL-MCNC: 10 MG/DL — SIGNIFICANT CHANGE UP (ref 8.5–10.1)
CHLORIDE SERPL-SCNC: 110 MMOL/L — HIGH (ref 96–108)
CO2 SERPL-SCNC: 25 MMOL/L — SIGNIFICANT CHANGE UP (ref 22–31)
CREAT SERPL-MCNC: 1.26 MG/DL — SIGNIFICANT CHANGE UP (ref 0.5–1.3)
EGFR: 41 ML/MIN/1.73M2 — LOW
EOSINOPHIL # BLD AUTO: 0 K/UL — SIGNIFICANT CHANGE UP (ref 0–0.5)
EOSINOPHIL NFR BLD AUTO: 0 % — SIGNIFICANT CHANGE UP (ref 0–6)
GLUCOSE SERPL-MCNC: 167 MG/DL — HIGH (ref 70–99)
HCT VFR BLD CALC: 35.6 % — SIGNIFICANT CHANGE UP (ref 34.5–45)
HGB BLD-MCNC: 11.4 G/DL — LOW (ref 11.5–15.5)
INR BLD: 1.11 RATIO — SIGNIFICANT CHANGE UP (ref 0.88–1.16)
LACTATE SERPL-SCNC: 2 MMOL/L — SIGNIFICANT CHANGE UP (ref 0.7–2)
LIDOCAIN IGE QN: 118 U/L — SIGNIFICANT CHANGE UP (ref 73–393)
LYMPHOCYTES # BLD AUTO: 1.88 K/UL — SIGNIFICANT CHANGE UP (ref 1–3.3)
LYMPHOCYTES # BLD AUTO: 14 % — SIGNIFICANT CHANGE UP (ref 13–44)
MAGNESIUM SERPL-MCNC: 2.1 MG/DL — SIGNIFICANT CHANGE UP (ref 1.6–2.6)
MCHC RBC-ENTMCNC: 32 GM/DL — SIGNIFICANT CHANGE UP (ref 32–36)
MCHC RBC-ENTMCNC: 33.6 PG — SIGNIFICANT CHANGE UP (ref 27–34)
MCV RBC AUTO: 105 FL — HIGH (ref 80–100)
MONOCYTES # BLD AUTO: 0.81 K/UL — SIGNIFICANT CHANGE UP (ref 0–0.9)
MONOCYTES NFR BLD AUTO: 6 % — SIGNIFICANT CHANGE UP (ref 2–14)
NEUTROPHILS # BLD AUTO: 10.74 K/UL — HIGH (ref 1.8–7.4)
NEUTROPHILS NFR BLD AUTO: 80 % — HIGH (ref 43–77)
NRBC # BLD: SIGNIFICANT CHANGE UP /100 WBCS (ref 0–0)
NT-PROBNP SERPL-SCNC: HIGH PG/ML (ref 0–450)
PLATELET # BLD AUTO: 257 K/UL — SIGNIFICANT CHANGE UP (ref 150–400)
POTASSIUM SERPL-MCNC: 4.5 MMOL/L — SIGNIFICANT CHANGE UP (ref 3.5–5.3)
POTASSIUM SERPL-SCNC: 4.5 MMOL/L — SIGNIFICANT CHANGE UP (ref 3.5–5.3)
PROT SERPL-MCNC: 7.4 GM/DL — SIGNIFICANT CHANGE UP (ref 6–8.3)
PROTHROM AB SERPL-ACNC: 12.9 SEC — SIGNIFICANT CHANGE UP (ref 10.5–13.4)
RBC # BLD: 3.39 M/UL — LOW (ref 3.8–5.2)
RBC # FLD: 15.6 % — HIGH (ref 10.3–14.5)
SODIUM SERPL-SCNC: 141 MMOL/L — SIGNIFICANT CHANGE UP (ref 135–145)
TROPONIN I, HIGH SENSITIVITY RESULT: 143.14 NG/L — HIGH
WBC # BLD: 13.43 K/UL — HIGH (ref 3.8–10.5)
WBC # FLD AUTO: 13.43 K/UL — HIGH (ref 3.8–10.5)

## 2022-08-13 PROCEDURE — 93010 ELECTROCARDIOGRAM REPORT: CPT

## 2022-08-13 PROCEDURE — 71045 X-RAY EXAM CHEST 1 VIEW: CPT | Mod: 26

## 2022-08-13 PROCEDURE — 99291 CRITICAL CARE FIRST HOUR: CPT

## 2022-08-13 PROCEDURE — 71275 CT ANGIOGRAPHY CHEST: CPT | Mod: 26,MA

## 2022-08-13 RX ORDER — VANCOMYCIN HCL 1 G
1000 VIAL (EA) INTRAVENOUS ONCE
Refills: 0 | Status: COMPLETED | OUTPATIENT
Start: 2022-08-13 | End: 2022-08-13

## 2022-08-13 RX ORDER — NITROGLYCERIN 6.5 MG
1 CAPSULE, EXTENDED RELEASE ORAL ONCE
Refills: 0 | Status: COMPLETED | OUTPATIENT
Start: 2022-08-13 | End: 2022-08-13

## 2022-08-13 RX ORDER — FUROSEMIDE 40 MG
40 TABLET ORAL ONCE
Refills: 0 | Status: COMPLETED | OUTPATIENT
Start: 2022-08-13 | End: 2022-08-13

## 2022-08-13 RX ORDER — PIPERACILLIN AND TAZOBACTAM 4; .5 G/20ML; G/20ML
3.38 INJECTION, POWDER, LYOPHILIZED, FOR SOLUTION INTRAVENOUS ONCE
Refills: 0 | Status: COMPLETED | OUTPATIENT
Start: 2022-08-13 | End: 2022-08-13

## 2022-08-13 NOTE — ED PROVIDER NOTE - CLINICAL SUMMARY MEDICAL DECISION MAKING FREE TEXT BOX
Cough with hypoxia; hx of lymphoma; CXR with abnormal infiltrates; will cover with empiric antibiotics, check cardiac labs, get CTA chest to r/o PE and admit on O2.

## 2022-08-13 NOTE — ED PROVIDER NOTE - NSICDXPASTMEDICALHX_GEN_ALL_CORE_FT
PAST MEDICAL HISTORY:  CAD (coronary artery disease)     HLD (hyperlipidemia)     Hypertension     Hypothyroidism     Waldenstrom's disease

## 2022-08-13 NOTE — ED ADULT NURSE NOTE - OBJECTIVE STATEMENT
pt presenting initially for "multiple medical complaints", of elevated HR and BP, with arm pains in right. in triage on arrival found to be hypoxic, brought back to room 9 in main w/RR of 30's and O2 sat of low 80's. tachypnic, posturing, in apparent distress. pt denies ever using O2, COPD or CHF HX. 4L NC not improving saturation, switched to 15L NRB, pt stabilized and appears to be less distraught.  EKG, labwork and MD @ bedside, CXR complete, pnd further orders

## 2022-08-13 NOTE — ED PROVIDER NOTE - OBJECTIVE STATEMENT
Pt. is a 88 yo F with a hx of HTN, Waldenstroms lymphoma, hypothyroidism, hyperlipidemia, CHF presenting with difficulty breathing and pain in bilateral arms and upper back.  Patient has had symptoms for the past 2 nights.  +wet cough.  No fever, nausea, abdominal pain or vomiting.  Daughter went to house and patient complained of feeling dizzy, being unable to breathe and feeling like she had to use the bathroom.  No dysuria or hematuria.  Loose stool but denies diarrhea. Daughter states earlier this week patient had bystolic removed from med list due to asymptomatic bradycardia and now HR and blood pressure has been elevated for 2 days.

## 2022-08-13 NOTE — ED PROVIDER NOTE - CARE PLAN
1 Principal Discharge DX:	Acute respiratory failure with hypoxia  Secondary Diagnosis:	Healthcare associated bacterial pneumonia  Secondary Diagnosis:	Acute pulmonary edema

## 2022-08-13 NOTE — ED PROVIDER NOTE - PROGRESS NOTE DETAILS
Contacted ICU as patient becoming more tachypneic and on NRB dropped to about 87%.  Patient sweaty.  Patient just placed on bipap.  Doing better now SaO2 95% on bipap.  ICU contacted for consult.  PA coming to see patient. Patient speaking full sentences through bipap mask; breathing much better. PA came to see patient and discussed case with Dr. Steele who recommends stat echo to r/o CHF.  Patient clinically has CHF and responding to bipap and lasix.  Will be admitted to CICU but echo likely to be done in the morning. Patients HR currently sinus bradycardia so pacer pads applied.

## 2022-08-13 NOTE — ED ADULT TRIAGE NOTE - CHIEF COMPLAINT QUOTE
pt c/o high HR at home and around 1900 this evening. pt states bp at home 156/76 at home and HR 90. pt denies chest pain, endorses nausea, SOB and pain in arms bilaterally. pt in wheelchair in triage. ambulatory at baseline.

## 2022-08-13 NOTE — ED PROVIDER NOTE - SKIN, MLM
Skin normal color for race, warm, dry and intact. No evidence of rash. good cap refill. no cyanosis.

## 2022-08-14 DIAGNOSIS — J96.01 ACUTE RESPIRATORY FAILURE WITH HYPOXIA: ICD-10-CM

## 2022-08-14 PROBLEM — I10 ESSENTIAL (PRIMARY) HYPERTENSION: Chronic | Status: ACTIVE | Noted: 2021-02-25

## 2022-08-14 PROBLEM — I25.10 ATHEROSCLEROTIC HEART DISEASE OF NATIVE CORONARY ARTERY WITHOUT ANGINA PECTORIS: Chronic | Status: ACTIVE | Noted: 2021-02-25

## 2022-08-14 PROBLEM — E03.9 HYPOTHYROIDISM, UNSPECIFIED: Chronic | Status: ACTIVE | Noted: 2021-02-26

## 2022-08-14 PROBLEM — E78.5 HYPERLIPIDEMIA, UNSPECIFIED: Chronic | Status: ACTIVE | Noted: 2021-02-25

## 2022-08-14 PROBLEM — C88.0 WALDENSTROM MACROGLOBULINEMIA: Chronic | Status: ACTIVE | Noted: 2021-02-25

## 2022-08-14 LAB
A1C WITH ESTIMATED AVERAGE GLUCOSE RESULT: 5.7 % — HIGH (ref 4–5.6)
ANION GAP SERPL CALC-SCNC: 6 MMOL/L — SIGNIFICANT CHANGE UP (ref 5–17)
APTT BLD: 27.5 SEC — SIGNIFICANT CHANGE UP (ref 27.5–35.5)
APTT BLD: 88.4 SEC — HIGH (ref 27.5–35.5)
BASE EXCESS BLDA CALC-SCNC: -1.3 MMOL/L — SIGNIFICANT CHANGE UP (ref -2–3)
BUN SERPL-MCNC: 50 MG/DL — HIGH (ref 7–23)
CALCIUM SERPL-MCNC: 9.8 MG/DL — SIGNIFICANT CHANGE UP (ref 8.5–10.1)
CHLORIDE SERPL-SCNC: 108 MMOL/L — SIGNIFICANT CHANGE UP (ref 96–108)
CHOLEST SERPL-MCNC: 93 MG/DL — SIGNIFICANT CHANGE UP
CO2 BLDA-SCNC: 24 MMOL/L — SIGNIFICANT CHANGE UP (ref 19–24)
CO2 SERPL-SCNC: 26 MMOL/L — SIGNIFICANT CHANGE UP (ref 22–31)
CREAT SERPL-MCNC: 1.28 MG/DL — SIGNIFICANT CHANGE UP (ref 0.5–1.3)
EGFR: 41 ML/MIN/1.73M2 — LOW
ESTIMATED AVERAGE GLUCOSE: 117 MG/DL — HIGH (ref 68–114)
FOLATE SERPL-MCNC: >20 NG/ML — SIGNIFICANT CHANGE UP
GAS PNL BLDA: SIGNIFICANT CHANGE UP
GLUCOSE SERPL-MCNC: 124 MG/DL — HIGH (ref 70–99)
HCO3 BLDA-SCNC: 23 MMOL/L — SIGNIFICANT CHANGE UP (ref 21–28)
HCT VFR BLD CALC: 31.1 % — LOW (ref 34.5–45)
HCT VFR BLD CALC: 33.6 % — LOW (ref 34.5–45)
HDLC SERPL-MCNC: 57 MG/DL — SIGNIFICANT CHANGE UP
HGB BLD-MCNC: 10.1 G/DL — LOW (ref 11.5–15.5)
HGB BLD-MCNC: 11.1 G/DL — LOW (ref 11.5–15.5)
HOROWITZ INDEX BLDA+IHG-RTO: SIGNIFICANT CHANGE UP
LIPID PNL WITH DIRECT LDL SERPL: 25 MG/DL — SIGNIFICANT CHANGE UP
MAGNESIUM SERPL-MCNC: 2.1 MG/DL — SIGNIFICANT CHANGE UP (ref 1.6–2.6)
MCHC RBC-ENTMCNC: 32.5 GM/DL — SIGNIFICANT CHANGE UP (ref 32–36)
MCHC RBC-ENTMCNC: 33 GM/DL — SIGNIFICANT CHANGE UP (ref 32–36)
MCHC RBC-ENTMCNC: 33.9 PG — SIGNIFICANT CHANGE UP (ref 27–34)
MCHC RBC-ENTMCNC: 34.5 PG — HIGH (ref 27–34)
MCV RBC AUTO: 104.3 FL — HIGH (ref 80–100)
MCV RBC AUTO: 104.4 FL — HIGH (ref 80–100)
NON HDL CHOLESTEROL: 35 MG/DL — SIGNIFICANT CHANGE UP
PCO2 BLDA: 35 MMHG — SIGNIFICANT CHANGE UP (ref 32–35)
PH BLDA: 7.42 — SIGNIFICANT CHANGE UP (ref 7.35–7.45)
PHOSPHATE SERPL-MCNC: 3.3 MG/DL — SIGNIFICANT CHANGE UP (ref 2.5–4.5)
PLATELET # BLD AUTO: 202 K/UL — SIGNIFICANT CHANGE UP (ref 150–400)
PLATELET # BLD AUTO: 222 K/UL — SIGNIFICANT CHANGE UP (ref 150–400)
PO2 BLDA: 254 MMHG — HIGH (ref 83–108)
POTASSIUM SERPL-MCNC: 4.1 MMOL/L — SIGNIFICANT CHANGE UP (ref 3.5–5.3)
POTASSIUM SERPL-SCNC: 4.1 MMOL/L — SIGNIFICANT CHANGE UP (ref 3.5–5.3)
PROCALCITONIN SERPL-MCNC: 0.41 NG/ML — HIGH (ref 0.02–0.1)
RAPID RVP RESULT: SIGNIFICANT CHANGE UP
RBC # BLD: 2.98 M/UL — LOW (ref 3.8–5.2)
RBC # BLD: 3.22 M/UL — LOW (ref 3.8–5.2)
RBC # FLD: 15.6 % — HIGH (ref 10.3–14.5)
RBC # FLD: 15.6 % — HIGH (ref 10.3–14.5)
SAO2 % BLDA: 100 % — SIGNIFICANT CHANGE UP
SARS-COV-2 RNA SPEC QL NAA+PROBE: SIGNIFICANT CHANGE UP
SODIUM SERPL-SCNC: 140 MMOL/L — SIGNIFICANT CHANGE UP (ref 135–145)
TRIGL SERPL-MCNC: 51 MG/DL — SIGNIFICANT CHANGE UP
TROPONIN I, HIGH SENSITIVITY RESULT: 1478.3 NG/L — HIGH
TROPONIN I, HIGH SENSITIVITY RESULT: 432.9 NG/L — HIGH
TROPONIN I, HIGH SENSITIVITY RESULT: 962.23 NG/L — HIGH
TSH SERPL-MCNC: 1.89 UU/ML — SIGNIFICANT CHANGE UP (ref 0.34–4.82)
VIT B12 SERPL-MCNC: 954 PG/ML — SIGNIFICANT CHANGE UP (ref 232–1245)
WBC # BLD: 9.67 K/UL — SIGNIFICANT CHANGE UP (ref 3.8–10.5)
WBC # BLD: 9.7 K/UL — SIGNIFICANT CHANGE UP (ref 3.8–10.5)
WBC # FLD AUTO: 9.67 K/UL — SIGNIFICANT CHANGE UP (ref 3.8–10.5)
WBC # FLD AUTO: 9.7 K/UL — SIGNIFICANT CHANGE UP (ref 3.8–10.5)

## 2022-08-14 PROCEDURE — 80061 LIPID PANEL: CPT

## 2022-08-14 PROCEDURE — 93005 ELECTROCARDIOGRAM TRACING: CPT

## 2022-08-14 PROCEDURE — C1889: CPT

## 2022-08-14 PROCEDURE — 82746 ASSAY OF FOLIC ACID SERUM: CPT

## 2022-08-14 PROCEDURE — C1898: CPT

## 2022-08-14 PROCEDURE — 97116 GAIT TRAINING THERAPY: CPT | Mod: GP

## 2022-08-14 PROCEDURE — 83880 ASSAY OF NATRIURETIC PEPTIDE: CPT

## 2022-08-14 PROCEDURE — 93460 R&L HRT ART/VENTRICLE ANGIO: CPT

## 2022-08-14 PROCEDURE — 86850 RBC ANTIBODY SCREEN: CPT

## 2022-08-14 PROCEDURE — 80048 BASIC METABOLIC PNL TOTAL CA: CPT

## 2022-08-14 PROCEDURE — 83735 ASSAY OF MAGNESIUM: CPT

## 2022-08-14 PROCEDURE — 99223 1ST HOSP IP/OBS HIGH 75: CPT

## 2022-08-14 PROCEDURE — 84484 ASSAY OF TROPONIN QUANT: CPT

## 2022-08-14 PROCEDURE — 84443 ASSAY THYROID STIM HORMONE: CPT

## 2022-08-14 PROCEDURE — 97163 PT EVAL HIGH COMPLEX 45 MIN: CPT | Mod: GP

## 2022-08-14 PROCEDURE — C1769: CPT

## 2022-08-14 PROCEDURE — 85730 THROMBOPLASTIN TIME PARTIAL: CPT

## 2022-08-14 PROCEDURE — 36415 COLL VENOUS BLD VENIPUNCTURE: CPT

## 2022-08-14 PROCEDURE — 86901 BLOOD TYPING SEROLOGIC RH(D): CPT

## 2022-08-14 PROCEDURE — 85025 COMPLETE CBC W/AUTO DIFF WBC: CPT

## 2022-08-14 PROCEDURE — 84145 PROCALCITONIN (PCT): CPT

## 2022-08-14 PROCEDURE — 71045 X-RAY EXAM CHEST 1 VIEW: CPT

## 2022-08-14 PROCEDURE — 36600 WITHDRAWAL OF ARTERIAL BLOOD: CPT

## 2022-08-14 PROCEDURE — C1900: CPT

## 2022-08-14 PROCEDURE — 82803 BLOOD GASES ANY COMBINATION: CPT

## 2022-08-14 PROCEDURE — 33208 INSRT HEART PM ATRIAL & VENT: CPT

## 2022-08-14 PROCEDURE — 82607 VITAMIN B-12: CPT

## 2022-08-14 PROCEDURE — 33225 L VENTRIC PACING LEAD ADD-ON: CPT

## 2022-08-14 PROCEDURE — 93306 TTE W/DOPPLER COMPLETE: CPT

## 2022-08-14 PROCEDURE — 84100 ASSAY OF PHOSPHORUS: CPT

## 2022-08-14 PROCEDURE — 86900 BLOOD TYPING SEROLOGIC ABO: CPT

## 2022-08-14 PROCEDURE — C1887: CPT

## 2022-08-14 PROCEDURE — 99291 CRITICAL CARE FIRST HOUR: CPT

## 2022-08-14 PROCEDURE — C2621: CPT

## 2022-08-14 PROCEDURE — 93010 ELECTROCARDIOGRAM REPORT: CPT

## 2022-08-14 PROCEDURE — 85027 COMPLETE CBC AUTOMATED: CPT

## 2022-08-14 PROCEDURE — 86618 LYME DISEASE ANTIBODY: CPT

## 2022-08-14 PROCEDURE — C1894: CPT

## 2022-08-14 PROCEDURE — 83036 HEMOGLOBIN GLYCOSYLATED A1C: CPT

## 2022-08-14 PROCEDURE — C1760: CPT

## 2022-08-14 RX ORDER — ASPIRIN/CALCIUM CARB/MAGNESIUM 324 MG
324 TABLET ORAL ONCE
Refills: 0 | Status: COMPLETED | OUTPATIENT
Start: 2022-08-14 | End: 2022-08-14

## 2022-08-14 RX ORDER — ATORVASTATIN CALCIUM 80 MG/1
20 TABLET, FILM COATED ORAL AT BEDTIME
Refills: 0 | Status: DISCONTINUED | OUTPATIENT
Start: 2022-08-14 | End: 2022-08-14

## 2022-08-14 RX ORDER — HEPARIN SODIUM 5000 [USP'U]/ML
3800 INJECTION INTRAVENOUS; SUBCUTANEOUS EVERY 6 HOURS
Refills: 0 | Status: DISCONTINUED | OUTPATIENT
Start: 2022-08-14 | End: 2022-08-14

## 2022-08-14 RX ORDER — ONDANSETRON 8 MG/1
4 TABLET, FILM COATED ORAL EVERY 8 HOURS
Refills: 0 | Status: DISCONTINUED | OUTPATIENT
Start: 2022-08-14 | End: 2022-08-19

## 2022-08-14 RX ORDER — FUROSEMIDE 40 MG
20 TABLET ORAL
Refills: 0 | Status: DISCONTINUED | OUTPATIENT
Start: 2022-08-14 | End: 2022-08-18

## 2022-08-14 RX ORDER — FERROUS SULFATE 325(65) MG
0 TABLET ORAL
Qty: 0 | Refills: 0 | DISCHARGE

## 2022-08-14 RX ORDER — HEPARIN SODIUM 5000 [USP'U]/ML
4000 INJECTION INTRAVENOUS; SUBCUTANEOUS EVERY 6 HOURS
Refills: 0 | Status: DISCONTINUED | OUTPATIENT
Start: 2022-08-14 | End: 2022-08-15

## 2022-08-14 RX ORDER — CLOPIDOGREL BISULFATE 75 MG/1
300 TABLET, FILM COATED ORAL ONCE
Refills: 0 | Status: COMPLETED | OUTPATIENT
Start: 2022-08-14 | End: 2022-08-14

## 2022-08-14 RX ORDER — ALBUTEROL 90 UG/1
2 AEROSOL, METERED ORAL EVERY 6 HOURS
Refills: 0 | Status: DISCONTINUED | OUTPATIENT
Start: 2022-08-14 | End: 2022-08-19

## 2022-08-14 RX ORDER — MULTIVIT-MIN/FERROUS GLUCONATE 9 MG/15 ML
1 LIQUID (ML) ORAL
Qty: 0 | Refills: 0 | DISCHARGE

## 2022-08-14 RX ORDER — HEPARIN SODIUM 5000 [USP'U]/ML
4000 INJECTION INTRAVENOUS; SUBCUTANEOUS ONCE
Refills: 0 | Status: COMPLETED | OUTPATIENT
Start: 2022-08-14 | End: 2022-08-14

## 2022-08-14 RX ORDER — ACYCLOVIR SODIUM 500 MG
1 VIAL (EA) INTRAVENOUS
Qty: 0 | Refills: 0 | DISCHARGE

## 2022-08-14 RX ORDER — ATORVASTATIN CALCIUM 80 MG/1
40 TABLET, FILM COATED ORAL AT BEDTIME
Refills: 0 | Status: DISCONTINUED | OUTPATIENT
Start: 2022-08-14 | End: 2022-08-19

## 2022-08-14 RX ORDER — LEVOTHYROXINE SODIUM 125 MCG
1 TABLET ORAL
Qty: 0 | Refills: 0 | DISCHARGE

## 2022-08-14 RX ORDER — ASPIRIN/CALCIUM CARB/MAGNESIUM 324 MG
81 TABLET ORAL DAILY
Refills: 0 | Status: DISCONTINUED | OUTPATIENT
Start: 2022-08-15 | End: 2022-08-19

## 2022-08-14 RX ORDER — LEVOTHYROXINE SODIUM 125 MCG
100 TABLET ORAL DAILY
Refills: 0 | Status: DISCONTINUED | OUTPATIENT
Start: 2022-08-14 | End: 2022-08-19

## 2022-08-14 RX ORDER — LOSARTAN POTASSIUM 100 MG/1
1 TABLET, FILM COATED ORAL
Qty: 0 | Refills: 0 | DISCHARGE

## 2022-08-14 RX ORDER — NEBIVOLOL HYDROCHLORIDE 5 MG/1
1 TABLET ORAL
Qty: 0 | Refills: 0 | DISCHARGE

## 2022-08-14 RX ORDER — CALCIUM CARBONATE 500(1250)
2 TABLET ORAL
Qty: 0 | Refills: 0 | DISCHARGE

## 2022-08-14 RX ORDER — ACETAMINOPHEN 500 MG
650 TABLET ORAL EVERY 6 HOURS
Refills: 0 | Status: DISCONTINUED | OUTPATIENT
Start: 2022-08-14 | End: 2022-08-19

## 2022-08-14 RX ORDER — HEPARIN SODIUM 5000 [USP'U]/ML
3800 INJECTION INTRAVENOUS; SUBCUTANEOUS ONCE
Refills: 0 | Status: DISCONTINUED | OUTPATIENT
Start: 2022-08-14 | End: 2022-08-14

## 2022-08-14 RX ORDER — HEPARIN SODIUM 5000 [USP'U]/ML
5000 INJECTION INTRAVENOUS; SUBCUTANEOUS EVERY 8 HOURS
Refills: 0 | Status: DISCONTINUED | OUTPATIENT
Start: 2022-08-14 | End: 2022-08-14

## 2022-08-14 RX ORDER — CALCIUM CARBONATE 500(1250)
2 TABLET ORAL AT BEDTIME
Refills: 0 | Status: DISCONTINUED | OUTPATIENT
Start: 2022-08-14 | End: 2022-08-19

## 2022-08-14 RX ORDER — LOSARTAN POTASSIUM 100 MG/1
100 TABLET, FILM COATED ORAL DAILY
Refills: 0 | Status: DISCONTINUED | OUTPATIENT
Start: 2022-08-14 | End: 2022-08-15

## 2022-08-14 RX ORDER — ACYCLOVIR SODIUM 500 MG
400 VIAL (EA) INTRAVENOUS
Refills: 0 | Status: DISCONTINUED | OUTPATIENT
Start: 2022-08-14 | End: 2022-08-19

## 2022-08-14 RX ORDER — HEPARIN SODIUM 5000 [USP'U]/ML
INJECTION INTRAVENOUS; SUBCUTANEOUS
Qty: 25000 | Refills: 0 | Status: DISCONTINUED | OUTPATIENT
Start: 2022-08-14 | End: 2022-08-15

## 2022-08-14 RX ORDER — CLOPIDOGREL BISULFATE 75 MG/1
75 TABLET, FILM COATED ORAL DAILY
Refills: 0 | Status: DISCONTINUED | OUTPATIENT
Start: 2022-08-15 | End: 2022-08-16

## 2022-08-14 RX ORDER — DEXAMETHASONE 0.5 MG/5ML
20 ELIXIR ORAL
Qty: 0 | Refills: 0 | DISCHARGE

## 2022-08-14 RX ORDER — FERROUS SULFATE 325(65) MG
325 TABLET ORAL DAILY
Refills: 0 | Status: DISCONTINUED | OUTPATIENT
Start: 2022-08-14 | End: 2022-08-19

## 2022-08-14 RX ORDER — HEPARIN SODIUM 5000 [USP'U]/ML
INJECTION INTRAVENOUS; SUBCUTANEOUS
Qty: 25000 | Refills: 0 | Status: DISCONTINUED | OUTPATIENT
Start: 2022-08-14 | End: 2022-08-14

## 2022-08-14 RX ORDER — PIPERACILLIN AND TAZOBACTAM 4; .5 G/20ML; G/20ML
3.38 INJECTION, POWDER, LYOPHILIZED, FOR SOLUTION INTRAVENOUS EVERY 8 HOURS
Refills: 0 | Status: DISCONTINUED | OUTPATIENT
Start: 2022-08-14 | End: 2022-08-14

## 2022-08-14 RX ADMIN — Medication 20 MILLIGRAM(S): at 06:09

## 2022-08-14 RX ADMIN — CLOPIDOGREL BISULFATE 300 MILLIGRAM(S): 75 TABLET, FILM COATED ORAL at 10:24

## 2022-08-14 RX ADMIN — Medication 1 INCH(S): at 00:21

## 2022-08-14 RX ADMIN — ATORVASTATIN CALCIUM 40 MILLIGRAM(S): 80 TABLET, FILM COATED ORAL at 21:10

## 2022-08-14 RX ADMIN — Medication 100 MICROGRAM(S): at 06:09

## 2022-08-14 RX ADMIN — Medication 40 MILLIGRAM(S): at 00:03

## 2022-08-14 RX ADMIN — Medication 250 MILLIGRAM(S): at 00:24

## 2022-08-14 RX ADMIN — Medication 400 MILLIGRAM(S): at 21:11

## 2022-08-14 RX ADMIN — HEPARIN SODIUM 900 UNIT(S)/HR: 5000 INJECTION INTRAVENOUS; SUBCUTANEOUS at 11:43

## 2022-08-14 RX ADMIN — HEPARIN SODIUM 0 UNIT(S)/HR: 5000 INJECTION INTRAVENOUS; SUBCUTANEOUS at 18:32

## 2022-08-14 RX ADMIN — Medication 1200 MILLIGRAM(S): at 00:36

## 2022-08-14 RX ADMIN — Medication 2 TABLET(S): at 21:10

## 2022-08-14 RX ADMIN — Medication 1 INCH(S): at 11:08

## 2022-08-14 RX ADMIN — Medication 325 MILLIGRAM(S): at 10:24

## 2022-08-14 RX ADMIN — Medication 1000 MILLIGRAM(S): at 05:30

## 2022-08-14 RX ADMIN — PIPERACILLIN AND TAZOBACTAM 200 GRAM(S): 4; .5 INJECTION, POWDER, LYOPHILIZED, FOR SOLUTION INTRAVENOUS at 00:04

## 2022-08-14 RX ADMIN — Medication 324 MILLIGRAM(S): at 00:50

## 2022-08-14 RX ADMIN — PIPERACILLIN AND TAZOBACTAM 3.38 GRAM(S): 4; .5 INJECTION, POWDER, LYOPHILIZED, FOR SOLUTION INTRAVENOUS at 00:16

## 2022-08-14 RX ADMIN — HEPARIN SODIUM 5000 UNIT(S): 5000 INJECTION INTRAVENOUS; SUBCUTANEOUS at 06:09

## 2022-08-14 RX ADMIN — Medication 400 MILLIGRAM(S): at 11:47

## 2022-08-14 RX ADMIN — Medication 324 MILLIGRAM(S): at 10:24

## 2022-08-14 RX ADMIN — HEPARIN SODIUM 4000 UNIT(S): 5000 INJECTION INTRAVENOUS; SUBCUTANEOUS at 11:47

## 2022-08-14 NOTE — CONSULT NOTE ADULT - ASSESSMENT
87 year old female w hx Waldenstrom disease (NHL) on calquence,, CAD, HLD, HTN, hypothyroid  came to ED by family c/o difficulty breathing and pain to arms and back Pt reported to ED that /76 w HR 90 She usually has a slow heart rate and was taken off bystolic on Tuesday last week but was told to take 1 dose last night pt and daughter denied hx of CHF c/o bilateral arm pain L more than R, intermittent w radiation to upper back could not grade it for me and could not tell me what made it better or what made it worse denied fever or chills denied sick contacts +nausea no vomiting or abdominal pain In ED /71  HR 89   RR 19-30    84% sat RA placed on NRB and then BiPAP CTA no PE  + pulm edema   CCM consulted in ED as well as tele ICU BiPA and echo advised but ICU care not required, given IV abx d/t concern for superimposed pna.     1. acute respiratory failure. pulmonary edema. leukocytosis -resolved. chf  - no evidence of pneumonia  - dc antibiotics/observe  - cardiology following  - monitor temps  - supportive care  - fu cbc

## 2022-08-14 NOTE — CONSULT NOTE ADULT - ASSESSMENT
87 year old female w hx Waldenstrom disease (NHL) on calquence,, CAD, HLD, HTN, hypothyroid  came to ED by family c/o difficulty breathing and pain to arms and back   NSTEMI and CHB  -cont dapt, heparin  -follow with interventional team for LHC, will need TVP via right groin. Pt is HD stable and asymptomatic, stable junctional rhythm no indication for emergent TVP at this time, likely CHB will resolve after revascularization  -have pacing pads on, if pt has profound bradycardia start dopamine 5-10mcg/kg/min call EP  -if pt has cp or hypotension call cardiology activate stemi code   -will follow     Thank you for involving our service in participating in the care of this patient

## 2022-08-14 NOTE — CONSULT NOTE ADULT - ASSESSMENT
86 yo F with above PMHx who presents because feeling unwell and SOB found to be in acute hypoxemic resp failure 2/2 acute CHF exacerbation from NSTEMI, now in complete heart block.    -Discussed case with Dr. Tong (primary on case), Dr. Mena (Interventionalist on call), Dr. Sharp (EP on call) and Oncology (Dr. Beck)  -Concerned her hypoxemia and CHB are ischemia, so I started on ASA and palvix (gave load today and ordered daily dosing for tomorrow) and placed on heparin gtt.  -With CHB will hold off on BB or any other AV kai blockers.  -Recommend pacer pads at bedside  -Will intensify statin to 40  -Low threshold to activate cath lab/C-Port  -Dr. Sharp and Dr. Mena plan to see patient as well.  -Dr. Beck reassured patient that these cardiac medications are needed. Her chemo will be on hold since it can affect her heart.  -Keep patient NPO except for small sips of water - If Dr. Mena wants to wait until tomorrow for LHC than ok to give her some light food - maybe even just keep it liquids or at least minimal and make sure she is NPO after MN except medications.

## 2022-08-14 NOTE — H&P ADULT - GENERAL
"Requested Prescriptions   Pending Prescriptions Disp Refills     atorvastatin (LIPITOR) 20 MG tablet [Pharmacy Med Name: Atorvastatin Calcium Oral Tablet 20 MG] 90 tablet 0     Sig: Take 1 tablet (20 mg) by mouth daily  Last Written Prescription Date:  03/04/19  Last Fill Quantity: 90,  # refills: 1   Last office visit: 6/24/2019 with prescribing provider:  06/24/19   Future Office Visit: 09/23/19  Next 5 appointments (look out 90 days)    Sep 23, 2019 11:40 AM CDT  Office Visit with Braxton Boone MD  [facility] ([facility]) [address] [id number] [phone]              Statins Protocol Passed - 8/20/2019 11:41 AM        Passed - LDL on file in past 12 months     Recent Labs   Lab Test 06/20/19  0902   LDL 65             Passed - No abnormal creatine kinase in past 12 months     No lab results found.             Passed - Recent (12 mo) or future (30 days) visit within the authorizing provider's specialty     Patient had office visit in the last 12 months or has a visit in the next 30 days with authorizing provider or within the authorizing provider's specialty.  See \"Patient Info\" tab in inbasket, or \"Choose Columns\" in Meds & Orders section of the refill encounter.              Passed - Medication is active on med list        Passed - Patient is age 18 or older        Passed - No active pregnancy on record        Passed - No positive pregnancy test in past 12 months        pioglitazone (ACTOS) 30 MG tablet [Pharmacy Med Name: Pioglitazone HCl Oral Tablet 30 MG] 90 tablet 0     Sig: Take 1 tablet (30 mg) by mouth daily  Last Written Prescription Date:  03/04/19  Last Fill Quantity: 90,  # refills: 1   Last office visit: 6/24/2019 with prescribing provider:  06/24/19   Future Office Visit: 09/23/19  Next 5 appointments (look out 90 days)    Sep 23, 2019 11:40 AM CDT  Office Visit with Braxton Boone MD  [facility] " "[facility] ([facility]) [address] [phone] [phone]              Thiazolidinedione Agents (TZDs)  Failed - 8/20/2019 11:41 AM        Failed - Patient has a normal ALT within the past 12 mos.     Recent Labs   Lab Test 02/09/18  0945   ALT 16             Failed - Patient has a normal AST within the past 12 mos.      Recent Labs   Lab Test 02/02/13  0939   AST 40             Failed - Patient has a normal serum Creatinine in the past 12 months     Recent Labs   Lab Test 06/20/19  0902   CR 1.21*             Passed - Blood pressure less than 140/90 in past 6 months     BP Readings from Last 3 Encounters:   06/24/19 120/56   04/29/19 112/52   03/04/19 116/54                 Passed - Patient has documented LDL within the past 12 mos.     Recent Labs   Lab Test 06/20/19  0902   LDL 65             Passed - Patient has had a Microalbumin in the past 12 mos.     Recent Labs   Lab Test 02/28/19  0922   MICROL 125   UMALCR 88.03*             Passed - Patient has documented A1c within the specified period of time.     If HgbA1C is 8 or greater, it needs to be on file within the past 3 months.  If less than 8, must be on file within the past 6 months.     Recent Labs   Lab Test 06/20/19  0902   A1C 8.0*             Passed - Diagnosis not CHF        Passed - Medication is active on med list        Passed - Patient is age 18 or older        Passed - Patient is not pregnant        Passed - Patient has not had a positive pregnancy test within the past 12 mos.        Passed - Recent (6 mo) or future (30 days) visit within the authorizing provider's specialty     Patient had office visit in the last 6 months or has a visit in the next 30 days with authorizing provider or within the authorizing provider's specialty.  See \"Patient Info\" tab in inbasket, or \"Choose Columns\" in Meds & Orders section of the refill encounter.            " details…

## 2022-08-14 NOTE — PATIENT PROFILE ADULT - FUNCTIONAL ASSESSMENT - DAILY ACTIVITY 1.
3 = A little assistance
Airway patent, Nasal mucosa clear. Mouth with normal mucosa. Throat has no vesicles, no oropharyngeal exudates and uvula is midline.

## 2022-08-14 NOTE — H&P ADULT - HISTORY OF PRESENT ILLNESS
87 year old female w hx Waldenstrom disease (NHL) on calquence,, CAD, HLD, HTN, hypothyroid  came to ED by EMS c/o difficulty breathing and pain to arms and back     Pt reported to ED that /76 w HR 90    In ED /71  HR 89   RR 19-30    84% sat RA 87 year old female w hx Waldenstrom disease (NHL) on calquence,, CAD, HLD, HTN, hypothyroid  came to ED by family c/o difficulty breathing and pain to arms and back     Pt reported to ED that /76 w HR 90  She usually has a slow heart rate and was taken off bystolic on Tuesday last week but was told to take 1 dose last night  pt and daughter denied hx of CHF      In ED /71  HR 89   RR 19-30    84% sat RA  placed on NRB and then BiPAP  CTA no PE  + pulm edema cannot rule out superimposed PNA  CCM consulted in ED as well as tele ICU  BiPA and echo advised but ICU care not required      PAST MEDICAL HX  CAD (coronary artery disease)   HLD (hyperlipidemia)   HTN hypertension   Hypothyroidism   Waldenstrom's disease.     PAST SURGICAL HX  Upper endoscopy for heartburn + H pylori years ago that was treated  Colonoscopy  S/P hysterectomy.     FAMILY HX  No pertinent family history in first degree relatives. HTN, DM II      SOCIAL HX  , lives w   never smoker  no alcohol  usually independent w some assistance by daughter   87 year old female w hx Waldenstrom disease (NHL) on calquence,, CAD, HLD, HTN, hypothyroid  came to ED by family c/o difficulty breathing and pain to arms and back     Pt reported to ED that /76 w HR 90  She usually has a slow heart rate and was taken off bystolic on Tuesday last week but was told to take 1 dose last night  pt and daughter denied hx of CHF  c/o bilateral arm pain L more than R, intermittent w radiation to upper back  could not grade it for me and could not tell me what made it better or what made it worse  denied fever or chills  denied sick contacts  +nausea  no vomiting or abdominal pain      In ED /71  HR 89   RR 19-30    84% sat RA  placed on NRB and then BiPAP  CTA no PE  + pulm edema cannot rule out superimposed PNA  CCM consulted in ED as well as tele ICU  BiPA and echo advised but ICU care not required      PAST MEDICAL HX  CAD (coronary artery disease)   HLD (hyperlipidemia)   HTN hypertension   Hypothyroidism   Waldenstrom's disease.     PAST SURGICAL HX  Upper endoscopy for heartburn + H pylori years ago that was treated  Colonoscopy  S/P hysterectomy.     FAMILY HX  No pertinent family history in first degree relatives. HTN, DM II      SOCIAL HX  , lives w   never smoker  no alcohol  usually independent w some assistance by daughter   87 year old female w hx Waldenstrom disease (NHL) on calquence,, CAD, HLD, HTN, hypothyroid  came to ED by family c/o difficulty breathing and pain to arms and back     Pt reported to ED that /76 w HR 90  She usually has a slow heart rate and was taken off bystolic on Tuesday last week but was told to take 1 dose last night  pt and daughter denied hx of CHF  c/o bilateral arm pain L more than R, intermittent w radiation to upper back  could not grade it for me and could not tell me what made it better or what made it worse  denied fever or chills  denied sick contacts  +nausea  no vomiting or abdominal pain      In ED /71  HR 89   RR 19-30    84% sat RA  placed on NRB and then BiPAP  CTA no PE  + pulm edema asked to eval re  superimposed PNA  CCM consulted in ED as well as tele ICU  BiPA and echo advised but ICU care not required      PAST MEDICAL HX  CAD (coronary artery disease)   HLD (hyperlipidemia)   HTN hypertension   Hypothyroidism   Waldenstrom's disease.     PAST SURGICAL HX  Upper endoscopy for heartburn + H pylori years ago that was treated  Colonoscopy  S/P hysterectomy.     FAMILY HX  No pertinent family history in first degree relatives. HTN, DM II      SOCIAL HX  , lives w   never smoker  no alcohol  usually independent w some assistance by daughter

## 2022-08-14 NOTE — CONSULT NOTE ADULT - ASSESSMENT
Impression - Patient is tolerating BiPap well, speaking in full sentences, AAOx3, and states her breathing has improved since being placed on BiPAP. Patient denies smoking history, but  is lifelong smoker w/COPD so possible chronic lung disease 2/2 prolonged secondhand smoke exposure.  1. SOB  2. CHF?  3. PNA?  4. ILD    Neuro - AAOx3, mentating at baseline, answering questions and following commands appropriately, PRN analgesia per primary team  CV - Normotensive during consult, continue outpatient HTN regimen with appropriate parameters, continue statin therapy, trend lytes, replete as needed, both patient and daughter at bedside deny PMHx of CHF - consider stat TTE and cardiology consult in AM, IV lasix 40mg x 1 in ED, recommend Lasix 20mg IVP q/6hrs  Pulm - SPO2 low 90s on BiPAP, continue to titrate as needed, CTA negative for PE, but B/L diffuse ground glass opacities and edema, PNA? ILD?, recommend pulm consult for further evaluation, standing Zosyn, PRN albuterol  GI - NPO while on BiPAP  Renal - BONNIE, strict I/Os, consider schaefer, trend bmp, avoid nephrotoxic agents  Heme -  DVT PPx per primary team  ID - Leukocytosis, trend cbc, afebrile, possible PNA on standing Zosyn, BCx pending, consider ID consult      Patient does not require ICU level of care at this time. Please re-consult as needed. Case discussed with EICU attending Dr Steele. Impression - Patient is tolerating BiPap well, speaking in full sentences, AAOx3, and states her breathing has improved since being placed on BiPAP. Patient denies smoking history, but  is lifelong smoker w/COPD so possible chronic lung disease 2/2 prolonged secondhand smoke exposure.  1. SOB  2. CHF?  3. PNA?  4. ILD    Neuro - AAOx3, mentating at baseline, answering questions and following commands appropriately, PRN analgesia per primary team  CV - Normotensive during consult, continue outpatient HTN regimen with appropriate parameters, continue statin therapy, trend lytes, replete as needed, trend troponin, both patient and daughter at bedside deny PMHx of CHF - consider stat TTE and cardiology consult in AM, IV lasix 40mg x 1 in ED, recommend Lasix 20mg IVP q/6hrs  Pulm - SPO2 low 90s on BiPAP, continue to titrate as needed, CTA negative for PE, but B/L diffuse ground glass opacities and edema, PNA? ILD?, recommend pulm consult for further evaluation, standing Zosyn, PRN albuterol  GI - NPO while on BiPAP  Renal - BONNIE, strict I/Os, consider schaefer, trend bmp, avoid nephrotoxic agents  Heme -  DVT PPx per primary team  ID - Leukocytosis, trend cbc, afebrile, possible PNA on standing Zosyn, BCx pending, consider ID consult      Patient does not require ICU level of care at this time. Please re-consult as needed. Case discussed with EICU attending Dr Steele. Impression - Patient is tolerating BiPap well, speaking in full sentences, AAOx3, and states her breathing has improved since being placed on BiPAP. Patient denies smoking history, but  is lifelong smoker w/COPD so possible chronic lung disease 2/2 prolonged secondhand smoke exposure.  1. SOB  2. CHF?  3. PNA?  4. ILD    Neuro - AAOx3, mentating at baseline, answering questions and following commands appropriately, PRN analgesia per primary team  CV - Normotensive during consult, continue outpatient HTN regimen with appropriate parameters, continue statin therapy, trend lytes, replete as needed, trend troponin, both patient and daughter at bedside deny PMHx of CHF - consider stat TTE and cardiology consult in AM, IV lasix 40mg x 1 in ED, recommend Lasix 20mg IVP q/6hrs  Pulm - SPO2 low 90s on BiPAP, continue to titrate as needed, CTA negative for PE, but B/L diffuse ground glass opacities and edema, PNA? ILD?, recommend pulm consult for further evaluation, standing Zosyn, PRN albuterol, trend ABG  GI - NPO while on BiPAP  Renal - BONNIE, strict I/Os, consider schaefer, trend bmp, avoid nephrotoxic agents  Heme -  DVT PPx per primary team  ID - Leukocytosis, trend cbc, afebrile, possible PNA on standing Zosyn, BCx pending, consider ID consult      Patient does not require ICU level of care at this time. Please re-consult as needed. Case discussed with EICU attending Dr Steele.

## 2022-08-14 NOTE — CONSULT NOTE ADULT - REASON FOR ADMISSION
Acute hypoxic resp failure  Acute pulmonary edema

## 2022-08-14 NOTE — H&P ADULT - NSHPLABSRESULTS_GEN_ALL_CORE
INTERPRETATION:  CLINICAL INFORMATION: 87 years old. Female. hypoxic, SOB.    COMPARISON: None.    PROCEDURE:  IV Contrast: Omnipaque 350 / 90 cc administered / 10 cc discarded.  Oral Contrast: NONE  Complications: None reported at time of study completion    PROCEDURE:  CT Angiography of the Chest. Sagittal and coronal reformats were   performed as well as 3D (MIP) reconstructions.    FINDINGS:    LUNGS AND AIRWAYS: Patent central airways. Diffuse septal thickening,   with patchy groundglass and denser consolidative opacities bilaterally in   all lobes..  PLEURA: Small bilateral pleural effusions.  MEDIASTINUM AND KRISTINE: Ill-defined 3.4 x 2.0 cm subcarinal adenopathy.  VESSELS: No evidence of pulmonary embolism.  HEART: Cardiomegaly. No pericardial effusion. Coronary artery   calcifications.  CHEST WALL AND LOWER NECK: Within normal limits.  VISUALIZED UPPER ABDOMEN: Grossly unremarkable.  BONES: Degenerative changes in the spine. Severe chronic T8, T10   compression deformities, and moderate anterior wedge compression   deformity of L1. Old right clavicle fracture deformity. Old right-sided   rib fractures.    IMPRESSION:    No CT evidence of pulmonary embolism.    Findings suggest pulmonary edema with small bilateral pleural effusions;   correlate clinically for superimposed infection.    Ill-defined subcarinal adenopathy probably related to history of lymphoma.    --- End of Report ---

## 2022-08-14 NOTE — CONSULT NOTE ADULT - SUBJECTIVE AND OBJECTIVE BOX
HPI:  87 year old female w hx Waldenstrom disease (NHL) on calquence,, CAD, HLD, HTN, hypothyroid  came to ED by family c/o difficulty breathing and pain to arms and back     Pt reported to ED that /76 w HR 90  She usually has a slow heart rate and was taken off bystolic on Tuesday last week but was told to take 1 dose last night  pt and daughter denied hx of CHF- follows with Dr. Bailon at [facility]   c/o bilateral arm pain L more than R, intermittent w radiation to upper back    In ED /71  HR 89   RR 19-30    84% sat RA  placed on NRB and then BiPAP  CTA no PE  + pulm edema asked to eval re  superimposed PNA  CCM consulted in ED as well as tele ICU  BiPA and echo advised - pt now making troponins, with LBBB on tele and concern for NSTEMI    Awaiting records to be sent from [facility]      PAST MEDICAL HX  CAD (coronary artery disease)   HLD (hyperlipidemia)   HTN hypertension   Hypothyroidism   Waldenstrom's disease.     PAST SURGICAL HX  Upper endoscopy for heartburn + H pylori years ago that was treated  Colonoscopy  S/P hysterectomy.     FAMILY HX  No pertinent family history in first degree relatives. HTN, DM II      SOCIAL HX  , lives w   never smoker  no alcohol  usually independent w some assistance by daughter   (14 Aug 2022 05:29)      PAST MEDICAL & SURGICAL HISTORY:  Hypertension      Waldenstrom&#x27;s disease      HLD (hyperlipidemia)      CAD (coronary artery disease)      Hypothyroidism      S/P hysterectomy          Allergies    No Known Allergies    Intolerances        MEDICATIONS  (STANDING):  acyclovir   Oral Tab/Cap 400 milliGRAM(s) Oral two times a day  aspirin  chewable 324 milliGRAM(s) Oral once  atorvastatin 20 milliGRAM(s) Oral at bedtime  calcium carbonate    500 mG (Tums) Chewable 2 Tablet(s) Chew at bedtime  clopidogrel Tablet 300 milliGRAM(s) Oral once  ferrous    sulfate 325 milliGRAM(s) Oral daily  furosemide   Injectable 20 milliGRAM(s) IV Push two times a day  heparin   Injectable 5000 Unit(s) SubCutaneous every 8 hours  heparin   Injectable 3800 Unit(s) IV Push once  heparin  Infusion.  Unit(s)/Hr (7.5 mL/Hr) IV Continuous <Continuous>  levothyroxine 100 MICROGram(s) Oral daily  losartan 100 milliGRAM(s) Oral daily  piperacillin/tazobactam IVPB.. 3.375 Gram(s) IV Intermittent every 8 hours    MEDICATIONS  (PRN):  acetaminophen     Tablet .. 650 milliGRAM(s) Oral every 6 hours PRN Temp greater or equal to 38C (100.4F), Mild Pain (1 - 3)  ALBUTerol    90 MICROgram(s) HFA Inhaler 2 Puff(s) Inhalation every 6 hours PRN Shortness of Breath and/or Wheezing  aluminum hydroxide/magnesium hydroxide/simethicone Suspension 30 milliLiter(s) Oral every 4 hours PRN Dyspepsia  heparin   Injectable 3800 Unit(s) IV Push every 6 hours PRN For aPTT less than 40  ondansetron Injectable 4 milliGRAM(s) IV Push every 8 hours PRN Nausea and/or Vomiting      FAMILY HISTORY:  No pertinent family history in first degree relatives        SOCIAL HISTORY: No EtOH, no tobacco    REVIEW OF SYSTEMS:    CONSTITUTIONAL: + weakness, no fevers or chills  EYES/ENT: No visual changes;  No vertigo or throat pain   NECK: No pain or stiffness  RESPIRATORY: No cough, wheezing, hemoptysis; + shortness of breath  CARDIOVASCULAR: No chest pain or palpitations  GASTROINTESTINAL: No abdominal or epigastric pain. No nausea, vomiting, or hematemesis; No diarrhea or constipation. No melena or hematochezia.  GENITOURINARY: No dysuria, frequency or hematuria  NEUROLOGICAL: No numbness or weakness  SKIN: No itching, burning, rashes, or lesions   All other review of systems is negative unless indicated above.    Height (cm): 156.2 (08-13 @ 22:48)  Weight (kg): 63.5 (08-13 @ 22:48)  BMI (kg/m2): 26 (08-13 @ 22:48)  BSA (m2): 1.63 (08-13 @ 22:48)    T(F): 97.8 (08-14-22 @ 06:42), Max: 97.8 (08-14-22 @ 06:42)  HR: 74 (08-14-22 @ 07:00)  BP: 147/87 (08-14-22 @ 06:42)  RR: 15 (08-14-22 @ 06:42)  SpO2: 99% (08-14-22 @ 07:00)  Wt(kg): --    GENERAL: NAD, on 5L NC  HEAD:  Atraumatic,   EYES: EOMI,   CHEST/LUNG: poor inspiratory effort, tachypneic on NC  HEART: bradycardic  ABDOMEN: Soft, Nontender,   EXTREMITIES:  no edema   NEUROLOGY: non-focal  SKIN: No rashes or lesions                          11.1   9.67  )-----------( 222      ( 14 Aug 2022 08:07 )             33.6       08-14    140  |  108  |  50<H>  ----------------------------<  124<H>  4.1   |  26  |  1.28    Ca    9.8      14 Aug 2022 08:07  Phos  3.3     08-14  Mg     2.1     08-14    TPro  7.4  /  Alb  4.0  /  TBili  0.5  /  DBili  x   /  AST  16  /  ALT  51  /  AlkPhos  79  08-13      Magnesium, Serum: 2.1 mg/dL (08-14 @ 08:07)  Phosphorus Level, Serum: 3.3 mg/dL (08-14 @ 08:07)  Magnesium, Serum: 2.1 mg/dL (08-13 @ 23:11)      PT/INR - ( 13 Aug 2022 23:11 )   PT: 12.9 sec;   INR: 1.11 ratio         PTT - ( 13 Aug 2022 23:11 )  PTT:24.8 sec    
REASON FOR CONSULT: SOB    CONSULT REQUESTED BY: Dr Pablo    Patient is a 87y old  Female who presents with a chief complaint of SOB    BRIEF HOSPITAL COURSE: Patient is a 86 y/o female with a PMHx of HTN, Waldenstrom's lymphoma (NHL) on Calquence 100mg qd, hypothyroidism, hyperlipidemia, CHF? presents with difficulty breathing x 1day and pain in bilateral arms and upper back x 2 days.  Patient endorses chronic wet cough, but denies recent illness, sick contact, fever, chills, nausea, abdominal pain or vomiting.  Daughter went to house and patient complained of feeling dizzy, being unable to breathe and feeling like she had to use the bathroom.  No dysuria or hematuria.  Loose stool but denies diarrhea. Daughter states earlier this week patient had bystolic removed from med list due to asymptomatic bradycardia and now HR and blood pressure has been elevated for 2 days.    Events last 24 hours: Patient's SPO2 low 80s on NRB and placed on BiPaP. Patient found to have elevated WBC 13.4, BONNIE, elevated BNP 14k and troponin. CTA did not reveal evidence of PE, but patient does have B/L diffuse septal thickening with patchy ground glass and denser consolidative opacities in all lobes. Patient given IVP Lasix 40mg and empiric vanco and zosyn.     PAST MEDICAL & SURGICAL HISTORY:  Hypertension      Waldenstrom&#x27;s disease      HLD (hyperlipidemia)      CAD (coronary artery disease)      Hypothyroidism      S/P hysterectomy        Allergies    No Known Allergies    Intolerances      FAMILY HISTORY:  No pertinent family history in first degree relatives        Review of Systems: Remainder of ROS negative, except as stated in HPI      Medications:        ICU Vital Signs Last 24 Hrs  T(C): 36.4 (13 Aug 2022 22:54), Max: 36.4 (13 Aug 2022 22:54)  T(F): 97.5 (13 Aug 2022 22:54), Max: 97.5 (13 Aug 2022 22:54)  HR: 54 (14 Aug 2022 01:25) (54 - 102)  BP: 135/66 (14 Aug 2022 01:25) (135/66 - 176/71)  BP(mean): 91 (13 Aug 2022 22:54) (91 - 91)  ABP: --  ABP(mean): --  RR: 17 (14 Aug 2022 01:25) (17 - 30)  SpO2: 92% (14 Aug 2022 01:25) (83% - 96%)    O2 Parameters below as of 14 Aug 2022 01:25  Patient On (Oxygen Delivery Method): BiPAP/CPAP          Vital Signs Last 24 Hrs  T(C): 36.4 (13 Aug 2022 22:54), Max: 36.4 (13 Aug 2022 22:54)  T(F): 97.5 (13 Aug 2022 22:54), Max: 97.5 (13 Aug 2022 22:54)  HR: 54 (14 Aug 2022 01:25) (54 - 102)  BP: 135/66 (14 Aug 2022 01:25) (135/66 - 176/71)  BP(mean): 91 (13 Aug 2022 22:54) (91 - 91)  RR: 17 (14 Aug 2022 01:25) (17 - 30)  SpO2: 92% (14 Aug 2022 01:25) (83% - 96%)    Parameters below as of 14 Aug 2022 01:25  Patient On (Oxygen Delivery Method): BiPAP/CPAP            I&O's Detail        LABS:                        11.4   13.43 )-----------( 257      ( 13 Aug 2022 23:11 )             35.6     08-13    141  |  110<H>  |  45<H>  ----------------------------<  167<H>  4.5   |  25  |  1.26    Ca    10.0      13 Aug 2022 23:11  Mg     2.1     08-13    TPro  7.4  /  Alb  4.0  /  TBili  0.5  /  DBili  x   /  AST  16  /  ALT  51  /  AlkPhos  79  08-13          CAPILLARY BLOOD GLUCOSE        PT/INR - ( 13 Aug 2022 23:11 )   PT: 12.9 sec;   INR: 1.11 ratio         PTT - ( 13 Aug 2022 23:11 )  PTT:24.8 sec    CULTURES:      Physical Examination:    General: Sitting up in stretcher, no acute distress.  Alert, oriented, interactive, nonfocal, BiPaP mask in place     HEENT: Atraumatic, normocephalic, EOMs intact, pupils equal, reactive to light.  Symmetric.    PULM: Diminished breath sounds B/L w/diffuse rhonchi    CVS: Regular rate and rhythm, no murmurs, rubs, or gallops    ABD: Soft, nondistended, nontender, no rebound or guarding, normoactive bowel sounds, no masses    EXT: Mild B/L lower extremity edema, nontender, PIVs    SKIN: Warm and well perfused    RADIOLOGY:   < from: CT Angio Chest PE Protocol w/ IV Cont (08.13.22 @ 23:53) >  ACC: [id number] EXAM:  CT ANGIO CHEST PULM ART [facility]                          PROCEDURE DATE:  08/13/2022          INTERPRETATION:  CLINICAL INFORMATION: 87 years old. Female. hypoxic, SOB.    COMPARISON: None.    PROCEDURE:  IV Contrast: Omnipaque 350 / 90 cc administered / 10 cc discarded.  Oral Contrast: NONE  Complications: None reported at time of study completion    PROCEDURE:  CT Angiography of the Chest. Sagittal and coronal reformats were   performed as well as 3D (MIP) reconstructions.    FINDINGS:    LUNGS AND AIRWAYS: Patent central airways. Diffuse septal thickening,   with patchy groundglass and denser consolidative opacities bilaterally in   all lobes..  PLEURA: Small bilateral pleural effusions.  MEDIASTINUM AND KRISTINE: Ill-defined 3.4 x 2.0 cm subcarinal adenopathy.  VESSELS: No evidence of pulmonary embolism.  HEART: Cardiomegaly. No pericardial effusion. Coronary artery   calcifications.  CHEST WALL AND LOWER NECK: Within normal limits.  VISUALIZED UPPER ABDOMEN: Grossly unremarkable.  BONES: Degenerative changes in the spine. Severe chronic T8, T10   compression deformities, and moderate anterior wedge compression   deformity of L1. Old right clavicle fracture deformity. Old right-sided   rib fractures.    IMPRESSION:    No CT evidence of pulmonary embolism.    Findings suggest pulmonary edema with small bilateral pleural effusions;   correlate clinically for superimposed infection.    Ill-defined subcarinal adenopathy probably related to history of lymphoma.    --- End of Report ---    AMANDA COATES MD; Attending Radiologist  This document has been electronically signed. Aug 14 2022 12:14AM    
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,, CAD, HLD, HTN, hypothyroid  came to ED by family c/o difficulty breathing and pain to arms and back Pt reported to ED that /76 w HR 90 She usually has a slow heart rate and was taken off bystolic on Tuesday last week but was told to take 1 dose last night pt and daughter denied hx of CHF c/o bilateral arm pain L more than R, intermittent w radiation to upper back could not grade it for me and could not tell me what made it better or what made it worse denied fever or chills denied sick contacts +nausea no vomiting or abdominal pain In ED /71  HR 89   RR 19-30    84% sat RA placed on NRB and then BiPAP CTA no PE  + pulm edema   CCM consulted in ED as well as tele ICU BiPA and echo advised but ICU care not required, given IV abx d/t concern for superimposed pna.       PAST MEDICAL HX  CAD (coronary artery disease)   HLD (hyperlipidemia)   HTN hypertension   Hypothyroidism   Waldenstrom's disease.     PAST SURGICAL HX  Upper endoscopy for heartburn + H pylori years ago that was treated  Colonoscopy  S/P hysterectomy.     Meds: per reconciliation sheet, noted below  MEDICATIONS  (STANDING):  acyclovir   Oral Tab/Cap 400 milliGRAM(s) Oral two times a day  atorvastatin 20 milliGRAM(s) Oral at bedtime  calcium carbonate    500 mG (Tums) Chewable 2 Tablet(s) Chew at bedtime  ferrous    sulfate 325 milliGRAM(s) Oral daily  furosemide   Injectable 20 milliGRAM(s) IV Push two times a day  heparin  Infusion.  Unit(s)/Hr (9 mL/Hr) IV Continuous <Continuous>  levothyroxine 100 MICROGram(s) Oral daily  losartan 100 milliGRAM(s) Oral daily    Allergies    No Known Allergies    Intolerances      Social: no smoking, no alcohol, no illegal drugs; no recent travel, no exposure to TB  FAMILY HISTORY:  No pertinent family history in first degree relatives       no history of premature cardiovascular disease in first degree relatives    ROS: the patient denies fever, no chills, no HA, no dizziness, no sore throat, no blurry vision, no CP, no palpitations,  SOB, no cough, no abdominal pain, no diarrhea, no N/V, no dysuria, no leg pain, no claudication, no rash, no joint aches, no rectal pain or bleeding, no night sweats    All other systems reviewed and are negative    Vital Signs Last 24 Hrs  T(C): 36.8 (14 Aug 2022 09:53), Max: 36.8 (14 Aug 2022 09:53)  T(F): 98.2 (14 Aug 2022 09:53), Max: 98.2 (14 Aug 2022 09:53)  HR: 61 (14 Aug 2022 11:00) (41 - 102)  BP: 124/52 (14 Aug 2022 11:00) (106/48 - 176/71)  BP(mean): 73 (14 Aug 2022 11:00) (70 - 93)  RR: 14 (14 Aug 2022 11:00) (11 - 30)  SpO2: 95% (14 Aug 2022 11:00) (83% - 100%)    Parameters below as of 14 Aug 2022 10:00  Patient On (Oxygen Delivery Method): nasal cannula  O2 Flow (L/min): 2    Daily Height in cm: 157.48 (14 Aug 2022 11:43)    Daily     PE:  Constitutional: frail looking  HEENT: NC/AT, EOMI, PERRLA, conjunctivae clear; ears and nose atraumatic; pharynx benign  Neck: supple; thyroid not palpable  Back: no tenderness  Respiratory: decreased breath sounds   Cardiovascular: S1S2 regular, no murmurs  Abdomen: soft, not tender, not distended, positive BS; liver and spleen WNL  Genitourinary: no suprapubic tenderness  Lymphatic: no LN palpable  Musculoskeletal: no muscle tenderness, no joint swelling or tenderness  Extremities: no pedal edema  Neurological/ Psychiatric: AxOx3, Judgement and insight normal;  moving all extremities  Skin: no rashes; no palpable lesions    Labs: all available labs reviewed                        11.1   9.67  )-----------( 222      ( 14 Aug 2022 08:07 )             33.6     08-14    140  |  108  |  50<H>  ----------------------------<  124<H>  4.1   |  26  |  1.28    Ca    9.8      14 Aug 2022 08:07  Phos  3.3     08-14  Mg     2.1     08-14    TPro  7.4  /  Alb  4.0  /  TBili  0.5  /  DBili  x   /  AST  16  /  ALT  51  /  AlkPhos  79  08-13     LIVER FUNCTIONS - ( 13 Aug 2022 23:11 )  Alb: 4.0 g/dL / Pro: 7.4 gm/dL / ALK PHOS: 79 U/L / ALT: 51 U/L / AST: 16 U/L / GGT: x                 Radiology: all available radiological tests reviewed  < from: CT Angio Chest PE Protocol w/ IV Cont (08.13.22 @ 23:53) >  ACC: [id number] EXAM:  CT ANGIO CHEST PULM ART [facility]                          PROCEDURE DATE:  08/13/2022          INTERPRETATION:  CLINICAL INFORMATION: 87 years old. Female. hypoxic, SOB.    COMPARISON: None.    PROCEDURE:  IV Contrast: Omnipaque 350 / 90 cc administered / 10 cc discarded.  Oral Contrast: NONE  Complications: None reported at time of study completion    PROCEDURE:  CT Angiography of the Chest. Sagittal and coronal reformats were   performed as well as 3D (MIP) reconstructions.    FINDINGS:    LUNGS AND AIRWAYS: Patent central airways. Diffuse septal thickening,   with patchy groundglass and denser consolidative opacities bilaterally in   all lobes..  PLEURA: Small bilateral pleural effusions.  MEDIASTINUM AND KRISTINE: Ill-defined 3.4 x 2.0 cm subcarinal adenopathy.  VESSELS: No evidence of pulmonary embolism.  HEART: Cardiomegaly. No pericardial effusion. Coronary artery   calcifications.  CHEST WALL AND LOWER NECK: Within normal limits.  VISUALIZED UPPER ABDOMEN: Grossly unremarkable.  BONES: Degenerative changes in the spine. Severe chronic T8, T10   compression deformities, and moderate anterior wedge compression   deformity of L1. Old right clavicle fracture deformity. Old right-sided   rib fractures.    IMPRESSION:    No CT evidence of pulmonary embolism.    Findings suggest pulmonary edema with small bilateral pleural effusions;   correlate clinically for superimposed infection.    Ill-defined subcarinal adenopathy probably related to history of lymphoma.      Advanced directives addressed: full resuscitation
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 She currently denies every having SOB but it was reported to be her main complaint when she came in. She reports now that she had weird sensations in her arms and just did not feel well iwth nausea but no vomiting.    She was found to be in acute hypoxemic resp failure 2/2 pulm edema/CHF and elevated troponins, placed on BiPAP.    : Hypoxemia improved, now on NC and saturating well. No longer has that feeling of unwell or arm symptoms. She denies SOB. Her troponin has increased significant and now she is in complete heart block - asymptomatic.  She denies any fevers, chills, CP, palp, abd pain, vomiting, dizziness, syncope, orthopnea, PND.    PAST MEDICAL HX  CAD (coronary artery disease)   HLD (hyperlipidemia)   HTN hypertension   Hypothyroidism   Waldenstrom's disease.     PAST SURGICAL HX  Upper endoscopy for heartburn + H pylori years ago that was treated  Colonoscopy  S/P hysterectomy.     FAMILY HX  No pertinent family history in first degree relatives. HTN, DM II    SOCIAL HX  , lives w   never smoker  no alcohol  usually independent w some assistance by daughter    Allergies  No Known Allergies    REVIEW OF SYSTEMS:  10 system ROS was obtained, all pertinent positives and negatives are in HPI otherwise they were negative.    Vital Signs Last 24 Hrs  T(C): 36.8 (14 Aug 2022 09:53), Max: 36.8 (14 Aug 2022 09:53)  T(F): 98.2 (14 Aug 2022 09:53), Max: 98.2 (14 Aug 2022 09:53)  HR: 61 (14 Aug 2022 11:00) (41 - 102)  BP: 124/52 (14 Aug 2022 11:00) (106/48 - 176/71)  BP(mean): 73 (14 Aug 2022 11:00) (70 - 93)  RR: 14 (14 Aug 2022 11:00) (11 - 30)  SpO2: 95% (14 Aug 2022 11:00) (83% - 100%)    Parameters below as of 14 Aug 2022 10:00  Patient On (Oxygen Delivery Method): nasal cannula  O2 Flow (L/min): 2    PHYSICAL EXAM:   Constitutional: awake and alert in NAD  HEENT: EOMI, Normal Hearing, MMM  Neck: Soft and supple, No LAD, mild JVD, no carotid bruit  Respiratory: Bibasilar crackles  Cardiovascular: S1 and S2, regular rate and rhythm, soft systolic murmur at LSB  Gastrointestinal: Bowel Sounds present, soft, nontender, nondistended, no guarding, no rebound  Extremities: Warm and well perfused, no peripheral edema  Neurological: A/O x 3, no focal deficits appreciated  Skin: No rashes appreciated    MEDICATIONS:  MEDICATIONS  (STANDING):  acyclovir   Oral Tab/Cap 400 milliGRAM(s) Oral two times a day  atorvastatin 20 milliGRAM(s) Oral at bedtime  calcium carbonate    500 mG (Tums) Chewable 2 Tablet(s) Chew at bedtime  ferrous    sulfate 325 milliGRAM(s) Oral daily  furosemide   Injectable 20 milliGRAM(s) IV Push two times a day  heparin  Infusion.  Unit(s)/Hr (9 mL/Hr) IV Continuous <Continuous>  levothyroxine 100 MICROGram(s) Oral daily  losartan 100 milliGRAM(s) Oral daily  piperacillin/tazobactam IVPB.. 3.375 Gram(s) IV Intermittent every 8 hours      LABS: All Labs Reviewed:                        11.1   9.67  )-----------( 222      ( 14 Aug 2022 08:07 )             33.6     08-14    140  |  108  |  50<H>  ----------------------------<  124<H>  4.1   |  26  |  1.28    Ca    9.8      14 Aug 2022 08:07  Phos  3.3     08-14  Mg     2.1     08-14    TPro  7.4  /  Alb  4.0  /  TBili  0.5  /  DBili  x   /  AST  16  /  ALT  51  /  AlkPhos  79  08-13    PT/INR - ( 13 Aug 2022 23:11 )   PT: 12.9 sec;   INR: 1.11 ratio    PTT - ( 14 Aug 2022 09:26 )  PTT:27.5 sec    Serum Pro-Brain Natriuretic Peptide: 22772 pg/mL ( @ 23:11)    HS-Troponins reviewed in EMR started in tuan 100s and increased up to 1400s    RADIOLOGY:    Reviewed in EMR    EK/14/22, my interpretation: CHB at 43bpm    TELEMETRY:    Reviewed, in complete heart block in the 40s with intermittent conducted beats    ECHO:    Ordered and pending  
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,, CAD, HLD, HTN, hypothyroid  came to ED by family c/o difficulty breathing and pain to arms and back     Pt reported to ED that /76 w HR 90  She usually has a slow heart rate and was taken off bystolic on Tuesday last week but was told to take 1 dose last night  pt and daughter denied hx of CHF  c/o bilateral arm pain L more than R, intermittent w radiation to upper back  could not grade it for me and could not tell me what made it better or what made it worse  denied fever or chills  denied sick contacts  +nausea  no vomiting or abdominal pain      In ED /71  HR 89   RR 19-30    84% sat RA  placed on NRB and then BiPAP  CTA no PE  + pulm edema asked to eval re  superimposed PNA  CCM consulted in ED as well as tele ICU  BiPA and echo advised but ICU care not required    8.14. Pt seen and examined, being treated with dapt/heparin for NSTEMI, currently denies any symptoms, no cp, sob, dizziness.   8.14.22 EKG: SR, CHB, junctiona escape 40s bpm, pt known to have LBBB    TELEMETRY: CHB Junctional rhythm PVC in bigeminy at times, HR 40s-50s bpm, no tachyarrhythmia       PAST MEDICAL HX  CAD (coronary artery disease)   HLD (hyperlipidemia)   HTN hypertension   Hypothyroidism   Waldenstrom's disease.     PAST SURGICAL HX  Upper endoscopy for heartburn + H pylori years ago that was treated  Colonoscopy  S/P hysterectomy.     FAMILY HX  No pertinent family history in first degree relatives. HTN, DM II      SOCIAL HX  , lives w   never smoker  no alcohol  usually independent w some assistance by daughter   (14 Aug 2022 05:29)    PAST MEDICAL & SURGICAL HISTORY:  Hypertension      Waldenstrom&#x27;s disease      HLD (hyperlipidemia)      CAD (coronary artery disease)      Hypothyroidism      S/P hysterectomy          MEDICATIONS  (STANDING):  acyclovir   Oral Tab/Cap 400 milliGRAM(s) Oral two times a day  atorvastatin 20 milliGRAM(s) Oral at bedtime  calcium carbonate    500 mG (Tums) Chewable 2 Tablet(s) Chew at bedtime  ferrous    sulfate 325 milliGRAM(s) Oral daily  furosemide   Injectable 20 milliGRAM(s) IV Push two times a day  heparin   Injectable 4000 Unit(s) IV Push once  heparin  Infusion.  Unit(s)/Hr (9 mL/Hr) IV Continuous <Continuous>  levothyroxine 100 MICROGram(s) Oral daily  losartan 100 milliGRAM(s) Oral daily  piperacillin/tazobactam IVPB.. 3.375 Gram(s) IV Intermittent every 8 hours    MEDICATIONS  (PRN):  acetaminophen     Tablet .. 650 milliGRAM(s) Oral every 6 hours PRN Temp greater or equal to 38C (100.4F), Mild Pain (1 - 3)  ALBUTerol    90 MICROgram(s) HFA Inhaler 2 Puff(s) Inhalation every 6 hours PRN Shortness of Breath and/or Wheezing  aluminum hydroxide/magnesium hydroxide/simethicone Suspension 30 milliLiter(s) Oral every 4 hours PRN Dyspepsia  heparin   Injectable 4000 Unit(s) IV Push every 6 hours PRN For aPTT less than 40  ondansetron Injectable 4 milliGRAM(s) IV Push every 8 hours PRN Nausea and/or Vomiting      FAMILY HISTORY:  No pertinent family history in first degree relatives      SOCIAL HISTORY: Denies tobacco, etoh abuse or illicit drug use      REVIEW OF SYSTEMS:    CONSTITUTIONAL:  As per HPI.  HEENT:  Eyes:  No diplopia or blurred vision. ENT:  No earache, sore throat or runny nose.  CARDIOVASCULAR:  No Dsypnea/Palpitations/Dizziness/Syncope, No pressure, squeezing, strangling, tightness, heaviness or aching about the chest, neck, axilla or epigastrium.  RESPIRATORY:  No cough, shortness of breath, PND or orthopnea.  GASTROINTESTINAL:  No nausea, vomiting or diarrhea.  GENITOURINARY:  No dysuria, frequency or urgency.  MUSCULOSKELETAL:  As per HPI.  SKIN:  No change in skin, hair or nails.  NEUROLOGIC:  No paresthesias, fasciculations, seizures or weakness.  PSYCHIATRIC:  No disorder of thought or mood.  ENDOCRINE:  No heat or cold intolerance, polyuria or polydipsia.  HEMATOLOGICAL:  No easy bruising or bleedings:    T(C): 36.8 (08-14-22 @ 09:53), Max: 36.8 (08-14-22 @ 09:53)  HR: 61 (08-14-22 @ 11:00) (41 - 102)  BP: 124/52 (08-14-22 @ 11:00) (106/48 - 176/71)  RR: 14 (08-14-22 @ 11:00) (11 - 30)  SpO2: 95% (08-14-22 @ 11:00) (83% - 100%)    PHYSICAL EXAMINATION:    GENERAL APPEARANCE:  Pt. is not currently dyspneic, in no distress. Pt. is alert, oriented, and pleasant.  HEENT:  Pupils are normal and react normally. No icterus. Mucous membranes well colored.  NECK:  Supple. No lymphadenopathy. Jugular venous pressure not elevated. Carotids equal.   HEART:   bradycardia 40-50s bpm. There are no murmurs, rubs or gallops noted  CHEST:  Chest is clear to auscultation. Normal respiratory effort.  ABDOMEN:  Soft and nontender.   EXTREMITIES:  There is no edema, warm and dry.      I&O's Summary      LABS:                        11.1   9.67  )-----------( 222      ( 14 Aug 2022 08:07 )             33.6     08-14    140  |  108  |  50<H>  ----------------------------<  124<H>  4.1   |  26  |  1.28    Ca    9.8      14 Aug 2022 08:07  Phos  3.3     08-14  Mg     2.1     08-14    TPro  7.4  /  Alb  4.0  /  TBili  0.5  /  DBili  x   /  AST  16  /  ALT  51  /  AlkPhos  79  08-13    LIVER FUNCTIONS - ( 13 Aug 2022 23:11 )  Alb: 4.0 g/dL / Pro: 7.4 gm/dL / ALK PHOS: 79 U/L / ALT: 51 U/L / AST: 16 U/L / GGT: x           PT/INR - ( 13 Aug 2022 23:11 )   PT: 12.9 sec;   INR: 1.11 ratio         PTT - ( 14 Aug 2022 09:26 )  PTT:27.5 sec

## 2022-08-14 NOTE — H&P ADULT - NSHPPHYSICALEXAM_GEN_ALL_CORE
Vital Signs Last 24 Hrs  T(C): 36.4 (13 Aug 2022 22:54), Max: 36.4 (13 Aug 2022 22:54)  T(F): 97.5 (13 Aug 2022 22:54), Max: 97.5 (13 Aug 2022 22:54)  HR: 43 (14 Aug 2022 04:24) (43 - 102)  BP: 106/48 (14 Aug 2022 04:24) (106/48 - 176/71)  BP(mean): 91 (13 Aug 2022 22:54) (91 - 91)  RR: 16 (14 Aug 2022 04:24) (16 - 30)  SpO2: 99% (14 Aug 2022 04:24) (83% - 99%)    Parameters below as of 14 Aug 2022 04:24  Patient On (Oxygen Delivery Method): BiPAP/CPAP

## 2022-08-14 NOTE — CONSULT NOTE ADULT - ASSESSMENT
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 Keyes at [facility] [place], CAD, HLD, HTN, hypothyroid came to ED by family c/o difficulty breathing and pain to arms and back now with concern for NSTEMI.     # Waldenstroms Macroglobulinemia   - Yoko Erika macroglobulinemia (bone marrow biopsy 12/15/2020 showing extensive low-grade B-cell lymphoproliferative infiltrate; IgM 5g), treated in ambulatory with weekly dexamethasone/bortezomib at [facility] and then transitioned to [facility]  - pt has been on calquence   - awaiting records from [facility]- request made to liaison to send records  - Calquence known to cause hemorrhage as well as afib/aflutter  - will continue to HOLD Acalabrutinib     # NSTEMI  - WBC 9.6, Hb 11.1, plt 222, troponin 143->432->962->1478 today   - bnp 14,473- on lasix  - CTA negative for PE, findings suggest pulmonary edema w b/l pleural effusions, ill defined subcarinal adenopathy- now on zosyn  - cardio consult  - started on asa, plavix, hep gtt, atorvastatin, - monitor for bleeding  - no BB due to bradycardia    Dr. Wale Beck  cell: [phone]  Weekends and nights please call [phone] for MD on call  [facility]  Hematology/Oncology

## 2022-08-14 NOTE — PROVIDER CONTACT NOTE (EICU) - RECOMMENDATIONS
Plan  1. Please give lasix 20mg IV now, goal to get net negative 1L in 12-18 hours, given her age and Cr, she may need higher dose of lasix.  However, if she is lasix naive, would not start high dose as first dose.  2. Strict Is/Os  3. Trend Trop, repeat EKG  4. Empiric cover with Ceftriaxone, ok with zosyn.  Please send MRSA swab, can hold Vanco for now  5. BiPAP, keep tidal volume above 350  6. NPO  7. ECHO as soon as possible

## 2022-08-14 NOTE — H&P ADULT - NSHPOUTPATIENTPROVIDERS_GEN_ALL_CORE
PCP Dr. Javier Wyatt  Onc [facility]  CARD Dr. Suero [facility]

## 2022-08-14 NOTE — H&P ADULT - NSHPADDITIONALINFOADULT_GEN_ALL_CORE
called daughter Mary Kay Okeefe at 05:10 to obtain med list called daughter Mary Kay Okeefe at 05:10 to obtain med list        report given to [facility] PA (who saw pt in ED) at 06:09

## 2022-08-14 NOTE — ED ADULT NURSE REASSESSMENT NOTE - NS ED NURSE REASSESS COMMENT FT1
pt was tolerating NRB well until she started coughing, sounds very congested, started to panic d/t "inability to breath", attempted to reposition and do chest PT, w/o much success, pt started sweating and posturing again, MD aware, resp called to bedside for BiPaP

## 2022-08-14 NOTE — PROVIDER CONTACT NOTE (EICU) - BACKGROUND
87F with a history of HTN, Waldenstroms lymphoma, hypothyroidism, hyperlipidemia, CHF presenting with difficulty breathing for 2 days.  As per ICU PA, pt's  has been a long term smoker.  CT showed pulmonary edema with consolidations, underlying pneumonia could not be ruled out.  ProBNP 14k, Cr 1.26, WBC 13.  No fever  ED team placed patient on BiPAP, as per ICU PA, pt was able to speak full sentences, not dyspneic, tolerating BiPAP

## 2022-08-14 NOTE — PATIENT PROFILE ADULT - FALL HARM RISK - HARM RISK INTERVENTIONS

## 2022-08-14 NOTE — H&P ADULT - HIV OFFER
[FreeTextEntry1] : No cause for bleeding found (other than grade 1 hemorrhoids), no proctiitis found either.\par Will follow for time being.\par Bleeding was associated with acutane treatment.\par She wants to restart acutane.  \par Will observe for now.\par If bleeds again will do colonoscopy (no family hx for colon cancer)\par She has IBS symptoms x years.\par To call for quick appointment if bleeds again so that we can examine her during the event.\par \par  Opt out

## 2022-08-14 NOTE — ED ADULT NURSE REASSESSMENT NOTE - NS ED NURSE REASSESS COMMENT FT1
pt placed on pacer/Zol pads as precaution d/t HR dropping to mid 30's. MD is aware, pt otherwise much better in appearance, no longer posturing, very comfortable in appearance. daughter remains @ bedside. repeat EKG

## 2022-08-14 NOTE — H&P ADULT - ASSESSMENT
#Acute hypoxic resp failure  #Acute pulmonary edema w BNP 14K  #Elevated troponin  1. admit to CICU  2. serial trop  3. s/p lasix IV in ED  4. lasix 20 mg IV q 12 hrs  5. daily weight  6. I/O  7. ECHO  8. cardiology consult  9. continue BiPAP  10. NTP in ED not continued  11. TSH in AM  12. albuterol q 6 hrs prn      #Sinus bradycardia  pt states usually slow so bystolic was held but she was told to take 1 dose yesterday  #Hx LBBB  1. monitor rate  2. check Mg      #Suggestion to evaluate clinically for superimposed infection  no fever or chills  sudden SOB w wet sounding cough  1. s/o cefepime in ED  2. s/p- vanco in ED  3. follow up procalcitonin  4. holding on added ABx      #Leukocytosis  1. trend      #Macrocytic anemia  1. B12, folate, TSH  2. continue Fe      #Waldenstrom/NHL  1. onc consult called  2. unable to order calquence  3. acyclovir prophylaxis  4. onc consult      #Hypothyroid  1. tsh  2. continue levothyroxine 100 mcg        #VTE  sq heparin    #Acute hypoxic resp failure  #Acute pulmonary edema w BNP 14K  #Elevated troponin  1. admit to CICU  2. serial trop  3. s/p lasix IV in ED  4. lasix 20 mg IV q 12 hrs  5. daily weight  6. I/O  7. ECHO  8. cardiology consult  9. continue BiPAP  10. NTP in ED not continued  11. TSH in AM  12. albuterol q 6 hrs prn      #Sinus bradycardia  pt states usually slow so bystolic was held but she was told to take 1 dose yesterday  #Hx LBBB  1. monitor rate  2. check Mg      #Suggestion to evaluate clinically for superimposed infection  no fever or chills  sudden SOB w wet sounding cough  1. s/o cefepime in ED  2. s/p- vanco in ED  3. follow up procalcitonin  4. holding on added ABx at this time      #Leukocytosis  1. trend      #Macrocytic anemia  1. B12, folate, TSH  2. continue Fe      #Waldenstrom/NHL  1. onc consult called  2. unable to order calquence  3. acyclovir prophylaxis  4. onc consult      #Hypothyroid  1. tsh  2. continue levothyroxine 100 mcg        #VTE  sq heparin    #Acute hypoxic resp failure  #Acute pulmonary edema w BNP 14K  #Elevated troponin  1. admit to CICU  2. serial trop  3. s/p lasix IV in ED  4. lasix 20 mg IV q 12 hrs  5. daily weight  6. I/O  7. ECHO  8. cardiology consult  9. continue BiPAP  10. NTP in ED not continued  11. TSH in AM  12. albuterol q 6 hrs prn      #Sinus bradycardia  pt states usually slow so bystolic was held but she was told to take 1 dose yesterday  #Hx LBBB  1. monitor rate  2. check Mg      #Suggestion to evaluate clinically for superimposed infection  no fever or chills  sudden SOB w wet sounding cough  no hx interstitial lung disease  1. s/o zosyn in ED  2. s/p- vanco in ED  3. follow up procalcitonin  4. holding on added ABx at this time      #Leukocytosis  1. trend      #Macrocytic anemia  1. B12, folate, TSH  2. continue Fe      #Waldenstrom/NHL  1. onc consult called  2. unable to order calquence (family will need to bring it in); spoke w pharmacy  3. acyclovir prophylaxis  4. onc consult      #Hypothyroid  1. tsh  2. continue levothyroxine 100 mcg        #VTE  sq heparin    #Acute hypoxic resp failure  #Acute pulmonary edema w BNP 14K  #Elevated troponin  1. admit to CICU  2. serial trop  3. s/p lasix IV in ED  4. lasix 20 mg IV q 12 hrs  5. daily weight  6. I/O  7. ECHO  8. cardiology consult  9. continue BiPAP  10. NTP in ED not continued  11. TSH in AM  12. albuterol q 6 hrs prn      #Sinus bradycardia  pt states usually slow so bystolic was held but she was told to take 1 dose yesterday  #Hx LBBB  1. monitor rate  2. check Mg      #Suggestion to evaluate clinically for superimposed infection  no fever or chills  sudden SOB w wet sounding cough  no hx interstitial lung disease  possibility of PNA superimposed  1. s/p zosyn in ED  2. s/p- vanco in ED  3. follow up procalcitonin  4. continuing zosyn for now  5. ID consult      #Leukocytosis  1. trend      #Macrocytic anemia  1. B12, folate, TSH  2. continue Fe      #Waldenstrom/NHL  1. onc consult called  2. unable to order calquence (family will need to bring it in); spoke w pharmacy  3. acyclovir prophylaxis  4. onc consult      #Hypothyroid  1. tsh  2. continue levothyroxine 100 mcg        #VTE  sq heparin    #Acute hypoxic resp failure  #Acute pulmonary edema w BNP 14K  #Elevated troponin  1. admit to CICU  2. serial trop  3. s/p lasix IV in ED  4. lasix 20 mg IV q 12 hrs  5. daily weight  6. I/O  7. ECHO  8. cardiology consult  9. continue BiPAP  10. NTP in ED not continued and can be removed   11. TSH in AM  12. albuterol q 6 hrs prn      #Sinus bradycardia  pt states usually slow so bystolic was held but she was told to take 1 dose yesterday  #Hx LBBB  1. monitor rate  2. check Mg      #Suggestion to evaluate clinically for superimposed infection  no fever or chills  sudden SOB w wet sounding cough  #nterstitial lung disease no hx; could it be due to hyperviscosity or involvement dye to waldenstrom  #possibility of PNA superimposed  1. s/p zosyn in ED  2. s/p- vanco in ED  3. follow up procalcitonin  4. continuing zosyn for now  5. ID consult      #Leukocytosis  1. trend      #Macrocytic anemia  1. B12, folate, TSH  2. continue Fe      #Waldenstrom/NHL  1. onc consult called  2. unable to order calquence (family will need to bring it in); spoke w pharmacy  3. acyclovir prophylaxis  4. onc consult      #Hypothyroid  1. tsh  2. continue levothyroxine 100 mcg        #VTE  sq heparin

## 2022-08-14 NOTE — PROVIDER CONTACT NOTE (EICU) - ASSESSMENT
Problem List  1. Most likely CHF exacerbation, will need to rule out ischemia as well  2. Likely with pneumonia as well, COVID neg  3. BONNIE  4. ? underlying lung pathology  5. T9, T10 compression fracture

## 2022-08-14 NOTE — PROGRESS NOTE ADULT - ASSESSMENT
A/P: 87 Female P/W NSTEMI and having intermetant CHB    Plan:  SICU  started on UFH, ASA, Plavex  No BBX  Seen BY EP  To be seen by by interventional cardio possible LHC  Pads on the patient  May need a TVP  May need to start Dopa  DVT Prophylaxis with UFH    D/W Cardio and EP

## 2022-08-14 NOTE — PROGRESS NOTE ADULT - SUBJECTIVE AND OBJECTIVE BOX
CC:    HPI:  87 year old female w hx Waldenstrom disease (NHL) on calquence,, CAD, HLD, HTN, hypothyroid  came to ED by family c/o difficulty breathing and pain to arms and back     8/14: Patient having periods of CHB and rising Trop          PAST MEDICAL HX  CAD (coronary artery disease)   HLD (hyperlipidemia)   HTN hypertension   Hypothyroidism   Waldenstrom's disease.     PAST SURGICAL HX  Upper endoscopy for heartburn + H pylori years ago that was treated  Colonoscopy  S/P hysterectomy.     FAMILY HX  No pertinent family history in first degree relatives. HTN, DM II      SOCIAL HX  , lives w   never smoker  no alcohol  usually independent w some assistance by daughter   (14 Aug 2022 05:29)       PAST MEDICAL & SURGICAL HISTORY:  Hypertension      Waldenstrom&#x27;s disease      HLD (hyperlipidemia)      CAD (coronary artery disease)      Hypothyroidism      S/P hysterectomy          FAMILY HISTORY:  No pertinent family history in first degree relatives        Social Hx:    Allergies    No Known Allergies    Intolerances        Height (cm): 157.5 (08-14 @ 11:43)  Weight (kg): 73.5 (08-14 @ 11:43)  BMI (kg/m2): 29.6 (08-14 @ 11:43)    ICU Vital Signs Last 24 Hrs  T(C): 36.8 (14 Aug 2022 14:22), Max: 36.8 (14 Aug 2022 09:53)  T(F): 98.2 (14 Aug 2022 14:22), Max: 98.2 (14 Aug 2022 09:53)  HR: 56 (14 Aug 2022 14:11) (41 - 102)  BP: 107/45 (14 Aug 2022 14:11) (95/49 - 176/71)  BP(mean): 64 (14 Aug 2022 14:11) (63 - 93)  ABP: --  ABP(mean): --  RR: 14 (14 Aug 2022 14:11) (11 - 30)  SpO2: 94% (14 Aug 2022 14:11) (83% - 100%)    O2 Parameters below as of 14 Aug 2022 10:00  Patient On (Oxygen Delivery Method): nasal cannula  O2 Flow (L/min): 2              I&O's Summary                            11.1   9.67  )-----------( 222      ( 14 Aug 2022 08:07 )             33.6       08-14    140  |  108  |  50<H>  ----------------------------<  124<H>  4.1   |  26  |  1.28    Ca    9.8      14 Aug 2022 08:07  Phos  3.3     08-14  Mg     2.1     08-14    TPro  7.4  /  Alb  4.0  /  TBili  0.5  /  DBili  x   /  AST  16  /  ALT  51  /  AlkPhos  79  08-13            ABG - ( 14 Aug 2022 06:53 )  pH, Arterial: 7.42  pH, Blood: x     /  pCO2: 35    /  pO2: 254   / HCO3: 23    / Base Excess: -1.3  /  SaO2: 100                     MEDICATIONS  (STANDING):  acyclovir   Oral Tab/Cap 400 milliGRAM(s) Oral two times a day  atorvastatin 40 milliGRAM(s) Oral at bedtime  calcium carbonate    500 mG (Tums) Chewable 2 Tablet(s) Chew at bedtime  ferrous    sulfate 325 milliGRAM(s) Oral daily  furosemide   Injectable 20 milliGRAM(s) IV Push two times a day  heparin  Infusion.  Unit(s)/Hr (9 mL/Hr) IV Continuous <Continuous>  levothyroxine 100 MICROGram(s) Oral daily  losartan 100 milliGRAM(s) Oral daily    MEDICATIONS  (PRN):  acetaminophen     Tablet .. 650 milliGRAM(s) Oral every 6 hours PRN Temp greater or equal to 38C (100.4F), Mild Pain (1 - 3)  ALBUTerol    90 MICROgram(s) HFA Inhaler 2 Puff(s) Inhalation every 6 hours PRN Shortness of Breath and/or Wheezing  aluminum hydroxide/magnesium hydroxide/simethicone Suspension 30 milliLiter(s) Oral every 4 hours PRN Dyspepsia  heparin   Injectable 4000 Unit(s) IV Push every 6 hours PRN For aPTT less than 40  ondansetron Injectable 4 milliGRAM(s) IV Push every 8 hours PRN Nausea and/or Vomiting      DVT Prophylaxis: UFH    Advanced Directives:  Discussed with:    Visit Information: 30 min    ** Time is exclusive of billed procedures and/or teaching and/or routine family updates.

## 2022-08-15 LAB
ADD ON TEST-SPECIMEN IN LAB: SIGNIFICANT CHANGE UP
ANION GAP SERPL CALC-SCNC: 6 MMOL/L — SIGNIFICANT CHANGE UP (ref 5–17)
APTT BLD: 64.9 SEC — HIGH (ref 27.5–35.5)
APTT BLD: 69.7 SEC — HIGH (ref 27.5–35.5)
BUN SERPL-MCNC: 49 MG/DL — HIGH (ref 7–23)
CALCIUM SERPL-MCNC: 9.1 MG/DL — SIGNIFICANT CHANGE UP (ref 8.5–10.1)
CHLORIDE SERPL-SCNC: 110 MMOL/L — HIGH (ref 96–108)
CO2 SERPL-SCNC: 26 MMOL/L — SIGNIFICANT CHANGE UP (ref 22–31)
CREAT SERPL-MCNC: 1.28 MG/DL — SIGNIFICANT CHANGE UP (ref 0.5–1.3)
EGFR: 41 ML/MIN/1.73M2 — LOW
GLUCOSE SERPL-MCNC: 121 MG/DL — HIGH (ref 70–99)
HCT VFR BLD CALC: 29.3 % — LOW (ref 34.5–45)
HCT VFR BLD CALC: 32.6 % — LOW (ref 34.5–45)
HGB BLD-MCNC: 10.5 G/DL — LOW (ref 11.5–15.5)
HGB BLD-MCNC: 9.6 G/DL — LOW (ref 11.5–15.5)
MAGNESIUM SERPL-MCNC: 2.3 MG/DL — SIGNIFICANT CHANGE UP (ref 1.6–2.6)
MCHC RBC-ENTMCNC: 32.2 GM/DL — SIGNIFICANT CHANGE UP (ref 32–36)
MCHC RBC-ENTMCNC: 32.8 GM/DL — SIGNIFICANT CHANGE UP (ref 32–36)
MCHC RBC-ENTMCNC: 33.8 PG — SIGNIFICANT CHANGE UP (ref 27–34)
MCHC RBC-ENTMCNC: 34 PG — SIGNIFICANT CHANGE UP (ref 27–34)
MCV RBC AUTO: 103.9 FL — HIGH (ref 80–100)
MCV RBC AUTO: 104.8 FL — HIGH (ref 80–100)
PHOSPHATE SERPL-MCNC: 3.6 MG/DL — SIGNIFICANT CHANGE UP (ref 2.5–4.5)
PLATELET # BLD AUTO: 185 K/UL — SIGNIFICANT CHANGE UP (ref 150–400)
PLATELET # BLD AUTO: 228 K/UL — SIGNIFICANT CHANGE UP (ref 150–400)
POTASSIUM SERPL-MCNC: 3.8 MMOL/L — SIGNIFICANT CHANGE UP (ref 3.5–5.3)
POTASSIUM SERPL-SCNC: 3.8 MMOL/L — SIGNIFICANT CHANGE UP (ref 3.5–5.3)
RBC # BLD: 2.82 M/UL — LOW (ref 3.8–5.2)
RBC # BLD: 3.11 M/UL — LOW (ref 3.8–5.2)
RBC # FLD: 15.7 % — HIGH (ref 10.3–14.5)
RBC # FLD: 15.9 % — HIGH (ref 10.3–14.5)
SODIUM SERPL-SCNC: 142 MMOL/L — SIGNIFICANT CHANGE UP (ref 135–145)
TROPONIN I, HIGH SENSITIVITY RESULT: 907.1 NG/L — HIGH
WBC # BLD: 10.45 K/UL — SIGNIFICANT CHANGE UP (ref 3.8–10.5)
WBC # BLD: 7.95 K/UL — SIGNIFICANT CHANGE UP (ref 3.8–10.5)
WBC # FLD AUTO: 10.45 K/UL — SIGNIFICANT CHANGE UP (ref 3.8–10.5)
WBC # FLD AUTO: 7.95 K/UL — SIGNIFICANT CHANGE UP (ref 3.8–10.5)

## 2022-08-15 PROCEDURE — 93306 TTE W/DOPPLER COMPLETE: CPT | Mod: 26

## 2022-08-15 PROCEDURE — 99233 SBSQ HOSP IP/OBS HIGH 50: CPT

## 2022-08-15 PROCEDURE — 93010 ELECTROCARDIOGRAM REPORT: CPT | Mod: 76

## 2022-08-15 PROCEDURE — 99232 SBSQ HOSP IP/OBS MODERATE 35: CPT

## 2022-08-15 RX ORDER — HEPARIN SODIUM 5000 [USP'U]/ML
750 INJECTION INTRAVENOUS; SUBCUTANEOUS
Qty: 25000 | Refills: 0 | Status: DISCONTINUED | OUTPATIENT
Start: 2022-08-15 | End: 2022-08-15

## 2022-08-15 RX ORDER — SODIUM CHLORIDE 9 MG/ML
1000 INJECTION INTRAMUSCULAR; INTRAVENOUS; SUBCUTANEOUS
Refills: 0 | Status: DISCONTINUED | OUTPATIENT
Start: 2022-08-15 | End: 2022-08-15

## 2022-08-15 RX ORDER — SACUBITRIL AND VALSARTAN 24; 26 MG/1; MG/1
1 TABLET, FILM COATED ORAL
Refills: 0 | Status: DISCONTINUED | OUTPATIENT
Start: 2022-08-15 | End: 2022-08-19

## 2022-08-15 RX ADMIN — SACUBITRIL AND VALSARTAN 1 TABLET(S): 24; 26 TABLET, FILM COATED ORAL at 22:01

## 2022-08-15 RX ADMIN — ATORVASTATIN CALCIUM 40 MILLIGRAM(S): 80 TABLET, FILM COATED ORAL at 22:01

## 2022-08-15 RX ADMIN — Medication 100 MICROGRAM(S): at 05:19

## 2022-08-15 RX ADMIN — Medication 2 TABLET(S): at 22:01

## 2022-08-15 RX ADMIN — Medication 400 MILLIGRAM(S): at 22:01

## 2022-08-15 RX ADMIN — Medication 81 MILLIGRAM(S): at 15:37

## 2022-08-15 RX ADMIN — HEPARIN SODIUM 750 UNIT(S)/HR: 5000 INJECTION INTRAVENOUS; SUBCUTANEOUS at 07:48

## 2022-08-15 RX ADMIN — CLOPIDOGREL BISULFATE 75 MILLIGRAM(S): 75 TABLET, FILM COATED ORAL at 15:38

## 2022-08-15 RX ADMIN — SODIUM CHLORIDE 50 MILLILITER(S): 9 INJECTION INTRAMUSCULAR; INTRAVENOUS; SUBCUTANEOUS at 11:28

## 2022-08-15 RX ADMIN — HEPARIN SODIUM 750 UNIT(S)/HR: 5000 INJECTION INTRAVENOUS; SUBCUTANEOUS at 01:53

## 2022-08-15 NOTE — PROGRESS NOTE ADULT - ASSESSMENT
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 Keyes at [facility] [place], CAD, HLD, HTN, hypothyroid came to ED by family c/o difficulty breathing and pain to arms and back now with concern for NSTEMI.     # Waldenstroms Macroglobulinemia   - Yoko Erika macroglobulinemia (bone marrow biopsy 12/15/2020 showing extensive low-grade B-cell lymphoproliferative infiltrate; IgM 5g), treated in ambulatory with weekly dexamethasone/bortezomib at [facility] and then transitioned to [facility] [place]  - pt has been on calquence with very good response, IgM 260 in 7/2022 (was over 5000 at diagnosis)  - Calquence known to cause hemorrhage as well as afib/aflutter  - will continue to HOLD Acalabrutinib     # NSTEMI  - WBC 9.6, Hb 11.1, plt 222, troponin 143->432->962->1478 today   - bnp 14,473- on lasix  - CTA negative for PE, findings suggest pulmonary edema w b/l pleural effusions, ill defined subcarinal adenopathy- now on zosyn  - cardio consult- possible left heart cath today   - on asa, plavix, hep gtt, atorvastatin, - monitor for bleeding- no BB due to bradycardia  - seen by EP- plan for dopamine if profound davis and will likely need TVP    Dr. 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 Keyes at [facility] [place], CAD, HLD, HTN, hypothyroid came to ED by family c/o difficulty breathing and pain to arms and back now with concern for NSTEMI.     # Waldenstroms Macroglobulinemia   - Yoko Erika macroglobulinemia (bone marrow biopsy 12/15/2020 showing extensive low-grade B-cell lymphoproliferative infiltrate; IgM 5g), treated in ambulatory with weekly dexamethasone/bortezomib at [facility] and then transitioned to [facility] [place]  - pt has been on calquence with very good response, IgM 260 in 7/2022 (was over 5000 at diagnosis)  - Calquence known to cause hemorrhage as well as afib/aflutter  - will continue to HOLD Acalabrutinib     # NSTEMI  - WBC 9.6, Hb 11.1, plt 222, troponin 143->432->962->1478 today   - bnp 14,473- on lasix  - CTA negative for PE, findings suggest pulmonary edema w b/l pleural effusions, ill defined subcarinal adenopathy- now on zosyn  - TTE with severe TR, severe pulm HTN, severe hypokinesis of anterior wall, apex severely hypokinetic with EF 40%  - cardio consult- left heart cath today   - on asa, plavix, hep gtt, atorvastatin, - monitor for bleeding- no BB due to bradycardia  - seen by EP- plan for dopamine if profound davis and may need TVP    Dr. Wale Beck  cell: [phone]  Weekends and nights please call [phone] for MD on call  [facility]  Hematology/Oncology

## 2022-08-15 NOTE — PROGRESS NOTE ADULT - ASSESSMENT
87 year old female w hx Waldenstrom disease (NHL) on calquence,, CAD, HLD, HTN, hypothyroid  came to ED by family c/o difficulty breathing and pain to arms and back   NSTEMI and CHB found to have non obstructive CAD.      Will plan for permanent pacemaker implant tomorrow  I/R/B/A discussed with patient and family with verbalized understanding  Patient is agreeable  NPO after MN  Plan discussed with patient, CCU and Dr. Sharp

## 2022-08-15 NOTE — PROGRESS NOTE ADULT - SUBJECTIVE AND OBJECTIVE BOX
86 y/o female with PMH waldstroms disease, CAD, HTN, HLD, hypothroidism, presented to ED with c/o difficulty breathing. Pt found to be NSTEMI with a peak trop of 1478 and in complete heart block . Pt referred to cardiac cath for further ischemic evaluation.  -Consent obtained for cardiac catheterization w/ coronary angiogram and possible stent placement. Pt is competent, has capacity, and understands risks and benefits of procedure. Risks and benefits discussed. Risk discussed included, but not limited to MI, stroke, mortality, major bleeding, arrythmia, or infection. All questions answered     ASA class: II  Creatinine: 1.28  GFR: 41  Bleeding  Risk score: 7.8%  Rodney score: 15 pts-  pt arrived with respiratory difficulty, new onset CHF? will not follow [facility] protocol at this time. Awaiting echo results.

## 2022-08-15 NOTE — PROGRESS NOTE ADULT - SUBJECTIVE AND OBJECTIVE BOX
Nurse Practitioner Progress note:     HPI:  87 year old female w hx Waldenstrom disease (NHL) on calquence,, CAD, HLD, HTN, hypothyroid  came to ED by family c/o difficulty breathing and pain to arms and back   Pt reported to ED that /76 w HR 90She usually has a slow heart rate and was taken off bystolic on Tuesday last week but was told to take 1 dose last night  pt and daughter denied hx of CHF c/o bilateral arm pain L more than R, intermittent w radiation to upper back could not grade it for me and could not tell me what made it better or what made it worse denied fever or chills denied sick contacts +nausea no vomiting or abdominal pain.    Pt noted to be NSTEMI and in CHB, pt referred to cardiac cath for further ischemic evaluation.     S/P LHC revealing non obstructive cors.   TPM inserted into RFV until PPM can be placed by EP    PAST MEDICAL HX  CAD (coronary artery disease)   HLD (hyperlipidemia)   HTN hypertension   Hypothyroidism   Waldenstrom's disease.     PAST SURGICAL HX  Upper endoscopy for heartburn + H pylori years ago that was treated  Colonoscopy  S/P hysterectomy.     FAMILY HX  No pertinent family history in first degree relatives. HTN, DM II      SOCIAL HX  , lives w   never smoker  no alcohol  usually independent w some assistance by daughter   (14 Aug 2022 05:29)    T(C): 36.7 (08-15-22 @ 08:39), Max: 37 (08-15-22 @ 06:00)  HR: 41 (08-15-22 @ 11:15) (31 - 71)  BP: 138/47 (08-15-22 @ 11:05) (95/49 - 154/66)  RR: 16 (08-15-22 @ 11:15) (12 - 17)  SpO2: 94% (08-15-22 @ 11:15) (90% - 98%)          PHYSICAL EXAM:  NEURO: Non-focal, AxOx3.  No neuro deficits NECK: Supple, No JVD, No LAD  CHEST/LUNG: Clear to auscultation bilaterally; No wheeze  HEART: s1 s2 Regular rate and rhythm; No murmurs, rubs, or gallops  ABDOMEN: Soft, Nontender, Nondistended; Bowel sounds present X 4 quadrants   EXTREMITIES:  2+ Peripheral Pulses, No clubbing, cyanosis, or edema   VASCULAR: Peripheral pulses palpable 2+ bilaterally  PROCEDURE SITE: RFA with perclose, RFV TPM sutured ,Site is without hematoma or bleeding. Sensation and MALLORY intact. Distal pulses palpable 2+, capillary refill < 2 seconds. Patient denies pain, numbness, tingling, CP or SOB. Clean dry dressing applied     PROCEDURE RESULTS:b Full report to follow     ASSESSMENT: HPI:  87 year old female w hx Waldenstrom disease (NHL) on calquence,, CAD, HLD, HTN, hypothyroid  came to ED by family c/o difficulty breathing and pain to arms and back   Pt reported to ED that /76 w HR 90She usually has a slow heart rate and was taken off bystolic on Tuesday last week but was told to take 1 dose last night  pt and daughter denied hx of CHF c/o bilateral arm pain L more than R, intermittent w radiation to upper back could not grade it for me and could not tell me what made it better or what made it worse denied fever or chills denied sick contacts +nausea no vomiting or abdominal pain.    Pt noted to be NSTEMI and in CHB, pt referred to cardiac cath for further ischemic evaluation.     S/P LHC revealing non obstructive cors.   TPM ( settings Ma 8 Rate 40)inserted into RFV until PPM can be placed by EP      PLAN:  Place pt in CCU  -Full bed rest while TPM remains in place  -gentle IV hydration due to reduced EF of 40%  -Continue current medications  -Report given bedside by Dr. Booker to CCU PA  -Plan of care discussed with patient, family,. dr booker and ccu  -EP aware of TPM   -Rest of care to be addressed by primary team

## 2022-08-15 NOTE — PROGRESS NOTE ADULT - NS PANP COMMENT GEN_ALL_CORE FT
Agree with above. Non-obstructive CAD. TVP kept in place with EP eventual plan for durable device.     Thank you for allowing me to participate in the care of your patient. Feel free to contact me if any clinical issues arise.    Phoenix Mena MD, FACC, [facility]   Director, Interventional Cardiology, [facility]  Faculty Interventional Cardiologist, [facility] Office: [phone]  [place] Office: [phone]  Email: [email or web address]

## 2022-08-15 NOTE — PROGRESS NOTE ADULT - SUBJECTIVE AND OBJECTIVE BOX
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  Patient noted to have NSTEMI and CHB    8/15/22: Seen at bedside. s/p LHC with non obstructive CAD, TVP placed during procedure    MEDICATIONS  (STANDING):  acyclovir   Oral Tab/Cap 400 milliGRAM(s) Oral two times a day  aspirin enteric coated 81 milliGRAM(s) Oral daily  atorvastatin 40 milliGRAM(s) Oral at bedtime  calcium carbonate    500 mG (Tums) Chewable 2 Tablet(s) Chew at bedtime  clopidogrel Tablet 75 milliGRAM(s) Oral daily  ferrous    sulfate 325 milliGRAM(s) Oral daily  furosemide   Injectable 20 milliGRAM(s) IV Push two times a day  heparin  Infusion. 750 Unit(s)/Hr (7.5 mL/Hr) IV Continuous <Continuous>  levothyroxine 100 MICROGram(s) Oral daily  sacubitril 24 mG/valsartan 26 mG 1 Tablet(s) Oral two times a day  sodium chloride 0.9%. 1000 milliLiter(s) (50 mL/Hr) IV Continuous <Continuous>    MEDICATIONS  (PRN):  acetaminophen     Tablet .. 650 milliGRAM(s) Oral every 6 hours PRN Temp greater or equal to 38C (100.4F), Mild Pain (1 - 3)  ALBUTerol    90 MICROgram(s) HFA Inhaler 2 Puff(s) Inhalation every 6 hours PRN Shortness of Breath and/or Wheezing  aluminum hydroxide/magnesium hydroxide/simethicone Suspension 30 milliLiter(s) Oral every 4 hours PRN Dyspepsia  heparin   Injectable 4000 Unit(s) IV Push every 6 hours PRN For aPTT less than 40  ondansetron Injectable 4 milliGRAM(s) IV Push every 8 hours PRN Nausea and/or Vomiting    Vital Signs Last 24 Hrs  T(C): 36.8 (15 Aug 2022 16:33), Max: 37 (15 Aug 2022 06:00)  T(F): 98.3 (15 Aug 2022 16:33), Max: 98.6 (15 Aug 2022 06:00)  HR: 41 (15 Aug 2022 16:39) (31 - 58)  BP: 132/48 (15 Aug 2022 13:55) (106/49 - 154/66)  BP(mean): 69 (15 Aug 2022 06:00) (65 - 73)  RR: 16 (15 Aug 2022 16:39) (12 - 17)  SpO2: 97% (15 Aug 2022 16:39) (93% - 98%)    Parameters below as of 15 Aug 2022 16:35  Patient On (Oxygen Delivery Method): nasal cannula  O2 Flow (L/min): 2          PHYSICAL EXAMINATION:    GENERAL APPEARANCE:  Pt. is not currently dyspneic, in no distress. Pt. is alert, oriented, and pleasant.  HEENT:  Pupils are normal and react normally. No icterus. Mucous membranes well colored.  NECK:  Supple. No lymphadenopathy. Jugular venous pressure not elevated. Carotids equal.   HEART:   bradycardia There are no murmurs, rubs or gallops noted  CHEST:  Chest is clear to auscultation. Normal respiratory effort.  Parameters below as of 15 Aug 2022 16:35  Patient On (Oxygen Delivery Method): nasal cannula  O2 Flow (L/min): 2  ABDOMEN:  Soft and nontender.   EXTREMITIES:  There is no edema, warm and dry.      I&O's Summary      LABS:  08-15    142  |  110<H>  |  49<H>  ----------------------------<  121<H>  3.8   |  26  |  1.28    Ca    9.1      15 Aug 2022 05:34  Phos  3.6     08-15  Mg     2.3     08-15    TPro  7.4  /  Alb  4.0  /  TBili  0.5  /  DBili  x   /  AST  16  /  ALT  51  /  AlkPhos  79  08-13                     Cardiology:  < from: TTE Echo Complete w/o Contrast w/ Doppler (08.15.22 @ 08:12) >   Summary     The mitral valve leaflets appear thickened.   Moderate (2+) mitral regurgitation is present.   The aortic valve appears mildly calcified. Valve opening seems to be   restricted.   There are fibrocalcific changes noted to the aortic valve leaflets with   restriction in leaflet excursion. Transaortic gradients are   underestimated due to impaired left ventricle systolic function. Mild to   moderate aortic stenosis is present.   Mild aortic insufficiency noted.   The tricuspid valve leaflets appear mildly thickened and/or calcified,   but   open well.   moderate to severe (3+) eccentric tricuspid valve regurgitation is   present.   Severe pulmonary hypertension.   Normal appearing pulmonic valve structure.   Mild pulmonic valvular regurgitation (1+) is present.   The left atrium is mildly dilated.   Severe hypokinesis of the anteroseptal and inferoseptal walls.   Hypokinesis of the anterior wall is noted.   The apex is severely hypokinetic withsparing of the inferior wall.   Estimated left ventricular ejection fraction is about 40 %.   The right atrium appears mildly dilated.   The right ventricle is mildly dilated.   IVC is dilated and not collapsing with inspiration.    < from: Cardiac Catheterization (08.15.22 @ 10:08) >  Diagnostic Conclusions     Non-obstructive coronary artery disease. Pulmonary hypertension with   likely group 2 physiology given elevated PCWP. Remains in CHB requiring   TVP with threshold of 4.     Recommendations     Recommend aggressive medical management of non-obstructive CAD as per   ACC/AHA guidelines. Maintain TVP at 60 with threshold of 80 for back up   pacing given asymptomatic and no hemodynamic compromise. EP to evaluate   patient for durable PPM.

## 2022-08-15 NOTE — PROGRESS NOTE ADULT - ASSESSMENT
A/P: 87 Female P/W NSTEMI and having intermetant CHB    Plan:  - Transferred to CCU  - s/p LHC revealing non obstructive CAD, TVP placed in cath lab  - full bedrest with TVP in place, plan for PPM by EP tomorrow  - cont DAPT, statin, IV lasix 20mg bid  - avoid AV kai blockers  - entresto started by cardio  - TTE showing severe TR, pulm HTN, severe hypokinesis anterior wall and apex, EF 40%  - NPO after MN  - cont synthroid  - seen by heme/onc for waldenstrom dz   - Calquence held as per heme/onc due to ADR of Afib/flutter  - DVT Prophylaxis- SCDs    Discussed with Dr. Jimenez

## 2022-08-15 NOTE — PROVIDER CONTACT NOTE (OTHER) - SITUATION
Office is aware that patient is in [facility].  Graciela fax discharge papers to [phone].
dr cuello called in and put the consult in
spoke to marily from service aware of consult
Patients heart rate dropping to 33-37, patient sleeping, no complains of pain. Patient remains in Complete HB.

## 2022-08-15 NOTE — PROGRESS NOTE ADULT - SUBJECTIVE AND OBJECTIVE BOX
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 She currently denies every having SOB but it was reported to be her main complaint when she came in. She reports now that she had weird sensations in her arms and just did not feel well iwth nausea but no vomiting.    She was found to be in acute hypoxemic resp failure 2/2 pulm edema/CHF and elevated troponins, placed on BiPAP.    : Hypoxemia improved, now on NC and saturating well. No longer has that feeling of unwell or arm symptoms. She denies SOB. Her troponin has increased significant and now she is in complete heart block - asymptomatic.  She denies any fevers, chills, CP, palp, abd pain, vomiting, dizziness, syncope, orthopnea, PND.  8/15: Patient had bradycardia to the 30s O/N. Rhythm is second degree type 2 AV block with episodes of CHB - asymptomatic. She feels well without any current complaints. Still requiring O2    PAST MEDICAL HX  CAD (coronary artery disease)   HLD (hyperlipidemia)   HTN hypertension   Hypothyroidism   Waldenstrom's disease.     PAST SURGICAL HX  Upper endoscopy for heartburn + H pylori years ago that was treated  Colonoscopy  S/P hysterectomy.     FAMILY HX  No pertinent family history in first degree relatives. HTN, DM II    SOCIAL HX  , lives w   never smoker  no alcohol  usually independent w some assistance by daughter    Allergies  No Known Allergies    REVIEW OF SYSTEMS:  10 system ROS was obtained, all pertinent positives and negatives are in HPI otherwise they were negative.    Daily Weight in k (15 Aug 2022 06:00)    Vital Signs Last 24 Hrs  T(C): 36.7 (15 Aug 2022 08:39), Max: 37 (15 Aug 2022 06:00)  T(F): 98 (15 Aug 2022 08:39), Max: 98.6 (15 Aug 2022 06:00)  HR: 58 (15 Aug 2022 08:39) (31 - 71)  BP: 154/66 (15 Aug 2022 08:39) (95/49 - 154/66)  BP(mean): 69 (15 Aug 2022 06:00) (63 - 73)  RR: 16 (15 Aug 2022 08:39) (12 - 17)  SpO2: 94% (15 Aug 2022 08:39) (90% - 99%)    Parameters below as of 14 Aug 2022 10:00  Patient On (Oxygen Delivery Method): nasal cannula  O2 Flow (L/min): 2    I&O's Summary    14 Aug 2022 07:01  -  15 Aug 2022 07:00  --------------------------------------------------------  IN: 234 mL / OUT: 800 mL / NET: -566 mL    PHYSICAL EXAM:   Constitutional: awake and alert in NAD  HEENT: EOMI, Normal Hearing, MMM  Neck: Soft and supple, No LAD, + JVD, no carotid bruit  Respiratory: CTA B/L with decreased breath sounds at bases  Cardiovascular: bradycardiac, Soft systolic ejection murmur at RUSB with III/VI systolic murmur at LSB  Gastrointestinal: Bowel Sounds present, soft, nontender, nondistended, no guarding, no rebound  Extremities: Warm and well perfused, no peripheral edema  Neurological: A/O x 3, no focal deficits appreciated  Skin: No rashes appreciated    MEDICATIONS  (STANDING):  acyclovir   Oral Tab/Cap 400 milliGRAM(s) Oral two times a day  aspirin enteric coated 81 milliGRAM(s) Oral daily  atorvastatin 40 milliGRAM(s) Oral at bedtime  calcium carbonate    500 mG (Tums) Chewable 2 Tablet(s) Chew at bedtime  clopidogrel Tablet 75 milliGRAM(s) Oral daily  ferrous    sulfate 325 milliGRAM(s) Oral daily  furosemide   Injectable 20 milliGRAM(s) IV Push two times a day  heparin  Infusion. 750 Unit(s)/Hr (7.5 mL/Hr) IV Continuous <Continuous>  levothyroxine 100 MICROGram(s) Oral daily  losartan 100 milliGRAM(s) Oral daily    MEDICATIONS  (PRN):  acetaminophen     Tablet .. 650 milliGRAM(s) Oral every 6 hours PRN Temp greater or equal to 38C (100.4F), Mild Pain (1 - 3)  ALBUTerol    90 MICROgram(s) HFA Inhaler 2 Puff(s) Inhalation every 6 hours PRN Shortness of Breath and/or Wheezing  aluminum hydroxide/magnesium hydroxide/simethicone Suspension 30 milliLiter(s) Oral every 4 hours PRN Dyspepsia  heparin   Injectable 4000 Unit(s) IV Push every 6 hours PRN For aPTT less than 40  ondansetron Injectable 4 milliGRAM(s) IV Push every 8 hours PRN Nausea and/or Vomiting    LABS: All Labs Reviewed:                         10.5   10.45 )-----------( 228      ( 15 Aug 2022 08:21 )             32.6                        11.1   9.67  )-----------( 222      ( 14 Aug 2022 08:07 )             33.6     08-15    142  |  110<H>  |  49<H>  ----------------------------<  121<H>  3.8   |  26  |  1.28    Ca    9.1      15 Aug 2022 05:34  Phos  3.6     08-15  Mg     2.3     08-15    TPro  7.4  /  Alb  4.0  /  TBili  0.5  /  DBili  x   /  AST  16  /  ALT  51  /  AlkPhos  79  08-13    08-14    140  |  108  |  50<H>  ----------------------------<  124<H>  4.1   |  26  |  1.28    Ca    9.8      14 Aug 2022 08:07  Phos  3.3     08-14  Mg     2.1     08-14    TPro  7.4  /  Alb  4.0  /  TBili  0.5  /  DBili  x   /  AST  16  /  ALT  51  /  AlkPhos  79  08-13    PT/INR - ( 13 Aug 2022 23:11 )   PT: 12.9 sec;   INR: 1.11 ratio    PTT - ( 14 Aug 2022 09:26 )  PTT:27.5 sec    Serum Pro-Brain Natriuretic Peptide: 55835 pg/mL ( @ 23:11)    HS-Troponins reviewed in EMR started in tuan 100s and increased up to 1400s    RADIOLOGY:    Reviewed in EMR    EK/14/22, my interpretation: CHB at 43bpm    TELEMETRY:    Reviewed, varying between second degree type two AV block and complete heart block    ECHO:    ACC: [id number] EXAM:  ECHO TTE WO CON COMP W DOPP                        PROCEDURE DATE:  08/15/2022      M-Mode Measurements (cm)     LVEDd: 4.76 cm            LVESd: 3.93 cm   IVSEd: 0.94 cm  LVPWd: 0.94 cm            AO Root Dimension: 2.9 cm                             ACS: 1.5 cm                             LA: 3.8 cm                             LVOT: 1.7 cm    Doppler Measurements:     AV Velocity:281 cm/s                 MV Peak E-Wave: 147 cm/s   AV Peak Gradient: 31.58 mmHg         MV Peak A-Wave: 133 cm/s   AV Mean Gradient: 15 mmHg            MV E/A Ratio: 1.11 %   AV Area (Continuity):1.09 cm^2       MV Peak Gradient: 8.64 mmHg   TR Velocity:406 cm/s   TR Gradient:65.9344 mmHg   Estimated RAP:15 mmHg   RVSP:81 mmHg     Impression     The mitral valve leaflets appear thickened.   Moderate (2+) mitral regurgitation is present.   The aortic valve appears mildly calcified. Valve opening seems to be restricted.   There are fibrocalcific changes noted to the aortic valve leaflets with restriction in leaflet excursion. Transaortic gradients are    underestimated due to impaired left ventricle systolic function. Mild to moderate aortic stenosis is present.   Mild aortic insufficiency noted.   The tricuspid valve leaflets appear mildly thickened and/or calcified, but open well. moderate to severe (3+) eccentric tricuspid valve       regurgitation is present.   Severe pulmonary hypertension.   Normal appearing pulmonic valve structure.   Mild pulmonic valvular regurgitation (1+) is present.   The left atrium is mildly dilated.   Severe hypokinesis of the anteroseptal and inferoseptal walls.   Hypokinesis of the anterior wall is noted.   The apex is severely hypokinetic with sparing of the inferior wall.   Estimated left ventricular ejection fraction is about 40 %.   The right atrium appears mildly dilated.   The right ventricle is mildly dilated.   IVC is dilated and not collapsing with inspiration.   Pleural effusion -is present.

## 2022-08-15 NOTE — PROGRESS NOTE ADULT - SUBJECTIVE AND OBJECTIVE BOX
INTERVAL HPI/OVERNIGHT EVENTS:  Patient S&E at bedside. Overnight, HR in 30s while sleeping, /47 this am, 98% on RA  tele reading 3* HB  Today, wBC 7.95, Hb 9.6, plt 185, PTT in range,     PAST MEDICAL & SURGICAL HISTORY:  Hypertension      Waldenstrom&#x27;s disease      HLD (hyperlipidemia)      CAD (coronary artery disease)      Hypothyroidism      S/P hysterectomy          FAMILY HISTORY:  No pertinent family history in first degree relatives        VITAL SIGNS:  T(F): 98.6 (08-15-22 @ 06:00)  HR: 31 (08-15-22 @ 06:00)  BP: 122/47 (08-15-22 @ 06:00)  RR: 15 (08-15-22 @ 06:00)  SpO2: 98% (08-15-22 @ 06:00)  Wt(kg): --    PHYSICAL EXAM:    GENERAL: NAD, on NC  HEAD:  Atraumatic,   EYES: EOMI,   CHEST/LUNG: poor inspiratory effort- on NC  HEART: bradycardic  ABDOMEN: Soft, Nontender,   EXTREMITIES:  no edema   NEUROLOGY: non-focal  SKIN: No rashes or lesions    MEDICATIONS  (STANDING):  acyclovir   Oral Tab/Cap 400 milliGRAM(s) Oral two times a day  aspirin enteric coated 81 milliGRAM(s) Oral daily  atorvastatin 40 milliGRAM(s) Oral at bedtime  calcium carbonate    500 mG (Tums) Chewable 2 Tablet(s) Chew at bedtime  clopidogrel Tablet 75 milliGRAM(s) Oral daily  ferrous    sulfate 325 milliGRAM(s) Oral daily  furosemide   Injectable 20 milliGRAM(s) IV Push two times a day  heparin  Infusion. 750 Unit(s)/Hr (7.5 mL/Hr) IV Continuous <Continuous>  levothyroxine 100 MICROGram(s) Oral daily  losartan 100 milliGRAM(s) Oral daily    MEDICATIONS  (PRN):  acetaminophen     Tablet .. 650 milliGRAM(s) Oral every 6 hours PRN Temp greater or equal to 38C (100.4F), Mild Pain (1 - 3)  ALBUTerol    90 MICROgram(s) HFA Inhaler 2 Puff(s) Inhalation every 6 hours PRN Shortness of Breath and/or Wheezing  aluminum hydroxide/magnesium hydroxide/simethicone Suspension 30 milliLiter(s) Oral every 4 hours PRN Dyspepsia  heparin   Injectable 4000 Unit(s) IV Push every 6 hours PRN For aPTT less than 40  ondansetron Injectable 4 milliGRAM(s) IV Push every 8 hours PRN Nausea and/or Vomiting      Allergies    No Known Allergies    Intolerances        LABS:                        9.6    7.95  )-----------( 185      ( 15 Aug 2022 05:34 )             29.3     08-15    142  |  110<H>  |  49<H>  ----------------------------<  121<H>  3.8   |  26  |  1.28    Ca    9.1      15 Aug 2022 05:34  Phos  3.6     08-15  Mg     2.3     08-15    TPro  7.4  /  Alb  4.0  /  TBili  0.5  /  DBili  x   /  AST  16  /  ALT  51  /  AlkPhos  79  08-13    PT/INR - ( 13 Aug 2022 23:11 )   PT: 12.9 sec;   INR: 1.11 ratio         PTT - ( 15 Aug 2022 01:08 )  PTT:69.7 sec      RADIOLOGY & ADDITIONAL TESTS:  Studies reviewed.   INTERVAL HPI/OVERNIGHT EVENTS:  Patient S&E at bedside. Overnight, HR in 30s while sleeping, /47 this am, 98% on RA  tele reading 3* HB  Today, wBC 7.95, Hb 9.6, plt 185, PTT in range,   pt was at cath and chart review    PAST MEDICAL & SURGICAL HISTORY:  Hypertension      Waldenstrom&#x27;s disease      HLD (hyperlipidemia)      CAD (coronary artery disease)      Hypothyroidism      S/P hysterectomy          FAMILY HISTORY:  No pertinent family history in first degree relatives        VITAL SIGNS:  T(F): 98.6 (08-15-22 @ 06:00)  HR: 31 (08-15-22 @ 06:00)  BP: 122/47 (08-15-22 @ 06:00)  RR: 15 (08-15-22 @ 06:00)  SpO2: 98% (08-15-22 @ 06:00)  Wt(kg): --    PHYSICAL EXAM:    MEDICATIONS  (STANDING):  acyclovir   Oral Tab/Cap 400 milliGRAM(s) Oral two times a day  aspirin enteric coated 81 milliGRAM(s) Oral daily  atorvastatin 40 milliGRAM(s) Oral at bedtime  calcium carbonate    500 mG (Tums) Chewable 2 Tablet(s) Chew at bedtime  clopidogrel Tablet 75 milliGRAM(s) Oral daily  ferrous    sulfate 325 milliGRAM(s) Oral daily  furosemide   Injectable 20 milliGRAM(s) IV Push two times a day  heparin  Infusion. 750 Unit(s)/Hr (7.5 mL/Hr) IV Continuous <Continuous>  levothyroxine 100 MICROGram(s) Oral daily  losartan 100 milliGRAM(s) Oral daily    MEDICATIONS  (PRN):  acetaminophen     Tablet .. 650 milliGRAM(s) Oral every 6 hours PRN Temp greater or equal to 38C (100.4F), Mild Pain (1 - 3)  ALBUTerol    90 MICROgram(s) HFA Inhaler 2 Puff(s) Inhalation every 6 hours PRN Shortness of Breath and/or Wheezing  aluminum hydroxide/magnesium hydroxide/simethicone Suspension 30 milliLiter(s) Oral every 4 hours PRN Dyspepsia  heparin   Injectable 4000 Unit(s) IV Push every 6 hours PRN For aPTT less than 40  ondansetron Injectable 4 milliGRAM(s) IV Push every 8 hours PRN Nausea and/or Vomiting      Allergies    No Known Allergies    Intolerances        LABS:                        9.6    7.95  )-----------( 185      ( 15 Aug 2022 05:34 )             29.3     08-15    142  |  110<H>  |  49<H>  ----------------------------<  121<H>  3.8   |  26  |  1.28    Ca    9.1      15 Aug 2022 05:34  Phos  3.6     08-15  Mg     2.3     08-15    TPro  7.4  /  Alb  4.0  /  TBili  0.5  /  DBili  x   /  AST  16  /  ALT  51  /  AlkPhos  79  08-13    PT/INR - ( 13 Aug 2022 23:11 )   PT: 12.9 sec;   INR: 1.11 ratio         PTT - ( 15 Aug 2022 01:08 )  PTT:69.7 sec      RADIOLOGY & ADDITIONAL TESTS:  Studies reviewed.

## 2022-08-15 NOTE — PROGRESS NOTE ADULT - SUBJECTIVE AND OBJECTIVE BOX
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 s/p LHC today with TVP placement. Transferred to CCU      PAST MEDICAL HX  CAD (coronary artery disease)   HLD (hyperlipidemia)   HTN hypertension   Hypothyroidism   Waldenstrom's disease.     PAST SURGICAL HX  Upper endoscopy for heartburn + H pylori years ago that was treated  Colonoscopy  S/P hysterectomy.     FAMILY HX  No pertinent family history in first degree relatives. HTN, DM II      SOCIAL HX  , lives w   never smoker  no alcohol  usually independent w some assistance by daughter   (14 Aug 2022 05:29)       PAST MEDICAL & SURGICAL HISTORY:  Hypertension  Waldenstrom&#x27;s disease  HLD (hyperlipidemia)  CAD (coronary artery disease)  Hypothyroidism  S/P hysterectomy    Height (cm): 157.5 (08-14 @ 11:43)  Weight (kg): 73.5 (08-14 @ 11:43)  BMI (kg/m2): 29.6 (08-14 @ 11:43)    Vital Signs Last 24 Hrs  T(C): 36.8 (15 Aug 2022 16:33), Max: 37 (15 Aug 2022 06:00)  T(F): 98.3 (15 Aug 2022 16:33), Max: 98.6 (15 Aug 2022 06:00)  HR: 41 (15 Aug 2022 17:00) (31 - 58)  BP: 136/56 (15 Aug 2022 17:00) (106/49 - 154/66)  BP(mean): 66 (15 Aug 2022 17:00) (65 - 101)  RR: 12 (15 Aug 2022 17:00) (11 - 17)  SpO2: 97% (15 Aug 2022 17:00) (93% - 98%)      Parameters below as of 15 Aug 2022 16:43  Patient On (Oxygen Delivery Method): nasal cannula  O2 Flow (L/min): 2                          10.5   10.45 )-----------( 228      ( 15 Aug 2022 08:21 )             32.6     08-15    142  |  110<H>  |  49<H>  ----------------------------<  121<H>  3.8   |  26  |  1.28    Ca    9.1      15 Aug 2022 05:34  Phos  3.6     08-15  Mg     2.3     08-15    TPro  7.4  /  Alb  4.0  /  TBili  0.5  /  DBili  x   /  AST  16  /  ALT  51  /  AlkPhos  79  08-13    LIVER FUNCTIONS - ( 13 Aug 2022 23:11 )  Alb: 4.0 g/dL / Pro: 7.4 gm/dL / ALK PHOS: 79 U/L / ALT: 51 U/L / AST: 16 U/L / GGT: x           PT/INR - ( 13 Aug 2022 23:11 )   PT: 12.9 sec;   INR: 1.11 ratio    PTT - ( 15 Aug 2022 08:21 )  PTT:64.9 sec    ABG - ( 14 Aug 2022 06:53 )  pH, Arterial: 7.42  pH, Blood: x     /  pCO2: 35    /  pO2: 254   / HCO3: 23    / Base Excess: -1.3  /  SaO2: 100       MEDICATIONS  (STANDING):  acyclovir   Oral Tab/Cap 400 milliGRAM(s) Oral two times a day  aspirin enteric coated 81 milliGRAM(s) Oral daily  atorvastatin 40 milliGRAM(s) Oral at bedtime  calcium carbonate    500 mG (Tums) Chewable 2 Tablet(s) Chew at bedtime  clopidogrel Tablet 75 milliGRAM(s) Oral daily  ferrous    sulfate 325 milliGRAM(s) Oral daily  furosemide   Injectable 20 milliGRAM(s) IV Push two times a day  levothyroxine 100 MICROGram(s) Oral daily  sacubitril 24 mG/valsartan 26 mG 1 Tablet(s) Oral two times a day    MEDICATIONS  (PRN):  acetaminophen     Tablet .. 650 milliGRAM(s) Oral every 6 hours PRN Temp greater or equal to 38C (100.4F), Mild Pain (1 - 3)  ALBUTerol    90 MICROgram(s) HFA Inhaler 2 Puff(s) Inhalation every 6 hours PRN Shortness of Breath and/or Wheezing  aluminum hydroxide/magnesium hydroxide/simethicone Suspension 30 milliLiter(s) Oral every 4 hours PRN Dyspepsia  ondansetron Injectable 4 milliGRAM(s) IV Push every 8 hours PRN Nausea and/or Vomiting

## 2022-08-16 LAB
ANION GAP SERPL CALC-SCNC: 5 MMOL/L — SIGNIFICANT CHANGE UP (ref 5–17)
B BURGDOR C6 AB SER-ACNC: NEGATIVE — SIGNIFICANT CHANGE UP
B BURGDOR IGG+IGM SER-ACNC: 0.03 INDEX — SIGNIFICANT CHANGE UP (ref 0.01–0.89)
BUN SERPL-MCNC: 55 MG/DL — HIGH (ref 7–23)
CALCIUM SERPL-MCNC: 9.4 MG/DL — SIGNIFICANT CHANGE UP (ref 8.5–10.1)
CHLORIDE SERPL-SCNC: 114 MMOL/L — HIGH (ref 96–108)
CO2 SERPL-SCNC: 26 MMOL/L — SIGNIFICANT CHANGE UP (ref 22–31)
CREAT SERPL-MCNC: 1 MG/DL — SIGNIFICANT CHANGE UP (ref 0.5–1.3)
EGFR: 55 ML/MIN/1.73M2 — LOW
GLUCOSE SERPL-MCNC: 117 MG/DL — HIGH (ref 70–99)
HCT VFR BLD CALC: 29.9 % — LOW (ref 34.5–45)
HGB BLD-MCNC: 9.8 G/DL — LOW (ref 11.5–15.5)
MAGNESIUM SERPL-MCNC: 2.4 MG/DL — SIGNIFICANT CHANGE UP (ref 1.6–2.6)
MCHC RBC-ENTMCNC: 32.8 GM/DL — SIGNIFICANT CHANGE UP (ref 32–36)
MCHC RBC-ENTMCNC: 34.1 PG — HIGH (ref 27–34)
MCV RBC AUTO: 104.2 FL — HIGH (ref 80–100)
PHOSPHATE SERPL-MCNC: 2.6 MG/DL — SIGNIFICANT CHANGE UP (ref 2.5–4.5)
PLATELET # BLD AUTO: 200 K/UL — SIGNIFICANT CHANGE UP (ref 150–400)
POTASSIUM SERPL-MCNC: 3.8 MMOL/L — SIGNIFICANT CHANGE UP (ref 3.5–5.3)
POTASSIUM SERPL-SCNC: 3.8 MMOL/L — SIGNIFICANT CHANGE UP (ref 3.5–5.3)
RBC # BLD: 2.87 M/UL — LOW (ref 3.8–5.2)
RBC # FLD: 15.8 % — HIGH (ref 10.3–14.5)
SODIUM SERPL-SCNC: 145 MMOL/L — SIGNIFICANT CHANGE UP (ref 135–145)
WBC # BLD: 8.62 K/UL — SIGNIFICANT CHANGE UP (ref 3.8–10.5)
WBC # FLD AUTO: 8.62 K/UL — SIGNIFICANT CHANGE UP (ref 3.8–10.5)

## 2022-08-16 PROCEDURE — 99232 SBSQ HOSP IP/OBS MODERATE 35: CPT

## 2022-08-16 PROCEDURE — 33225 L VENTRIC PACING LEAD ADD-ON: CPT

## 2022-08-16 PROCEDURE — 93010 ELECTROCARDIOGRAM REPORT: CPT

## 2022-08-16 PROCEDURE — 33208 INSRT HEART PM ATRIAL & VENT: CPT | Mod: KX

## 2022-08-16 PROCEDURE — 71045 X-RAY EXAM CHEST 1 VIEW: CPT | Mod: 26

## 2022-08-16 RX ORDER — ACETAMINOPHEN 500 MG
1000 TABLET ORAL ONCE
Refills: 0 | Status: COMPLETED | OUTPATIENT
Start: 2022-08-16 | End: 2022-08-16

## 2022-08-16 RX ORDER — FENTANYL CITRATE 50 UG/ML
50 INJECTION INTRAVENOUS ONCE
Refills: 0 | Status: DISCONTINUED | OUTPATIENT
Start: 2022-08-16 | End: 2022-08-16

## 2022-08-16 RX ORDER — MIDAZOLAM HYDROCHLORIDE 1 MG/ML
2 INJECTION, SOLUTION INTRAMUSCULAR; INTRAVENOUS ONCE
Refills: 0 | Status: DISCONTINUED | OUTPATIENT
Start: 2022-08-16 | End: 2022-08-16

## 2022-08-16 RX ORDER — CEFAZOLIN SODIUM 1 G
2000 VIAL (EA) INJECTION ONCE
Refills: 0 | Status: COMPLETED | OUTPATIENT
Start: 2022-08-16 | End: 2022-08-16

## 2022-08-16 RX ORDER — CEFAZOLIN SODIUM 1 G
1000 VIAL (EA) INJECTION EVERY 8 HOURS
Refills: 0 | Status: COMPLETED | OUTPATIENT
Start: 2022-08-16 | End: 2022-08-17

## 2022-08-16 RX ADMIN — Medication 400 MILLIGRAM(S): at 21:40

## 2022-08-16 RX ADMIN — Medication 400 MILLIGRAM(S): at 12:44

## 2022-08-16 RX ADMIN — ATORVASTATIN CALCIUM 40 MILLIGRAM(S): 80 TABLET, FILM COATED ORAL at 21:40

## 2022-08-16 RX ADMIN — Medication 100 MILLIGRAM(S): at 08:00

## 2022-08-16 RX ADMIN — Medication 81 MILLIGRAM(S): at 13:23

## 2022-08-16 RX ADMIN — Medication 2 TABLET(S): at 21:40

## 2022-08-16 RX ADMIN — Medication 1000 MILLIGRAM(S): at 22:11

## 2022-08-16 RX ADMIN — Medication 1000 MILLIGRAM(S): at 16:17

## 2022-08-16 RX ADMIN — Medication 325 MILLIGRAM(S): at 12:45

## 2022-08-16 RX ADMIN — SACUBITRIL AND VALSARTAN 1 TABLET(S): 24; 26 TABLET, FILM COATED ORAL at 12:44

## 2022-08-16 RX ADMIN — Medication 100 MICROGRAM(S): at 12:45

## 2022-08-16 RX ADMIN — Medication 400 MILLIGRAM(S): at 21:41

## 2022-08-16 RX ADMIN — SACUBITRIL AND VALSARTAN 1 TABLET(S): 24; 26 TABLET, FILM COATED ORAL at 22:00

## 2022-08-16 RX ADMIN — Medication 20 MILLIGRAM(S): at 13:23

## 2022-08-16 NOTE — PROGRESS NOTE ADULT - ASSESSMENT
87F with Pmhx of Maryam'ezequiel Tucker, CHF, admitted with NSTEMI. Underwent cath yesterday, showing non-obs CAD. However the patient had episodes of CHB and had a TVP placed. Today she is now S/P PPM placement(BiVICD).    At this time she is stable to downgrade from the CCU tot the Telemetry floor, to continue her care with  hospitalist service and be followed by Cardiology.       A/P:  1) NSTEMI: S/P Cath with non-obstructive CAD. Maintained on ASA only, due to bleeding risk with Calquence. Cardiology to F/U will continue with GDMT as hemodynamics permit. SCD for DVt prophylaxis. Need to mobilize and have PT evaluate for mobility.    2) CHB: S/P PPM, currenlty in NSR. PPM care as per EPS    3) CHF: Currently not in active CHF. COntinue Entresto and Daily Lasix.    4) Maryam Tucker currently on Calquence, resume care as outpatient per Oncology

## 2022-08-16 NOTE — CDI QUERY NOTE - NSCDIOTHERTXTBX_GEN_ALL_CORE_HH
Clinical documentation indicates that this patient has CHF.      Cardiac consult 8/15: "She was found to be in acute hypoxemic resp failure 2/2 pulm edema/CHF "  "acute hypoxemic resp failure 2/2 acute CHF exacerbation from NSTEMI,"    H+P reveals "  pt and daughter denied hx of CHF "    TTE= "  Estimated left ventricular ejection fraction is about 40 %."  " Pleural effusion - is present."    Medications Cardiovascular (MAR):   furosemide   Injectable 20 milliGRAM(s) IV Push two times a day    Please clarify the Type of Acute CHF exacerbation  a) Systolic (HFrEF)  b) Diastolic (HFpEF)  c) Combined Systolic (HFrEF)  & Diastolic (HFpEF)  d) Other (specify)  e) No clinical evidence of CHF, patient with acute pulmonary edema alone Clinical documentation indicates that this patient has CHF.      Cardiac consult 8/15: "She was found to be in acute hypoxemic resp failure 2/2 pulm edema/CHF "  "acute hypoxemic resp failure 2/2 acute CHF exacerbation from NSTEMI,"    H+P reveals "  pt and daughter denied hx of CHF "    TTE= "  Estimated left ventricular ejection fraction is about 40 %."  " Pleural effusion - is present."    Medications Cardiovascular (MAR):   furosemide   Injectable 20 milliGRAM(s) IV Push two times a day    Please clarify the Type of Acute CHF exacerbation  a) Systolic (HFrEF)  b) Diastolic (HFpEF)  c) Combined Systolic (HFrEF)  & Diastolic (HFpEF)  d) Other (specify)  e) No clinical evidence of CHF, patient with acute pulmonary edema alone    and please clarify the acuity   a) Acute CHF, now resolved ( received IV lasix x 1 )  b) Acute on chronic CHF  c) Chronic CHF  d) Other, please clarify

## 2022-08-16 NOTE — PROGRESS NOTE ADULT - SUBJECTIVE AND OBJECTIVE BOX
Patient is a 87y old  Female who presents with a chief complaint of Acute hypoxic resp failure  Acute pulmonary edema (16 Aug 2022 07:34)      BRIEF HOSPITAL COURSE: 87F with Pmhx of Waldenstrom's Dz, CHF, admitted with NSTEMI. underwent cath showing non-obs CAD. However the patient had episodes of CHB and had a TVP placed. Today she is now S/P PPM placement(BiVICD).    Awake, alert and comfortable.      PAST MEDICAL & SURGICAL HISTORY:  Hypertension      Waldenstrom&#x27;s disease      HLD (hyperlipidemia)      CAD (coronary artery disease)      Hypothyroidism      S/P hysterectomy          Review of Systems:  CONSTITUTIONAL: No fever, chills, or fatigue  EYES: No eye pain, visual disturbances, or discharge  ENMT:  No difficulty hearing, tinnitus, vertigo; No sinus or throat pain  NECK: No pain or stiffness  RESPIRATORY: No cough, wheezing, chills or hemoptysis; No shortness of breath  CARDIOVASCULAR: No chest pain, palpitations, dizziness, or leg swelling  GASTROINTESTINAL: No abdominal or epigastric pain. No nausea, vomiting, or hematemesis; No diarrhea or constipation. No melena or hematochezia.  GENITOURINARY: No dysuria, frequency, hematuria, or incontinence  NEUROLOGICAL: No headaches, memory loss, loss of strength, numbness, or tremors  SKIN: No itching, burning, rashes, or lesions   MUSCULOSKELETAL: No joint pain or swelling; No muscle, back, or extremity pain  PSYCHIATRIC: No depression, anxiety, mood swings, or difficulty sleeping      Medications:  acyclovir   Oral Tab/Cap 400 milliGRAM(s) Oral two times a day  ceFAZolin  Injectable. 1000 milliGRAM(s) IV Push every 8 hours    furosemide   Injectable 20 milliGRAM(s) IV Push two times a day  sacubitril 24 mG/valsartan 26 mG 1 Tablet(s) Oral two times a day    ALBUTerol    90 MICROgram(s) HFA Inhaler 2 Puff(s) Inhalation every 6 hours PRN    acetaminophen     Tablet .. 650 milliGRAM(s) Oral every 6 hours PRN  acetaminophen   IVPB .. 1000 milliGRAM(s) IV Intermittent once  fentaNYL    Injectable 50 MICROGram(s) IV Push once  midazolam Injectable 2 milliGRAM(s) IV Push once  ondansetron Injectable 4 milliGRAM(s) IV Push every 8 hours PRN      aspirin enteric coated 81 milliGRAM(s) Oral daily    aluminum hydroxide/magnesium hydroxide/simethicone Suspension 30 milliLiter(s) Oral every 4 hours PRN  calcium carbonate    500 mG (Tums) Chewable 2 Tablet(s) Chew at bedtime      atorvastatin 40 milliGRAM(s) Oral at bedtime  levothyroxine 100 MICROGram(s) Oral daily    ferrous    sulfate 325 milliGRAM(s) Oral daily                ICU Vital Signs Last 24 Hrs  T(C): 36.6 (16 Aug 2022 07:22), Max: 36.8 (15 Aug 2022 16:33)  T(F): 97.8 (16 Aug 2022 07:22), Max: 98.3 (15 Aug 2022 16:33)  HR: 87 (16 Aug 2022 15:00) (39 - 121)  BP: 106/48 (16 Aug 2022 14:00) (83/63 - 159/67)  BP(mean): 63 (16 Aug 2022 14:00) (50 - 101)  ABP: --  ABP(mean): --  RR: 15 (16 Aug 2022 15:00) (11 - 29)  SpO2: 91% (16 Aug 2022 15:00) (89% - 99%)    O2 Parameters below as of 16 Aug 2022 13:00  Patient On (Oxygen Delivery Method): nasal cannula  O2 Flow (L/min): 2              I&O's Detail    15 Aug 2022 07:01  -  16 Aug 2022 07:00  --------------------------------------------------------  IN:  Total IN: 0 mL    OUT:    Voided (mL): 300 mL  Total OUT: 300 mL    Total NET: -300 mL            LABS:                        9.8    8.62  )-----------( 200      ( 16 Aug 2022 06:37 )             29.9     08-16    145  |  114<H>  |  55<H>  ----------------------------<  117<H>  3.8   |  26  |  1.00    Ca    9.4      16 Aug 2022 06:37  Phos  2.6     08-16  Mg     2.4     08-16            CAPILLARY BLOOD GLUCOSE        PTT - ( 15 Aug 2022 08:21 )  PTT:64.9 sec    CULTURES:  Culture Results:   No growth to date. (08-13-22 @ 23:43)  Culture Results:   No growth to date. (08-13-22 @ 23:43)  Rapid RVP Result: NotDetec (08-13-22 @ 23:11)      Physical Examination:    General: No acute distress.  Alert, oriented, interactive, nonfocal, following simple commands and moving all extremities     HEENT: Pupils equal, reactive to light.  Symmetric.    PULM: Breathing comfortably good BS B/L with no Rales or Rhonchi, no significant sputum production    CVS: Regular rate and rhythm, no murmurs, rubs, or gallops    ABD: BS+ Soft, nondistended, nontender, no masses    EXT: No edema, nontender    SKIN: Warm and well perfused, no rashes noted.    RADIOLOGY: NA

## 2022-08-16 NOTE — PROGRESS NOTE ADULT - SUBJECTIVE AND OBJECTIVE BOX
INTERVAL HPI/OVERNIGHT EVENTS:  Patient S&E at bedside. Yesterday, pt had cardiac cath- nonobstructive CAD  plan for permanent PPM today by EP    PAST MEDICAL & SURGICAL HISTORY:  Hypertension      Waldenstrom&#x27;s disease      HLD (hyperlipidemia)      CAD (coronary artery disease)      Hypothyroidism      S/P hysterectomy          FAMILY HISTORY:  No pertinent family history in first degree relatives        VITAL SIGNS:  T(F): 97.8 (08-16-22 @ 07:22)  HR: 41 (08-16-22 @ 07:22)  BP: 156/57 (08-16-22 @ 07:22)  RR: 16 (08-16-22 @ 07:22)  SpO2: 99% (08-16-22 @ 07:22)  Wt(kg): --    PHYSICAL EXAM:    Constitutional: NAD  Eyes: EOMI, sclera non-icteric  Neck: supple, no masses, no JVD  Respiratory: CTA b/l, good air entry b/l  Cardiovascular: RRR, no M/R/G  Gastrointestinal: soft, NTND, no masses palpable, + BS, no hepatosplenomegaly  Extremities: no c/c/e  Neurological: AAOx3      MEDICATIONS  (STANDING):  acyclovir   Oral Tab/Cap 400 milliGRAM(s) Oral two times a day  aspirin enteric coated 81 milliGRAM(s) Oral daily  atorvastatin 40 milliGRAM(s) Oral at bedtime  calcium carbonate    500 mG (Tums) Chewable 2 Tablet(s) Chew at bedtime  ceFAZolin   IVPB 2000 milliGRAM(s) IV Intermittent once  clopidogrel Tablet 75 milliGRAM(s) Oral daily  ferrous    sulfate 325 milliGRAM(s) Oral daily  furosemide   Injectable 20 milliGRAM(s) IV Push two times a day  levothyroxine 100 MICROGram(s) Oral daily  sacubitril 24 mG/valsartan 26 mG 1 Tablet(s) Oral two times a day    MEDICATIONS  (PRN):  acetaminophen     Tablet .. 650 milliGRAM(s) Oral every 6 hours PRN Temp greater or equal to 38C (100.4F), Mild Pain (1 - 3)  ALBUTerol    90 MICROgram(s) HFA Inhaler 2 Puff(s) Inhalation every 6 hours PRN Shortness of Breath and/or Wheezing  aluminum hydroxide/magnesium hydroxide/simethicone Suspension 30 milliLiter(s) Oral every 4 hours PRN Dyspepsia  ondansetron Injectable 4 milliGRAM(s) IV Push every 8 hours PRN Nausea and/or Vomiting      Allergies    No Known Allergies    Intolerances        LABS:                        9.8    8.62  )-----------( 200      ( 16 Aug 2022 06:37 )             29.9     08-16    145  |  114<H>  |  55<H>  ----------------------------<  117<H>  3.8   |  26  |  1.00    Ca    9.4      16 Aug 2022 06:37  Phos  2.6     08-16  Mg     2.4     08-16      PTT - ( 15 Aug 2022 08:21 )  PTT:64.9 sec      RADIOLOGY & ADDITIONAL TESTS:  Studies reviewed.   INTERVAL HPI/OVERNIGHT EVENTS:  Patient S&E at bedside. Yesterday, pt had cardiac cath- nonobstructive CAD  plan for permanent PPM today by EP    PAST MEDICAL & SURGICAL HISTORY:  Hypertension      Waldenstrom&#x27;s disease      HLD (hyperlipidemia)      CAD (coronary artery disease)      Hypothyroidism      S/P hysterectomy          FAMILY HISTORY:  No pertinent family history in first degree relatives        VITAL SIGNS:  T(F): 97.8 (08-16-22 @ 07:22)  HR: 41 (08-16-22 @ 07:22)  BP: 156/57 (08-16-22 @ 07:22)  RR: 16 (08-16-22 @ 07:22)  SpO2: 99% (08-16-22 @ 07:22)  Wt(kg): --    PHYSICAL EXAM:    Constitutional: NAD  Eyes: EOMI,  Neck: supple, no masses, no JVD  Respiratory: no wheezing or rhonchi  Cardiovascular: bradycardic,  Gastrointestinal: soft, NTND  Extremities: no c/c/e  Neurological: AAOx3      MEDICATIONS  (STANDING):  acyclovir   Oral Tab/Cap 400 milliGRAM(s) Oral two times a day  aspirin enteric coated 81 milliGRAM(s) Oral daily  atorvastatin 40 milliGRAM(s) Oral at bedtime  calcium carbonate    500 mG (Tums) Chewable 2 Tablet(s) Chew at bedtime  ceFAZolin   IVPB 2000 milliGRAM(s) IV Intermittent once  clopidogrel Tablet 75 milliGRAM(s) Oral daily  ferrous    sulfate 325 milliGRAM(s) Oral daily  furosemide   Injectable 20 milliGRAM(s) IV Push two times a day  levothyroxine 100 MICROGram(s) Oral daily  sacubitril 24 mG/valsartan 26 mG 1 Tablet(s) Oral two times a day    MEDICATIONS  (PRN):  acetaminophen     Tablet .. 650 milliGRAM(s) Oral every 6 hours PRN Temp greater or equal to 38C (100.4F), Mild Pain (1 - 3)  ALBUTerol    90 MICROgram(s) HFA Inhaler 2 Puff(s) Inhalation every 6 hours PRN Shortness of Breath and/or Wheezing  aluminum hydroxide/magnesium hydroxide/simethicone Suspension 30 milliLiter(s) Oral every 4 hours PRN Dyspepsia  ondansetron Injectable 4 milliGRAM(s) IV Push every 8 hours PRN Nausea and/or Vomiting      Allergies    No Known Allergies    Intolerances        LABS:                        9.8    8.62  )-----------( 200      ( 16 Aug 2022 06:37 )             29.9     08-16    145  |  114<H>  |  55<H>  ----------------------------<  117<H>  3.8   |  26  |  1.00    Ca    9.4      16 Aug 2022 06:37  Phos  2.6     08-16  Mg     2.4     08-16      PTT - ( 15 Aug 2022 08:21 )  PTT:64.9 sec      RADIOLOGY & ADDITIONAL TESTS:  Studies reviewed.

## 2022-08-16 NOTE — PROCEDURE NOTE - NSICDXPROCEDURE_GEN_ALL_CORE_FT
PROCEDURES:  Implantation of intravenous biventricular cardiac pacemaker 16-Aug-2022 11:37:18  César Cespedes

## 2022-08-16 NOTE — PROGRESS NOTE ADULT - ASSESSMENT
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 Keyes at [facility], [place], HLD, HTN, hypothyroid came to ED by family c/o difficulty breathing and pain to arms and back now with concern for NSTEMI.     # Waldenstroms Macroglobulinemia   - Yoko Erika macroglobulinemia (bone marrow biopsy 12/15/2020 showing extensive low-grade B-cell lymphoproliferative infiltrate; IgM 5g), treated in ambulatory with weekly dexamethasone/bortezomib at [facility] and then transitioned to [facility]  - pt has been on calquence with very good response, IgM 260 in 7/2022 (was over 5000 at diagnosis)  - Calquence known to cause hemorrhage as well as afib/aflutter  - will continue to HOLD Acalabrutinib     # complete heart block  - WBC 9.6, Hb 11.1, plt 222, troponin 143->432->962->1478 today   - bnp 14,473- on lasix  - CTA negative for PE, findings suggest pulmonary edema w b/l pleural effusions, ill defined subcarinal adenopathy- now on zosyn  - TTE with severe TR, severe pulm HTN, severe hypokinesis of anterior wall, apex severely hypokinetic with EF 40%  - cardio consult- left heart cath with non-obstructive CAD  - on asa, plavix, hep gtt, atorvastatin, - monitor for bleeding- no BB due to bradycardia  - seen by EP- pacemaker to be placed today     Dr. Wale Beck  cell: [phone]  Weekends and nights please call [phone] for MD on call  [facility]  Hematology/Oncology

## 2022-08-16 NOTE — CHART NOTE - NSCHARTNOTEFT_GEN_A_CORE
Unable to see patient since she was in cath lab getting her BiVICD    Updated:  -Since last note patient underwent a LHC showing nonischemic CAD.  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  -She is a high risk for bleeding with her CA and chemotherapy so I have D/Sunday her plavix but recommend continuing ASA 81 and statin.  -s/p BiVICD - Once stable and confirmed working well would recommend starting on BB. If BP can allow recommend starting on coreg 3.125mg BID. IF BP soft would recommend Toprol XL 25 and uptitrate these medications as she can tolerate.  -C/W entresto  -She remains fluid overloaded with her RHC showing significantly elevated pressures/wedge. C/W diuresis with goal for her to be net negative 1.5-2L in the next 24 hours.    Thank you for allowing me to partake in the care of this patient. I will be going away and won't be back until 8/22 but one of my partners will take over her cardiac care. If in the meantime you have any cardiac questions, please do not hesitate to call me.
Patient s/p CRT-P  Called to CCU to evaluate incision.  Patient noted to have ecchymosis at site.  Site is soft, non tender  Plavix discontinued  Continue to monitor

## 2022-08-17 LAB
ADD ON TEST-SPECIMEN IN LAB: SIGNIFICANT CHANGE UP
ANION GAP SERPL CALC-SCNC: 6 MMOL/L — SIGNIFICANT CHANGE UP (ref 5–17)
BASOPHILS # BLD AUTO: 0.03 K/UL — SIGNIFICANT CHANGE UP (ref 0–0.2)
BASOPHILS NFR BLD AUTO: 0.3 % — SIGNIFICANT CHANGE UP (ref 0–2)
BUN SERPL-MCNC: 43 MG/DL — HIGH (ref 7–23)
CALCIUM SERPL-MCNC: 9.2 MG/DL — SIGNIFICANT CHANGE UP (ref 8.5–10.1)
CHLORIDE SERPL-SCNC: 113 MMOL/L — HIGH (ref 96–108)
CO2 SERPL-SCNC: 27 MMOL/L — SIGNIFICANT CHANGE UP (ref 22–31)
CREAT SERPL-MCNC: 0.85 MG/DL — SIGNIFICANT CHANGE UP (ref 0.5–1.3)
EGFR: 66 ML/MIN/1.73M2 — SIGNIFICANT CHANGE UP
EOSINOPHIL # BLD AUTO: 0.11 K/UL — SIGNIFICANT CHANGE UP (ref 0–0.5)
EOSINOPHIL NFR BLD AUTO: 1.2 % — SIGNIFICANT CHANGE UP (ref 0–6)
GLUCOSE SERPL-MCNC: 120 MG/DL — HIGH (ref 70–99)
HCT VFR BLD CALC: 30.9 % — LOW (ref 34.5–45)
HGB BLD-MCNC: 10.2 G/DL — LOW (ref 11.5–15.5)
IMM GRANULOCYTES NFR BLD AUTO: 0.3 % — SIGNIFICANT CHANGE UP (ref 0–1.5)
LYMPHOCYTES # BLD AUTO: 1.55 K/UL — SIGNIFICANT CHANGE UP (ref 1–3.3)
LYMPHOCYTES # BLD AUTO: 17.1 % — SIGNIFICANT CHANGE UP (ref 13–44)
MCHC RBC-ENTMCNC: 33 GM/DL — SIGNIFICANT CHANGE UP (ref 32–36)
MCHC RBC-ENTMCNC: 34.7 PG — HIGH (ref 27–34)
MCV RBC AUTO: 105.1 FL — HIGH (ref 80–100)
MONOCYTES # BLD AUTO: 1.29 K/UL — HIGH (ref 0–0.9)
MONOCYTES NFR BLD AUTO: 14.2 % — HIGH (ref 2–14)
NEUTROPHILS # BLD AUTO: 6.08 K/UL — SIGNIFICANT CHANGE UP (ref 1.8–7.4)
NEUTROPHILS NFR BLD AUTO: 66.9 % — SIGNIFICANT CHANGE UP (ref 43–77)
PLATELET # BLD AUTO: 195 K/UL — SIGNIFICANT CHANGE UP (ref 150–400)
POTASSIUM SERPL-MCNC: 3.9 MMOL/L — SIGNIFICANT CHANGE UP (ref 3.5–5.3)
POTASSIUM SERPL-SCNC: 3.9 MMOL/L — SIGNIFICANT CHANGE UP (ref 3.5–5.3)
RBC # BLD: 2.94 M/UL — LOW (ref 3.8–5.2)
RBC # FLD: 15.5 % — HIGH (ref 10.3–14.5)
SODIUM SERPL-SCNC: 146 MMOL/L — HIGH (ref 135–145)
WBC # BLD: 9.09 K/UL — SIGNIFICANT CHANGE UP (ref 3.8–10.5)
WBC # FLD AUTO: 9.09 K/UL — SIGNIFICANT CHANGE UP (ref 3.8–10.5)

## 2022-08-17 PROCEDURE — 99232 SBSQ HOSP IP/OBS MODERATE 35: CPT

## 2022-08-17 PROCEDURE — 93010 ELECTROCARDIOGRAM REPORT: CPT

## 2022-08-17 RX ORDER — CARVEDILOL PHOSPHATE 80 MG/1
3.12 CAPSULE, EXTENDED RELEASE ORAL EVERY 12 HOURS
Refills: 0 | Status: DISCONTINUED | OUTPATIENT
Start: 2022-08-17 | End: 2022-08-19

## 2022-08-17 RX ADMIN — Medication 100 MICROGRAM(S): at 06:07

## 2022-08-17 RX ADMIN — Medication 400 MILLIGRAM(S): at 22:37

## 2022-08-17 RX ADMIN — SACUBITRIL AND VALSARTAN 1 TABLET(S): 24; 26 TABLET, FILM COATED ORAL at 22:38

## 2022-08-17 RX ADMIN — Medication 20 MILLIGRAM(S): at 06:07

## 2022-08-17 RX ADMIN — CARVEDILOL PHOSPHATE 3.12 MILLIGRAM(S): 80 CAPSULE, EXTENDED RELEASE ORAL at 22:38

## 2022-08-17 RX ADMIN — Medication 325 MILLIGRAM(S): at 09:28

## 2022-08-17 RX ADMIN — ATORVASTATIN CALCIUM 40 MILLIGRAM(S): 80 TABLET, FILM COATED ORAL at 22:38

## 2022-08-17 RX ADMIN — Medication 650 MILLIGRAM(S): at 22:44

## 2022-08-17 RX ADMIN — Medication 400 MILLIGRAM(S): at 09:28

## 2022-08-17 RX ADMIN — Medication 1000 MILLIGRAM(S): at 01:01

## 2022-08-17 RX ADMIN — Medication 2 TABLET(S): at 22:38

## 2022-08-17 RX ADMIN — Medication 81 MILLIGRAM(S): at 09:28

## 2022-08-17 RX ADMIN — SACUBITRIL AND VALSARTAN 1 TABLET(S): 24; 26 TABLET, FILM COATED ORAL at 09:28

## 2022-08-17 RX ADMIN — Medication 20 MILLIGRAM(S): at 15:27

## 2022-08-17 RX ADMIN — CARVEDILOL PHOSPHATE 3.12 MILLIGRAM(S): 80 CAPSULE, EXTENDED RELEASE ORAL at 16:17

## 2022-08-17 NOTE — PROGRESS NOTE ADULT - ASSESSMENT
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 Keyes at [facility], CAD, HLD, HTN, hypothyroid came to ED by family c/o difficulty breathing and pain to arms and back now with concern for NSTEMI.     # Waldenstroms Macroglobulinemia   - Yoko Erika macroglobulinemia (bone marrow biopsy 12/15/2020 showing extensive low-grade B-cell lymphoproliferative infiltrate; IgM 5g), treated in ambulatory with weekly dexamethasone/bortezomib at [facility] and then transitioned to [facility]  - pt has been on calquence with very good response, IgM 260 in 7/2022 (was over 5000 at diagnosis)  - Calquence known to cause hemorrhage as well as afib/aflutter  - will continue to HOLD Acalabrutinib   - pt to f/u with [facility] outpatient to discuss    # complete heart block  - as per cadio, elevated trops likely 2/2 myocarditis leading to cM and CHF  - CTA negative for PE, findings suggest pulmonary edema w b/l pleural effusions, ill defined subcarinal adenopathy- now on zosyn  - TTE with severe TR, severe pulm HTN, severe hypokinesis of anterior wall, apex severely hypokinetic with EF 40%  - left heart cath with non-obstructive CAD  - on asa, atorvastatin, - monitor for bleeding- no plavix due to high risk bleeding from calquence, BB to be added later if BP can allow with coreg 3.125 as per cardio  - BiV icd placed 8/16    Dr. Wale Beck  cell: [phone]  Weekends and nights please call [phone] for MD on call  [facility]  Hematology/Oncology  87 year old female w hx Waldenstrom disease on calquence followed by Dr. Keyes at [facility] [place] [place], CAD, HLD, HTN, hypothyroid came to ED by family c/o difficulty breathing and pain to arms and back now with concern for NSTEMI.     # Waldenstroms Macroglobulinemia   - Yoko Erika macroglobulinemia (bone marrow biopsy 12/15/2020 showing extensive low-grade B-cell lymphoproliferative infiltrate; IgM 5g), treated in ambulatory with weekly dexamethasone/bortezomib at [facility] and then transitioned to [facility] [place], [place]  - pt has been on calquence with very good response, IgM 260 in 7/2022 (was over 5000 at diagnosis)  - Calquence known to cause hemorrhage as well as afib/aflutter  - will continue to HOLD Acalabrutinib   - [place] to [place] [facility] [place] discuss    # complete heart block  - as per cadio, elevated trops likely 2/2 myocarditis leading to cM and CHF  - CTA negative for PE, findings suggest pulmonary edema w b/l pleural effusions, ill defined subcarinal adenopathy- now on zosyn  - TTE with severe TR, severe pulm HTN, severe hypokinesis of anterior wall, apex severely hypokinetic with EF 40%  - left heart cath with non-obstructive CAD  - on asa, atorvastatin, - monitor for bleeding- no plavix due to high risk bleeding from calquence, BB to be added if BP can allow with coreg 3.125 as per cardio  - BiV icd placed 8/16    Dr. Wale Beck  cell: [phone]  Weekends and nights please call [phone] for MD on call  [facility]  Hematology/Oncology  87 year old female w hx Waldenstrom disease on calquence followed by Dr. Keyes at [facility] [place], CAD, HLD, HTN, hypothyroid came to ED by family c/o difficulty breathing and pain to arms and back now with concern for NSTEMI.     # Waldenstroms Macroglobulinemia   - Yoko Erika macroglobulinemia (bone marrow biopsy 12/15/2020 showing extensive low-grade B-cell lymphoproliferative infiltrate; IgM 5g), treated in ambulatory with weekly dexamethasone/bortezomib at [facility] and then transitioned to [facility]  - pt has been on calquence with very good response, IgM 260 in 7/2022 (was over 5000 at diagnosis)  - Calquence known to cause hemorrhage as well as afib/aflutter  - will continue to HOLD Acalabrutinib   - pt to f/u with [facility] outpatient to discuss    # complete heart block  - as per cadio, elevated trops likely 2/2 myocarditis leading to cM and CHF  - CTA negative for PE, findings suggest pulmonary edema w b/l pleural effusions, ill defined subcarinal adenopathy- now on zosyn  - TTE with severe TR, severe pulm HTN, severe hypokinesis of anterior wall, apex severely hypokinetic with EF 40%  - left heart cath with non-obstructive CAD  - on asa, atorvastatin, - monitor for bleeding- no plavix due to high risk bleeding from calquence, BB to be added if BP can allow with coreg 3.125 as per cardio  - BiV ppm placed 8/16    Dr. Wale Beck  cell: [phone]  Weekends and nights please call [phone] for MD on call  [facility]  Hematology/Oncology  87 year old female w hx Waldenstrom disease on calquence followed by Dr. Keyes at [facility], CAD, HLD, HTN, hypothyroid came to ED by family c/o difficulty breathing and pain to arms and back now with concern for CHb with nonobstructive cad, now s/p BiV PPM.     # Waldenstroms Macroglobulinemia   - Yoko Erika macroglobulinemia (bone marrow biopsy 12/15/2020 showing extensive low-grade B-cell lymphoproliferative infiltrate; IgM 5g), treated in ambulatory with weekly dexamethasone/bortezomib at [facility] and then transitioned to [facility]  - pt has been on calquence with very good response, IgM 260 in 7/2022 (was over 5000 at diagnosis)  - Calquence known to cause hemorrhage as well as afib/aflutter  - will continue to HOLD Acalabrutinib   - [place] [facility] [place]    # complete heart block  - as per cadio, elevated trops likely 2/2 myocarditis leading to cM and CHF  - CTA negative for PE, findings suggest pulmonary edema w b/l pleural effusions, ill defined subcarinal adenopathy- now on zosyn  - TTE with severe TR, severe pulm HTN, severe hypokinesis of anterior wall, apex severely hypokinetic with EF 40%  - left heart cath with non-obstructive CAD  - on asa, atorvastatin, - monitor for bleeding- no plavix due to high risk bleeding from calquence, BB to be added if BP can allow with coreg 3.125 as per cardio  - BiV ppm placed 8/16    Dr. Wale Beck  cell: [phone]  Weekends and nights please call [phone] for MD on call  [facility]  Hematology/Oncology

## 2022-08-17 NOTE — PROGRESS NOTE ADULT - ASSESSMENT
87F with PMH of Maryam'ezequiel Tucker, CHF, admitted with NSTEMI. Underwent cath yesterday, showing non-obs CAD. However the patient had episodes of CHB and had a TVP placed. She is now S/P PPM placement(BiVICD).  Patient is now  downgraded to Tele.      A/P:  NSTEMI: S/P Cath with non-obstructive CAD. Maintained on ASA only, due to bleeding risk with Calquence. Cardiology to F/U will continue with GDMT as hemodynamics permit. SCD for DVt prophylaxis. Need to mobilize and have PT evaluate for mobility.  CHB: S/P PPM, currenlty in NSR. PPM care as per EPS  HTN CAD CHF: Currently not in active CHF. COntinue Entresto and Daily Lasix.  Maryam Tucker currently on Calquence, resume care as outpatient per Oncology   Hypothyroidism     87[facility], [facility], admitted with NSTEMI. Underwent cath yesterday, showing non-obs CAD. However the patient had episodes of CHB and had a TVP placed. She is now S/P PPM placement(BiVICD).  Patient is now  downgraded to Tele.      A/P:  NSTEMI: S/P Cath with non-obstructive CAD. Maintained on ASA only, due to bleeding risk with Calquence. Cardiology to F/U will continue with GDMT as hemodynamics permit. SCD for DVt prophylaxis. Need to mobilize and have PT evaluate for mobility.  CHB: S/P PPM, currenlty in NSR. PPM care as per EPS  HTN CAD CHF: The patient has acute CHF - mixed - systolic and diastolic heart failure - to continue Entresto and IV Lasix BID  [facility] currently on Calquence, resume care as outpatient per Oncology   Hypothyroidism     87F with PMH of Waldenstrom's Dz, CHF, admitted with NSTEMI. Underwent cath yesterday, showing non-obs CAD. However the patient had episodes of CHB and had a TVP placed. She is now S/P PPM placement(BiVICD).  Patient is now  downgraded to Tele.      A/P:  NSTEMI: S/P Cath with non-obstructive CAD. Maintained on ASA only, due to bleeding risk with Calquence.   Need to mobilize and have PT evaluate for mobility.  CHB: S/P PPM, currenlty in NSR. can downgrade to telemetry floor   add coreg as discussed with Card   f/u with EPS for PPM care  HTN CAD CHF: The patient has acute CHF - mixed - systolic and diastolic heart failure - to continue Entresto and IV Lasix BID  started on BB, low salt diet, daily weights, 1.5 L fluid restriction  transition to PO lasix probably tmr   Waldenstrom Disease currently on Calquence, resume care as outpatient per Oncology   Hypothyroidism - cont levothyroxine     VTE Px - SCDs

## 2022-08-17 NOTE — PROGRESS NOTE ADULT - ASSESSMENT
87 year old female w hx Waldenstrom disease (NHL) on calquence,, CAD, HLD, HTN, hypothyroid  came to ED by family c/o difficulty breathing and pain to arms and back   NSTEMI and CHB , non obstructive CAD on LHC, non ischemic CMP LVEF 40%, required TVP.      A/P: NSTEMI, CHB, NICM 40%, now s/p bi-v PPM implant and removal of TVP, POD #1.  Post op instructions reviewed with patient who verbalized her understanding.  She is still requiring IV Lasix BID  Continue GDMT for HFrEF  F/U as outpatient in EP clinic in 2 weeks for wound check and interrogation.  Device interrogation this am revealed episode PMT overnight, also she was LV optimized to avoid diaphragmatic stimulation.  Abbott: Quadra Assura CRT-P  Mode: DDD @ 60 bpm  A and V lead impedances and thresholds are WNL  LV configuration Mid3 to Can  Stable from EP standpoint.  She is being downgraded to tele today, followed by hospitalist team.  OOB to chair and ambulate.    Plan discussed with patient, CCU team and Dr. Sharp

## 2022-08-17 NOTE — PROGRESS NOTE ADULT - ASSESSMENT
88 yo F with above PMHx who presents because feeling unwell and SOB found to be in acute hypoxemic resp failure 2/2 acute CHF exacerbation from NSTEMI, complete heart block/second degree type 2 AV block. s/p BiP    -Echo showing severe pulm HTN with dilated and noncollapsing IVC with pleural effusion - likely from her dCHF exacerbation from her ischemia  Continue lasix IV BID and entresto  s/p BIVP--- in SR, but not BIV pacing-- recommend starting coreg for both heart failre and to ensure pacing    -C/w ASA,  Statin

## 2022-08-17 NOTE — PROGRESS NOTE ADULT - SUBJECTIVE AND OBJECTIVE BOX
INTERVAL HPI/OVERNIGHT EVENTS:  Patient S&E at bedside. BiV ICD placed yesterday without complications.     PAST MEDICAL & SURGICAL HISTORY:  Hypertension      Waldenstrom&#x27;s disease      HLD (hyperlipidemia)      CAD (coronary artery disease)      Hypothyroidism      S/P hysterectomy          FAMILY HISTORY:  No pertinent family history in first degree relatives        VITAL SIGNS:  T(F): 99.7 (08-16-22 @ 23:00)  HR: 64 (08-17-22 @ 05:00)  BP: 127/66 (08-17-22 @ 02:00)  RR: 17 (08-17-22 @ 05:00)  SpO2: 96% (08-17-22 @ 05:00)  Wt(kg): --    PHYSICAL EXAM:    Constitutional: NAD  Eyes: EOMI, sclera non-icteric  Neck: supple, no masses, no JVD  Respiratory: CTA b/l, good air entry b/l  Cardiovascular: RRR, no M/R/G  Gastrointestinal: soft, NTND, no masses palpable, + BS, no hepatosplenomegaly  Extremities: no c/c/e  Neurological: AAOx3      MEDICATIONS  (STANDING):  acyclovir   Oral Tab/Cap 400 milliGRAM(s) Oral two times a day  aspirin enteric coated 81 milliGRAM(s) Oral daily  atorvastatin 40 milliGRAM(s) Oral at bedtime  calcium carbonate    500 mG (Tums) Chewable 2 Tablet(s) Chew at bedtime  ferrous    sulfate 325 milliGRAM(s) Oral daily  furosemide   Injectable 20 milliGRAM(s) IV Push two times a day  levothyroxine 100 MICROGram(s) Oral daily  sacubitril 24 mG/valsartan 26 mG 1 Tablet(s) Oral two times a day    MEDICATIONS  (PRN):  acetaminophen     Tablet .. 650 milliGRAM(s) Oral every 6 hours PRN Temp greater or equal to 38C (100.4F), Mild Pain (1 - 3)  ALBUTerol    90 MICROgram(s) HFA Inhaler 2 Puff(s) Inhalation every 6 hours PRN Shortness of Breath and/or Wheezing  aluminum hydroxide/magnesium hydroxide/simethicone Suspension 30 milliLiter(s) Oral every 4 hours PRN Dyspepsia  ondansetron Injectable 4 milliGRAM(s) IV Push every 8 hours PRN Nausea and/or Vomiting      Allergies    No Known Allergies    Intolerances        LABS:                        10.2   9.09  )-----------( 195      ( 17 Aug 2022 05:59 )             30.9     08-17    146<H>  |  113<H>  |  43<H>  ----------------------------<  120<H>  3.9   |  27  |  0.85    Ca    9.2      17 Aug 2022 05:59  Phos  2.6     08-16  Mg     2.4     08-16      PTT - ( 15 Aug 2022 08:21 )  PTT:64.9 sec      RADIOLOGY & ADDITIONAL TESTS:  Studies reviewed.   INTERVAL HPI/OVERNIGHT EVENTS:  Patient S&E at bedside. BiV ICD placed yesterday without complications.     PAST MEDICAL & SURGICAL HISTORY:  Hypertension      Waldenstrom&#x27;s disease      HLD (hyperlipidemia)      CAD (coronary artery disease)      Hypothyroidism      S/P hysterectomy          FAMILY HISTORY:  No pertinent family history in first degree relatives        VITAL SIGNS:  T(F): 99.7 (08-16-22 @ 23:00)  HR: 64 (08-17-22 @ 05:00)  BP: 127/66 (08-17-22 @ 02:00)  RR: 17 (08-17-22 @ 05:00)  SpO2: 96% (08-17-22 @ 05:00)  Wt(kg): --    PHYSICAL EXAM:    Constitutional: NAD  Eyes: EOMI, sclera non-icteric  Neck: supple, no masses, no JVD  Respiratory: CTA b/l, good air entry b/l  Cardiovascular: bradycardiac, systolic murmur PPM site healing  Gastrointestinal: soft, NTND, no masses palpable, + BS, no hepatosplenomegaly  Extremities: no c/c/e  Neurological: AAOx3      MEDICATIONS  (STANDING):  acyclovir   Oral Tab/Cap 400 milliGRAM(s) Oral two times a day  aspirin enteric coated 81 milliGRAM(s) Oral daily  atorvastatin 40 milliGRAM(s) Oral at bedtime  calcium carbonate    500 mG (Tums) Chewable 2 Tablet(s) Chew at bedtime  ferrous    sulfate 325 milliGRAM(s) Oral daily  furosemide   Injectable 20 milliGRAM(s) IV Push two times a day  levothyroxine 100 MICROGram(s) Oral daily  sacubitril 24 mG/valsartan 26 mG 1 Tablet(s) Oral two times a day    MEDICATIONS  (PRN):  acetaminophen     Tablet .. 650 milliGRAM(s) Oral every 6 hours PRN Temp greater or equal to 38C (100.4F), Mild Pain (1 - 3)  ALBUTerol    90 MICROgram(s) HFA Inhaler 2 Puff(s) Inhalation every 6 hours PRN Shortness of Breath and/or Wheezing  aluminum hydroxide/magnesium hydroxide/simethicone Suspension 30 milliLiter(s) Oral every 4 hours PRN Dyspepsia  ondansetron Injectable 4 milliGRAM(s) IV Push every 8 hours PRN Nausea and/or Vomiting      Allergies    No Known Allergies    Intolerances        LABS:                        10.2   9.09  )-----------( 195      ( 17 Aug 2022 05:59 )             30.9     08-17    146<H>  |  113<H>  |  43<H>  ----------------------------<  120<H>  3.9   |  27  |  0.85    Ca    9.2      17 Aug 2022 05:59  Phos  2.6     08-16  Mg     2.4     08-16      PTT - ( 15 Aug 2022 08:21 )  PTT:64.9 sec      RADIOLOGY & ADDITIONAL TESTS:  Studies reviewed.   INTERVAL HPI/OVERNIGHT EVENTS:  Patient S&E at bedside. BiV ppm placed yesterday without complications.     PAST MEDICAL & SURGICAL HISTORY:  Hypertension      Waldenstrom&#x27;s disease      HLD (hyperlipidemia)      CAD (coronary artery disease)      Hypothyroidism      S/P hysterectomy          FAMILY HISTORY:  No pertinent family history in first degree relatives        VITAL SIGNS:  T(F): 99.7 (08-16-22 @ 23:00)  HR: 64 (08-17-22 @ 05:00)  BP: 127/66 (08-17-22 @ 02:00)  RR: 17 (08-17-22 @ 05:00)  SpO2: 96% (08-17-22 @ 05:00)  Wt(kg): --    PHYSICAL EXAM:    Constitutional: NAD  Eyes: EOMI, sclera non-icteric  Neck: supple, no masses, no JVD  Respiratory: CTA b/l, good air entry b/l  Cardiovascular: bradycardiac, systolic murmur PPM site healing  Gastrointestinal: soft, NTND, no masses palpable, + BS, no hepatosplenomegaly  Extremities: no c/c/e  Neurological: AAOx3      MEDICATIONS  (STANDING):  acyclovir   Oral Tab/Cap 400 milliGRAM(s) Oral two times a day  aspirin enteric coated 81 milliGRAM(s) Oral daily  atorvastatin 40 milliGRAM(s) Oral at bedtime  calcium carbonate    500 mG (Tums) Chewable 2 Tablet(s) Chew at bedtime  ferrous    sulfate 325 milliGRAM(s) Oral daily  furosemide   Injectable 20 milliGRAM(s) IV Push two times a day  levothyroxine 100 MICROGram(s) Oral daily  sacubitril 24 mG/valsartan 26 mG 1 Tablet(s) Oral two times a day    MEDICATIONS  (PRN):  acetaminophen     Tablet .. 650 milliGRAM(s) Oral every 6 hours PRN Temp greater or equal to 38C (100.4F), Mild Pain (1 - 3)  ALBUTerol    90 MICROgram(s) HFA Inhaler 2 Puff(s) Inhalation every 6 hours PRN Shortness of Breath and/or Wheezing  aluminum hydroxide/magnesium hydroxide/simethicone Suspension 30 milliLiter(s) Oral every 4 hours PRN Dyspepsia  ondansetron Injectable 4 milliGRAM(s) IV Push every 8 hours PRN Nausea and/or Vomiting      Allergies    No Known Allergies    Intolerances        LABS:                        10.2   9.09  )-----------( 195      ( 17 Aug 2022 05:59 )             30.9     08-17    146<H>  |  113<H>  |  43<H>  ----------------------------<  120<H>  3.9   |  27  |  0.85    Ca    9.2      17 Aug 2022 05:59  Phos  2.6     08-16  Mg     2.4     08-16      PTT - ( 15 Aug 2022 08:21 )  PTT:64.9 sec      RADIOLOGY & ADDITIONAL TESTS:  Studies reviewed.

## 2022-08-17 NOTE — CDI QUERY NOTE - NSCDIOTHERTXTBX_GEN_ALL_CORE_HH
Clinical documentation indicates that this patient has CHF.      Cardiac consult 8/15: "She was found to be in acute hypoxemic resp failure 2/2 pulm edema/CHF "  "acute hypoxemic resp failure 2/2 acute CHF exacerbation from NSTEMI,"    H+P reveals "  pt and daughter denied hx of CHF "    TTE= "  Estimated left ventricular ejection fraction is about 40 %."  " Pleural effusion - is present."    Medications Cardiovascular (MAR):   furosemide   Injectable 20 milliGRAM(s) IV Push two times a day    Please clarify the Type of Acute CHF exacerbation  a) Systolic (HFrEF)  b) Diastolic (HFpEF)  c) Combined Systolic (HFrEF)  & Diastolic (HFpEF)  d) Other (specify)  e) No clinical evidence of CHF, patient with acute pulmonary edema alone    and please clarify the acuity   a) Acute CHF, now resolved ( received IV lasix x 1 )  b) Acute on chronic CHF  c) Chronic CHF  d) Other, please clarify

## 2022-08-17 NOTE — PROGRESS NOTE ADULT - SUBJECTIVE AND OBJECTIVE BOX
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 She currently denies every having SOB but it was reported to be her main complaint when she came in. She reports now that she had weird sensations in her arms and just did not feel well iwth nausea but no vomiting.    She was found to be in acute hypoxemic resp failure 2/2 pulm edema/CHF and elevated troponins, placed on BiPAP.    : Hypoxemia improved, now on NC and saturating well. No longer has that feeling of unwell or arm symptoms. She denies SOB. Her troponin has increased significant and now she is in complete heart block - asymptomatic.  She denies any fevers, chills, CP, palp, abd pain, vomiting, dizziness, syncope, orthopnea, PND.  8/15: Patient had bradycardia to the 30s O/N. Rhythm is second degree type 2 AV block with episodes of CHB - asymptomatic. She feels well without any current complaints. Still requiring O2     SR on monitor , no acute issues     PAST MEDICAL HX  CAD (coronary artery disease)   HLD (hyperlipidemia)   HTN hypertension   Hypothyroidism   Waldenstrom's disease.     PAST SURGICAL HX  Upper endoscopy for heartburn + H pylori years ago that was treated  Colonoscopy  S/P hysterectomy.     FAMILY HX  No pertinent family history in first degree relatives. HTN, DM II    SOCIAL HX  , lives w   never smoker  no alcohol  usually independent w some assistance by daughter    Allergies  No Known Allergies    REVIEW OF SYSTEMS:  10 system ROS was obtained, all pertinent positives and negatives are in HPI otherwise they were negative.    Daily Weight in k (15 Aug 2022 06:00)      Vital Signs Last 24 Hrs  T(C): 37.6 (16 Aug 2022 23:00), Max: 37.6 (16 Aug 2022 23:00)  T(F): 99.7 (16 Aug 2022 23:00), Max: 99.7 (16 Aug 2022 23:00)  HR: 64 (17 Aug 2022 05:00) (40 - 121)  BP: 127/66 (17 Aug 2022 02:00) (104/89 - 159/67)  BP(mean): 78 (17 Aug 2022 02:00) (54 - 96)  RR: 17 (17 Aug 2022 05:00) (14 - 29)  SpO2: 96% (17 Aug 2022 05:00) (89% - 99%)    Parameters below as of 17 Aug 2022 04:00  Patient On (Oxygen Delivery Method): nasal cannula  O2 Flow (L/min): 3            PHYSICAL EXAM:   Constitutional: awake and alert in NAD  HEENT: EOMI, Normal Hearing, MMM  Neck: Soft and supple, No LAD, + JVD, no carotid bruit  Respiratory: CTA B/L with decreased breath sounds at bases  Cardiovascular: bradycardiac, Soft systolic ejection murmur at RUSB with III/VI systolic murmur at LSB, PPM site healing  Gastrointestinal: Bowel Sounds present, soft, nontender, nondistended, no guarding, no rebound  Extremities: Warm and well perfused, no peripheral edema  Neurological: A/O x 3, no focal deficits appreciated  Skin: No rashes appreciated    MEDICATIONS  (STANDING):  acyclovir   Oral Tab/Cap 400 milliGRAM(s) Oral two times a day  aspirin enteric coated 81 milliGRAM(s) Oral daily  atorvastatin 40 milliGRAM(s) Oral at bedtime  calcium carbonate    500 mG (Tums) Chewable 2 Tablet(s) Chew at bedtime  ferrous    sulfate 325 milliGRAM(s) Oral daily  furosemide   Injectable 20 milliGRAM(s) IV Push two times a day  levothyroxine 100 MICROGram(s) Oral daily  sacubitril 24 mG/valsartan 26 mG 1 Tablet(s) Oral two times a day    MEDICATIONS  (PRN):  acetaminophen     Tablet .. 650 milliGRAM(s) Oral every 6 hours PRN Temp greater or equal to 38C (100.4F), Mild Pain (1 - 3)  ALBUTerol    90 MICROgram(s) HFA Inhaler 2 Puff(s) Inhalation every 6 hours PRN Shortness of Breath and/or Wheezing  aluminum hydroxide/magnesium hydroxide/simethicone Suspension 30 milliLiter(s) Oral every 4 hours PRN Dyspepsia  ondansetron Injectable 4 milliGRAM(s) IV Push every 8 hours PRN Nausea and/or Vomiting                          9.8    8.62  )-----------( 200      ( 16 Aug 2022 06:37 )             29.9     08-16    145  |  114<H>  |  55<H>  ----------------------------<  117<H>  3.8   |  26  |  1.00    Ca    9.4      16 Aug 2022 06:37  Phos  2.6     -  Mg     2.4             RADIOLOGY:    Reviewed in EMR    EK/14/22, my interpretation: CHB at 43bpm    TELEMETRY:    Reviewed, varying between second degree type two AV block and complete heart block    ECHO:    ACC: [id number] EXAM:  ECHO TTE WO CON COMP W DOPP                        PROCEDURE DATE:  08/15/2022      M-Mode Measurements (cm)     LVEDd: 4.76 cm            LVESd: 3.93 cm   IVSEd: 0.94 cm  LVPWd: 0.94 cm            AO Root Dimension: 2.9 cm                             ACS: 1.5 cm                             LA: 3.8 cm                             LVOT: 1.7 cm    Doppler Measurements:     AV Velocity:281 cm/s                 MV Peak E-Wave: 147 cm/s   AV Peak Gradient: 31.58 mmHg         MV Peak A-Wave: 133 cm/s   AV Mean Gradient: 15 mmHg            MV E/A Ratio: 1.11 %   AV Area (Continuity):1.09 cm^2       MV Peak Gradient: 8.64 mmHg   TR Velocity:406 cm/s   TR Gradient:65.9344 mmHg   Estimated RAP:15 mmHg   RVSP:81 mmHg     Impression     The mitral valve leaflets appear thickened.   Moderate (2+) mitral regurgitation is present.   The aortic valve appears mildly calcified. Valve opening seems to be restricted.   There are fibrocalcific changes noted to the aortic valve leaflets with restriction in leaflet excursion. Transaortic gradients are    underestimated due to impaired left ventricle systolic function. Mild to moderate aortic stenosis is present.   Mild aortic insufficiency noted.   The tricuspid valve leaflets appear mildly thickened and/or calcified, but open well. moderate to severe (3+) eccentric tricuspid valve       regurgitation is present.   Severe pulmonary hypertension.   Normal appearing pulmonic valve structure.   Mild pulmonic valvular regurgitation (1+) is present.   The left atrium is mildly dilated.   Severe hypokinesis of the anteroseptal and inferoseptal walls.   Hypokinesis of the anterior wall is noted.   The apex is severely hypokinetic with sparing of the inferior wall.   Estimated left ventricular ejection fraction is about 40 %.   The right atrium appears mildly dilated.   The right ventricle is mildly dilated.   IVC is dilated and not collapsing with inspiration.   Pleural effusion -is present.

## 2022-08-17 NOTE — PROGRESS NOTE ADULT - SUBJECTIVE AND OBJECTIVE BOX
Patient is a 87y old  Female who presents with  Acute hypoxic resp failure and Acute pulmonary edema (16 Aug 2022 07:34)  BRIEF HOSPITAL COURSE: 87F with Pmhx of Waldenstrom's Dz, CHF, admitted with NSTEMI. underwent cath showing non-obs CAD. However the patient had episodes of CHB and had a TVP placed. She is now S/P PPM placement(BiVICD).    8/17 -     PHYSICAL EXAM:  Vital Signs Last 24 Hrs  T(C): 37.2 (17 Aug 2022 08:00), Max: 37.6 (16 Aug 2022 23:00)  T(F): 98.9 (17 Aug 2022 08:00), Max: 99.7 (16 Aug 2022 23:00)  HR: 91 (17 Aug 2022 13:00) (64 - 91)  BP: 113/59 (17 Aug 2022 13:00) (96/67 - 146/68)  RR: 14 (17 Aug 2022 13:00) (14 - 22)  SpO2: 97% (17 Aug 2022 13:00) (91% - 98%) nasal cannula O2 Flow (L/min): 3  GENERAL: NAD, able to lie flat in bed  HEAD:  Atraumatic, Normocephalic  EYES: EOMI, PERRLA, normal sclera  ENT: Moist mucous membranes  NECK: Supple, No JVD, no nuchal rigidity  CHEST/LUNG: Clear to auscultation bilaterally; No rales, rhonchi, wheezing, or rubs. Unlabored respirations  HEART: Regular rate and rhythm; No murmurs, rubs, or gallops  ABDOMEN: Bowel sounds present; Soft, Nontender, Nondistended. No hepatomegaly  EXTREMITIES:  no pitting bilaterally  NERVOUS SYSTEM:  Alert & Oriented X3, speech clear. No focal motor or sensory deficits  MSK: FROM all 4 extremities, full and equal strength  SKIN: No rashes or lesions      LABS: All Labs Reviewed:                      10.2   9.09  )-----------( 195      ( 17 Aug 2022 05:59 )             30.9  146<H>  |  113<H>  |  43<H>  ----------------------------<  120<H>  3.9   |  27  |  0.85    < from: CT Angio Chest PE Protocol w/ IV Cont (08.13.22 @ 23:53) >  No CT evidence of pulmonary embolism.  Findings suggest pulmonary edema with small bilateral pleural effusions;   correlate clinically for superimposed infection.  Ill-defined subcarinal adenopathy probably related to history of lymphoma.    < from: TTE Echo Complete w/o Contrast w/ Doppler (08.15.22 @ 08:12) >   Moderate (2+) mitral regurgitation is present.   The aortic valve appears mildly calcified. Valve opening seems to be   restricted. Mild to moderate aortic stenosis is present.   Moderate to severe (3+) eccentric tricuspid valve regurgitation is   present.   Severe pulmonary hypertension.   Severe hypokinesis of the anteroseptal and inferoseptal walls.   Hypokinesis of the anterior wall is noted.   The apex is severely hypokinetic with sparing of the inferior wall.   Estimated left ventricular ejection fraction is about 40 %.   The right atrium appears mildly dilated.   The right ventricle is mildly dilated.   IVC is dilated and not collapsing with inspiration.   Pleural effusion -is present.    MEDS:   acetaminophen     Tablet .. 650 milliGRAM(s) Oral every 6 hours PRN  acyclovir   Oral Tab/Cap 400 milliGRAM(s) Oral two times a day  ALBUTerol    90 MICROgram(s) HFA Inhaler 2 Puff(s) Inhalation every 6 hours PRN  aluminum hydroxide/magnesium hydroxide/simethicone Suspension 30 milliLiter(s) Oral every 4 hours PRN  aspirin enteric coated 81 milliGRAM(s) Oral daily  atorvastatin 40 milliGRAM(s) Oral at bedtime  calcium carbonate    500 mG (Tums) Chewable 2 Tablet(s) Chew at bedtime  ferrous    sulfate 325 milliGRAM(s) Oral daily  furosemide   Injectable 20 milliGRAM(s) IV Push two times a day  levothyroxine 100 MICROGram(s) Oral daily  ondansetron Injectable 4 milliGRAM(s) IV Push every 8 hours PRN  sacubitril 24 mG/valsartan 26 mG 1 Tablet(s) Oral two times a day                                             Patient is a 87y old  Female who presents with  Acute hypoxic resp failure and Acute pulmonary edema (16 Aug 2022 07:34)  BRIEF HOSPITAL COURSE: 87F with Pmhx of Waldenstrom's Dz, CHF, admitted with NSTEMI. underwent cath showing non-obs CAD. However the patient had episodes of CHB and had a TVP placed. She is now S/P PPM placement(BiVICD).    8/17 - feelign better, no cp palps sob at rest - soreness at surgical site     PHYSICAL EXAM:  Vital Signs Last 24 Hrs  T(C): 37.2 (17 Aug 2022 08:00), Max: 37.6 (16 Aug 2022 23:00)  T(F): 98.9 (17 Aug 2022 08:00), Max: 99.7 (16 Aug 2022 23:00)  HR: 91 (17 Aug 2022 13:00) (64 - 91)  BP: 113/59 (17 Aug 2022 13:00) (96/67 - 146/68)  RR: 14 (17 Aug 2022 13:00) (14 - 22)  SpO2: 97% (17 Aug 2022 13:00) (91% - 98%) nasal cannula O2 Flow (L/min): 3  GENERAL: NAD, able to lie flat in bed; chest wall - post-surgical  HEAD:  Atraumatic, Normocephalic  EYES: EOMI, PERRLA, normal sclera  ENT: Moist mucous membranes  NECK: Supple, No JVD, no nuchal rigidity  CHEST/LUNG: Clear to auscultation bilaterally; No rales, rhonchi, wheezing, or rubs. Unlabored respirations  HEART: Regular rate and rhythm; No murmurs, rubs, or gallops  ABDOMEN: Bowel sounds present; Soft, Nontender, Nondistended. No hepatomegaly  EXTREMITIES:  no pitting bilaterally  NERVOUS SYSTEM:  Alert & Oriented X3, speech clear. No focal motor or sensory deficits  MSK: FROM all 4 extremities, full and equal strength  SKIN: No rashes or lesions      LABS: All Labs Reviewed:                      10.2   9.09  )-----------( 195      ( 17 Aug 2022 05:59 )             30.9  146<H>  |  113<H>  |  43<H>  ----------------------------<  120<H>  3.9   |  27  |  0.85    < from: CT Angio Chest PE Protocol w/ IV Cont (08.13.22 @ 23:53) >  No CT evidence of pulmonary embolism.  Findings suggest pulmonary edema with small bilateral pleural effusions;   correlate clinically for superimposed infection.  Ill-defined subcarinal adenopathy probably related to history of lymphoma.    < from: TTE Echo Complete w/o Contrast w/ Doppler (08.15.22 @ 08:12) >   Moderate (2+) mitral regurgitation is present.   The aortic valve appears mildly calcified. Valve opening seems to be   restricted. Mild to moderate aortic stenosis is present.   Moderate to severe (3+) eccentric tricuspid valve regurgitation is   present.   Severe pulmonary hypertension.   Severe hypokinesis of the anteroseptal and inferoseptal walls.   Hypokinesis of the anterior wall is noted.   The apex is severely hypokinetic with sparing of the inferior wall.   Estimated left ventricular ejection fraction is about 40 %.   The right atrium appears mildly dilated.   The right ventricle is mildly dilated.   IVC is dilated and not collapsing with inspiration.   Pleural effusion -is present.    MEDS:   acetaminophen     Tablet .. 650 milliGRAM(s) Oral every 6 hours PRN  acyclovir   Oral Tab/Cap 400 milliGRAM(s) Oral two times a day  ALBUTerol    90 MICROgram(s) HFA Inhaler 2 Puff(s) Inhalation every 6 hours PRN  aluminum hydroxide/magnesium hydroxide/simethicone Suspension 30 milliLiter(s) Oral every 4 hours PRN  aspirin enteric coated 81 milliGRAM(s) Oral daily  atorvastatin 40 milliGRAM(s) Oral at bedtime  calcium carbonate    500 mG (Tums) Chewable 2 Tablet(s) Chew at bedtime  ferrous    sulfate 325 milliGRAM(s) Oral daily  furosemide   Injectable 20 milliGRAM(s) IV Push two times a day  levothyroxine 100 MICROGram(s) Oral daily  ondansetron Injectable 4 milliGRAM(s) IV Push every 8 hours PRN  sacubitril 24 mG/valsartan 26 mG 1 Tablet(s) Oral two times a day

## 2022-08-17 NOTE — PROGRESS NOTE ADULT - NS ATTEND AMEND GEN_ALL_CORE FT
pt is s/p ppm CRTP for CHB, normal device function, today repogrammed, cont chf medical therapy as tolerated  remote monitoring  follow w/ device clinic
elderly pt with CHB, non obstructive CAD on LHC, non ischemic CMP LVEF 40%, will benefit from CRTP, procedure scheduled tomorrow

## 2022-08-18 LAB
ANION GAP SERPL CALC-SCNC: 4 MMOL/L — LOW (ref 5–17)
BUN SERPL-MCNC: 45 MG/DL — HIGH (ref 7–23)
CALCIUM SERPL-MCNC: 8.8 MG/DL — SIGNIFICANT CHANGE UP (ref 8.5–10.1)
CHLORIDE SERPL-SCNC: 112 MMOL/L — HIGH (ref 96–108)
CO2 SERPL-SCNC: 29 MMOL/L — SIGNIFICANT CHANGE UP (ref 22–31)
CREAT SERPL-MCNC: 0.86 MG/DL — SIGNIFICANT CHANGE UP (ref 0.5–1.3)
EGFR: 65 ML/MIN/1.73M2 — SIGNIFICANT CHANGE UP
GLUCOSE SERPL-MCNC: 128 MG/DL — HIGH (ref 70–99)
HCT VFR BLD CALC: 31.3 % — LOW (ref 34.5–45)
HGB BLD-MCNC: 10.3 G/DL — LOW (ref 11.5–15.5)
MAGNESIUM SERPL-MCNC: 1.9 MG/DL — SIGNIFICANT CHANGE UP (ref 1.6–2.6)
MCHC RBC-ENTMCNC: 32.9 GM/DL — SIGNIFICANT CHANGE UP (ref 32–36)
MCHC RBC-ENTMCNC: 34.3 PG — HIGH (ref 27–34)
MCV RBC AUTO: 104.3 FL — HIGH (ref 80–100)
PLATELET # BLD AUTO: 189 K/UL — SIGNIFICANT CHANGE UP (ref 150–400)
POTASSIUM SERPL-MCNC: 3.3 MMOL/L — LOW (ref 3.5–5.3)
POTASSIUM SERPL-SCNC: 3.3 MMOL/L — LOW (ref 3.5–5.3)
RBC # BLD: 3 M/UL — LOW (ref 3.8–5.2)
RBC # FLD: 15.3 % — HIGH (ref 10.3–14.5)
SODIUM SERPL-SCNC: 145 MMOL/L — SIGNIFICANT CHANGE UP (ref 135–145)
WBC # BLD: 7.86 K/UL — SIGNIFICANT CHANGE UP (ref 3.8–10.5)
WBC # FLD AUTO: 7.86 K/UL — SIGNIFICANT CHANGE UP (ref 3.8–10.5)

## 2022-08-18 PROCEDURE — 99232 SBSQ HOSP IP/OBS MODERATE 35: CPT

## 2022-08-18 RX ORDER — POTASSIUM CHLORIDE 20 MEQ
40 PACKET (EA) ORAL EVERY 4 HOURS
Refills: 0 | Status: COMPLETED | OUTPATIENT
Start: 2022-08-18 | End: 2022-08-18

## 2022-08-18 RX ORDER — FUROSEMIDE 40 MG
20 TABLET ORAL DAILY
Refills: 0 | Status: DISCONTINUED | OUTPATIENT
Start: 2022-08-18 | End: 2022-08-19

## 2022-08-18 RX ADMIN — ATORVASTATIN CALCIUM 40 MILLIGRAM(S): 80 TABLET, FILM COATED ORAL at 23:03

## 2022-08-18 RX ADMIN — Medication 81 MILLIGRAM(S): at 10:12

## 2022-08-18 RX ADMIN — Medication 400 MILLIGRAM(S): at 10:13

## 2022-08-18 RX ADMIN — CARVEDILOL PHOSPHATE 3.12 MILLIGRAM(S): 80 CAPSULE, EXTENDED RELEASE ORAL at 23:04

## 2022-08-18 RX ADMIN — Medication 325 MILLIGRAM(S): at 10:13

## 2022-08-18 RX ADMIN — CARVEDILOL PHOSPHATE 3.12 MILLIGRAM(S): 80 CAPSULE, EXTENDED RELEASE ORAL at 10:13

## 2022-08-18 RX ADMIN — Medication 40 MILLIEQUIVALENT(S): at 18:17

## 2022-08-18 RX ADMIN — SACUBITRIL AND VALSARTAN 1 TABLET(S): 24; 26 TABLET, FILM COATED ORAL at 10:12

## 2022-08-18 RX ADMIN — Medication 2 TABLET(S): at 23:03

## 2022-08-18 RX ADMIN — Medication 100 MICROGRAM(S): at 06:10

## 2022-08-18 RX ADMIN — Medication 400 MILLIGRAM(S): at 23:03

## 2022-08-18 RX ADMIN — SACUBITRIL AND VALSARTAN 1 TABLET(S): 24; 26 TABLET, FILM COATED ORAL at 23:03

## 2022-08-18 RX ADMIN — Medication 20 MILLIGRAM(S): at 06:10

## 2022-08-18 RX ADMIN — Medication 40 MILLIEQUIVALENT(S): at 23:03

## 2022-08-18 NOTE — PROGRESS NOTE ADULT - SUBJECTIVE AND OBJECTIVE BOX
Patient is a 87y old  Female who presents with  Acute hypoxic resp failure and Acute pulmonary edema (16 Aug 2022 07:34)  BRIEF HOSPITAL COURSE: 87F with Pmhx of Waldenstrom's Dz, CHF, admitted with NSTEMI. underwent cath showing non-obs CAD. However the patient had episodes of CHB and had a TVP placed. She is now S/P PPM placement(BiVICD).    8/17 - feelign better, no cp palps sob at rest - soreness at surgical site     PHYSICAL EXAM:  Vital Signs Last 24 Hrs  T(C): 37.2 (17 Aug 2022 08:00), Max: 37.6 (16 Aug 2022 23:00)  T(F): 98.9 (17 Aug 2022 08:00), Max: 99.7 (16 Aug 2022 23:00)  HR: 91 (17 Aug 2022 13:00) (64 - 91)  BP: 113/59 (17 Aug 2022 13:00) (96/67 - 146/68)  RR: 14 (17 Aug 2022 13:00) (14 - 22)  SpO2: 97% (17 Aug 2022 13:00) (91% - 98%) nasal cannula O2 Flow (L/min): 3  GENERAL: NAD, able to lie flat in bed; chest wall - post-surgical  HEAD:  Atraumatic, Normocephalic  EYES: EOMI, PERRLA, normal sclera  ENT: Moist mucous membranes  NECK: Supple, No JVD, no nuchal rigidity  CHEST/LUNG: Clear to auscultation bilaterally; No rales, rhonchi, wheezing, or rubs. Unlabored respirations  HEART: Regular rate and rhythm; No murmurs, rubs, or gallops  ABDOMEN: Bowel sounds present; Soft, Nontender, Nondistended. No hepatomegaly  EXTREMITIES:  no pitting bilaterally  NERVOUS SYSTEM:  Alert & Oriented X3, speech clear. No focal motor or sensory deficits  MSK: FROM all 4 extremities, full and equal strength  SKIN: No rashes or lesions      LABS: All Labs Reviewed:                      10.2   9.09  )-----------( 195      ( 17 Aug 2022 05:59 )             30.9  146<H>  |  113<H>  |  43<H>  ----------------------------<  120<H>  3.9   |  27  |  0.85    < from: CT Angio Chest PE Protocol w/ IV Cont (08.13.22 @ 23:53) >  No CT evidence of pulmonary embolism.  Findings suggest pulmonary edema with small bilateral pleural effusions;   correlate clinically for superimposed infection.  Ill-defined subcarinal adenopathy probably related to history of lymphoma.    < from: TTE Echo Complete w/o Contrast w/ Doppler (08.15.22 @ 08:12) >   Moderate (2+) mitral regurgitation is present.   The aortic valve appears mildly calcified. Valve opening seems to be   restricted. Mild to moderate aortic stenosis is present.   Moderate to severe (3+) eccentric tricuspid valve regurgitation is   present.   Severe pulmonary hypertension.   Severe hypokinesis of the anteroseptal and inferoseptal walls.   Hypokinesis of the anterior wall is noted.   The apex is severely hypokinetic with sparing of the inferior wall.   Estimated left ventricular ejection fraction is about 40 %.   The right atrium appears mildly dilated.   The right ventricle is mildly dilated.   IVC is dilated and not collapsing with inspiration.   Pleural effusion -is present.    MEDS:   acetaminophen     Tablet .. 650 milliGRAM(s) Oral every 6 hours PRN  acyclovir   Oral Tab/Cap 400 milliGRAM(s) Oral two times a day  ALBUTerol    90 MICROgram(s) HFA Inhaler 2 Puff(s) Inhalation every 6 hours PRN  aluminum hydroxide/magnesium hydroxide/simethicone Suspension 30 milliLiter(s) Oral every 4 hours PRN  aspirin enteric coated 81 milliGRAM(s) Oral daily  atorvastatin 40 milliGRAM(s) Oral at bedtime  calcium carbonate    500 mG (Tums) Chewable 2 Tablet(s) Chew at bedtime  ferrous    sulfate 325 milliGRAM(s) Oral daily  furosemide   Injectable 20 milliGRAM(s) IV Push two times a day  levothyroxine 100 MICROGram(s) Oral daily  ondansetron Injectable 4 milliGRAM(s) IV Push every 8 hours PRN  sacubitril 24 mG/valsartan 26 mG 1 Tablet(s) Oral two times a day                                             Patient is a 87y old  Female who presents with  Acute hypoxic resp failure and Acute pulmonary edema (16 Aug 2022 07:34)  BRIEF HOSPITAL COURSE: 87F with Pmhx of Waldenstrom's Dz, CHF, admitted with NSTEMI. underwent cath showing non-obs CAD. However the patient had episodes of CHB and had a TVP placed. She is now S/P PPM placement(BiVICD).    8/17 - feelign better, no cp palps sob at rest - soreness at surgical site   8/18 - an episode of hypotension 76/63 but pt asymptomatic. was oob today; denies cp palps sob     PHYSICAL EXAM:  Vital Signs Last 24 Hrs  T(C): 37 (18 Aug 2022 21:13), Max: 37.5 (17 Aug 2022 21:32)  T(F): 98.6 (18 Aug 2022 21:13), Max: 99.5 (17 Aug 2022 21:32)  HR: 85 (18 Aug 2022 18:00) (69 - 85)  BP: 112/60 (18 Aug 2022 18:00) (76/63 - 125/65)  BP(mean): 73 (18 Aug 2022 18:00) (42 - 78)  RR: 13 (18 Aug 2022 16:00) (12 - 18)  GENERAL: NAD, sitting u pin chair, chest wall - post-surgical  HEAD:  Atraumatic, Normocephalic  EYES: EOMI, PERRLA, normal sclera  ENT: Moist mucous membranes  NECK: Supple, No JVD, no nuchal rigidity  CHEST/LUNG: Clear to auscultation bilaterally; No rales, rhonchi, wheezing, or rubs. Unlabored respirations  HEART: Regular rate and rhythm; No murmurs, rubs, or gallops  ABDOMEN: Bowel sounds present; Soft, Nontender, Nondistended. No hepatomegaly  EXTREMITIES:  no pitting bilaterally  NERVOUS SYSTEM:  Alert & Oriented X3, speech clear. No focal motor or sensory deficits  MSK: FROM all 4 extremities, full and equal strength  SKIN: No rashes or lesions    < from: CT Angio Chest PE Protocol w/ IV Cont (08.13.22 @ 23:53) >  No CT evidence of pulmonary embolism.  Findings suggest pulmonary edema with small bilateral pleural effusions;   correlate clinically for superimposed infection.  Ill-defined subcarinal adenopathy probably related to history of lymphoma.    < from: TTE Echo Complete w/o Contrast w/ Doppler (08.15.22 @ 08:12) >   Moderate (2+) mitral regurgitation is present.   The aortic valve appears mildly calcified. Valve opening seems to be   restricted. Mild to moderate aortic stenosis is present.   Moderate to severe (3+) eccentric tricuspid valve regurgitation is   present.   Severe pulmonary hypertension.   Severe hypokinesis of the anteroseptal and inferoseptal walls.   Hypokinesis of the anterior wall is noted.   The apex is severely hypokinetic with sparing of the inferior wall.   Estimated left ventricular ejection fraction is about 40 %.   The right atrium appears mildly dilated.   The right ventricle is mildly dilated.   IVC is dilated and not collapsing with inspiration.   Pleural effusion -is present.    LABS: All Labs Reviewed:                        10.3   7.86  )-----------( 189      ( 18 Aug 2022 05:17 )             31.3   145  |  112<H>  |  45<H>  ----------------------------<  128<H>  3.3<L>   |  29  |  0.86    Ca    8.8      18 Aug 2022 05:17  Mg     1.9     08-18        acetaminophen     Tablet .. 650 milliGRAM(s) Oral every 6 hours PRN  acyclovir   Oral Tab/Cap 400 milliGRAM(s) Oral two times a day  ALBUTerol    90 MICROgram(s) HFA Inhaler 2 Puff(s) Inhalation every 6 hours PRN  aluminum hydroxide/magnesium hydroxide/simethicone Suspension 30 milliLiter(s) Oral every 4 hours PRN  aspirin enteric coated 81 milliGRAM(s) Oral daily  atorvastatin 40 milliGRAM(s) Oral at bedtime  calcium carbonate    500 mG (Tums) Chewable 2 Tablet(s) Chew at bedtime  carvedilol 3.125 milliGRAM(s) Oral every 12 hours  ferrous    sulfate 325 milliGRAM(s) Oral daily  furosemide   Injectable 20 milliGRAM(s) IV Push two times a day  levothyroxine 100 MICROGram(s) Oral daily  ondansetron Injectable 4 milliGRAM(s) IV Push every 8 hours PRN  potassium chloride   Powder 40 milliEquivalent(s) Oral every 4 hours  sacubitril 24 mG/valsartan 26 mG 1 Tablet(s) Oral two times a day

## 2022-08-18 NOTE — PROGRESS NOTE ADULT - ASSESSMENT
86 yo F with above PMHx who presents because feeling unwell and SOB found to be in acute hypoxemic resp failure 2/2 acute CHF exacerbation from NSTEMI, complete heart block/second degree type 2 AV block. s/p BiP    -Echo showing severe pulm HTN with dilated and noncollapsing IVC with pleural effusion - likely from her dCHF exacerbation from her ischemia  Continue lasix IV BID and entresto- if chem anbd BNP stable/ improving-- recommend switching to lasix 40 PO daily   s/p BIVP--- on Coreg with pacing and HR ~70    -C/w ASA,  Statin    follow up AM labs for BNP, potassium levels    recommend PT for discharge planning    recommend outpatient follow up echo to re-evaluate LV function and pulm pressure once stabilized

## 2022-08-18 NOTE — PROGRESS NOTE ADULT - SUBJECTIVE AND OBJECTIVE BOX
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 She currently denies every having SOB but it was reported to be her main complaint when she came in. She reports now that she had weird sensations in her arms and just did not feel well iwth nausea but no vomiting.    She was found to be in acute hypoxemic resp failure 2/2 pulm edema/CHF and elevated troponins, placed on BiPAP.    : Hypoxemia improved, now on NC and saturating well. No longer has that feeling of unwell or arm symptoms. She denies SOB. Her troponin has increased significant and now she is in complete heart block - asymptomatic.  She denies any fevers, chills, CP, palp, abd pain, vomiting, dizziness, syncope, orthopnea, PND.  8/15: Patient had bradycardia to the 30s O/N. Rhythm is second degree type 2 AV block with episodes of CHB - asymptomatic. She feels well without any current complaints. Still requiring O2      no acute issues overnight     PAST MEDICAL HX  CAD (coronary artery disease)   HLD (hyperlipidemia)   HTN hypertension   Hypothyroidism   Waldenstrom's disease.     PAST SURGICAL HX  Upper endoscopy for heartburn + H pylori years ago that was treated  Colonoscopy  S/P hysterectomy.     FAMILY HX  No pertinent family history in first degree relatives. HTN, DM II    SOCIAL HX  , lives w   never smoker  no alcohol  usually independent w some assistance by daughter    Allergies  No Known Allergies    REVIEW OF SYSTEMS:  10 system ROS was obtained, all pertinent positives and negatives are in HPI otherwise they were negative.    Daily Weight in k (15 Aug 2022 06:00)      Vital Signs Last 24 Hrs  T(C): 37 (18 Aug 2022 00:00), Max: 37.5 (17 Aug 2022 21:32)  T(F): 98.6 (18 Aug 2022 00:00), Max: 99.5 (17 Aug 2022 21:32)  HR: 70 (18 Aug 2022 02:00) (68 - 94)  BP: 95/52 (18 Aug 2022 02:00) (91/54 - 143/87)  BP(mean): 62 (18 Aug 2022 02:00) (62 - 99)  RR: 17 (18 Aug 2022 02:00) (13 - 25)  SpO2: 97% (17 Aug 2022 19:00) (95% - 97%)    Parameters below as of 17 Aug 2022 19:00  Patient On (Oxygen Delivery Method): room air            PHYSICAL EXAM:   Constitutional: awake and alert in NAD  HEENT: EOMI, Normal Hearing, MMM  Neck: Soft and supple, No LAD, + JVD, no carotid bruit  Respiratory: CTA B/L with decreased breath sounds at bases  Cardiovascular: bradycardiac, Soft systolic ejection murmur at RUSB with III/VI systolic murmur at LSB, PPM site healing  Gastrointestinal: Bowel Sounds present, soft, nontender, nondistended, no guarding, no rebound  Extremities: Warm and well perfused, no peripheral edema  Neurological: A/O x 3, no focal deficits appreciated  Skin: No rashes appreciated        MEDICATIONS  (STANDING):  acyclovir   Oral Tab/Cap 400 milliGRAM(s) Oral two times a day  aspirin enteric coated 81 milliGRAM(s) Oral daily  atorvastatin 40 milliGRAM(s) Oral at bedtime  calcium carbonate    500 mG (Tums) Chewable 2 Tablet(s) Chew at bedtime  carvedilol 3.125 milliGRAM(s) Oral every 12 hours  ferrous    sulfate 325 milliGRAM(s) Oral daily  furosemide   Injectable 20 milliGRAM(s) IV Push two times a day  levothyroxine 100 MICROGram(s) Oral daily  sacubitril 24 mG/valsartan 26 mG 1 Tablet(s) Oral two times a day    MEDICATIONS  (PRN):  acetaminophen     Tablet .. 650 milliGRAM(s) Oral every 6 hours PRN Temp greater or equal to 38C (100.4F), Mild Pain (1 - 3)  ALBUTerol    90 MICROgram(s) HFA Inhaler 2 Puff(s) Inhalation every 6 hours PRN Shortness of Breath and/or Wheezing  aluminum hydroxide/magnesium hydroxide/simethicone Suspension 30 milliLiter(s) Oral every 4 hours PRN Dyspepsia  ondansetron Injectable 4 milliGRAM(s) IV Push every 8 hours PRN Nausea and/or Vomiting        08-18    145  |  112<H>  |  45<H>  ----------------------------<  128<H>  3.3<L>   |  29  |  0.86    Ca    8.8      18 Aug 2022 05:17  Phos  2.6     08-16  Mg     1.9     08-18                          10.3   7.86  )-----------( 189      ( 18 Aug 2022 05:17 )             31.3           RADIOLOGY:    Reviewed in EMR    EK/14/22, my interpretation: CHB at 43bpm    TELEMETRY:    Reviewed, varying between second degree type two AV block and complete heart block    ECHO:    ACC: [id number] EXAM:  ECHO TTE WO CON COMP W DOPP                        PROCEDURE DATE:  08/15/2022      M-Mode Measurements (cm)     LVEDd: 4.76 cm            LVESd: 3.93 cm   IVSEd: 0.94 cm  LVPWd: 0.94 cm            AO Root Dimension: 2.9 cm                             ACS: 1.5 cm                             LA: 3.8 cm                             LVOT: 1.7 cm    Doppler Measurements:     AV Velocity:281 cm/s                 MV Peak E-Wave: 147 cm/s   AV Peak Gradient: 31.58 mmHg         MV Peak A-Wave: 133 cm/s   AV Mean Gradient: 15 mmHg            MV E/A Ratio: 1.11 %   AV Area (Continuity):1.09 cm^2       MV Peak Gradient: 8.64 mmHg   TR Velocity:406 cm/s   TR Gradient:65.9344 mmHg   Estimated RAP:15 mmHg   RVSP:81 mmHg     Impression     The mitral valve leaflets appear thickened.   Moderate (2+) mitral regurgitation is present.   The aortic valve appears mildly calcified. Valve opening seems to be restricted.   There are fibrocalcific changes noted to the aortic valve leaflets with restriction in leaflet excursion. Transaortic gradients are    underestimated due to impaired left ventricle systolic function. Mild to moderate aortic stenosis is present.   Mild aortic insufficiency noted.   The tricuspid valve leaflets appear mildly thickened and/or calcified, but open well. moderate to severe (3+) eccentric tricuspid valve       regurgitation is present.   Severe pulmonary hypertension.   Normal appearing pulmonic valve structure.   Mild pulmonic valvular regurgitation (1+) is present.   The left atrium is mildly dilated.   Severe hypokinesis of the anteroseptal and inferoseptal walls.   Hypokinesis of the anterior wall is noted.   The apex is severely hypokinetic with sparing of the inferior wall.   Estimated left ventricular ejection fraction is about 40 %.   The right atrium appears mildly dilated.   The right ventricle is mildly dilated.   IVC is dilated and not collapsing with inspiration.   Pleural effusion -is present.

## 2022-08-18 NOTE — PROGRESS NOTE ADULT - ASSESSMENT
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 [facility], HLD, HTN, hypothyroid came to ED by family c/o difficulty breathing and pain to arms and back now with concern for CHb with nonobstructive cad, now s/p BiV PPM.     # Waldenstroms Macroglobulinemia   - Waldenstrom macroglobulinemia (bone marrow biopsy 12/15/2020 showing extensive low-grade B-cell lymphoproliferative infiltrate; IgM 5g), treated in ambulatory with weekly dexamethasone/bortezomib at [facility] and then [place] [facility]  - pt has been on calquence with very good response, IgM 260 in 7/2022 (was over 5000 at diagnosis)  - Calquence known to cause hemorrhage as well as afib/aflutter  - will continue to HOLD Acalabrutinib - discussed with [facility] team [place]. 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 Wale Beck  cell: [phone]  Weekends and nights please call [phone] for MD on call  [facility]  Hematology/Oncology  87 year old female w hx Waldenstrom disease on calquence [place] [facility] [place], HLD, HTN, hypothyroid came to ED by family c/o difficulty breathing and pain to arms and back now with concern for CHb with nonobstructive cad, now s/p BiV PPM.     # Waldenstroms Macroglobulinemia   - Waldenstrom macroglobulinemia (bone marrow biopsy 12/15/2020 showing extensive low-grade B-cell lymphoproliferative infiltrate; IgM 5g), treated in ambulatory with weekly dexamethasone/[facility]ortezomib at [facility] and then [place] [facility]  - pt has been on calquence with very good response, IgM 260 in 7/2022 (was over 5000 at diagnosis)  - Calquence known to cause hemorrhage as well as afib/aflutter  - will continue to HOLD Acalabrutinib - discussed with [facility] [place] 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 Wale Beck  cell: [phone]  Weekends and nights please call [phone] for MD on call  [facility]  Hematology/Oncology

## 2022-08-18 NOTE — PROGRESS NOTE ADULT - SUBJECTIVE AND OBJECTIVE BOX
INTERVAL HPI/OVERNIGHT EVENTS:  Patient S&E at bedside. No o/n events, pt eing downgraded to tele today     PAST MEDICAL & SURGICAL HISTORY:  Hypertension      Waldenstrom&#x27;s disease      HLD (hyperlipidemia)      CAD (coronary artery disease)      Hypothyroidism      S/P hysterectomy          FAMILY HISTORY:  No pertinent family history in first degree relatives        VITAL SIGNS:  T(F): 98.6 (08-18-22 @ 00:00)  HR: 70 (08-18-22 @ 07:00)  BP: 113/60 (08-18-22 @ 06:00)  RR: 12 (08-18-22 @ 07:00)  SpO2: 97% (08-17-22 @ 19:00)  Wt(kg): --    PHYSICAL EXAM:    Constitutional: NAD  Eyes: EOMI, sclera non-icteric  Respiratory: CTA b/l,   Cardiovascular: systolic murmur PPM site healing  Gastrointestinal: soft, NTND,   Extremities: no c/c/e  Neurological: AAOx3    MEDICATIONS  (STANDING):  acyclovir   Oral Tab/Cap 400 milliGRAM(s) Oral two times a day  aspirin enteric coated 81 milliGRAM(s) Oral daily  atorvastatin 40 milliGRAM(s) Oral at bedtime  calcium carbonate    500 mG (Tums) Chewable 2 Tablet(s) Chew at bedtime  carvedilol 3.125 milliGRAM(s) Oral every 12 hours  ferrous    sulfate 325 milliGRAM(s) Oral daily  furosemide   Injectable 20 milliGRAM(s) IV Push two times a day  levothyroxine 100 MICROGram(s) Oral daily  sacubitril 24 mG/valsartan 26 mG 1 Tablet(s) Oral two times a day    MEDICATIONS  (PRN):  acetaminophen     Tablet .. 650 milliGRAM(s) Oral every 6 hours PRN Temp greater or equal to 38C (100.4F), Mild Pain (1 - 3)  ALBUTerol    90 MICROgram(s) HFA Inhaler 2 Puff(s) Inhalation every 6 hours PRN Shortness of Breath and/or Wheezing  aluminum hydroxide/magnesium hydroxide/simethicone Suspension 30 milliLiter(s) Oral every 4 hours PRN Dyspepsia  ondansetron Injectable 4 milliGRAM(s) IV Push every 8 hours PRN Nausea and/or Vomiting      Allergies    No Known Allergies    Intolerances        LABS:                        10.3   7.86  )-----------( 189      ( 18 Aug 2022 05:17 )             31.3     08-18    145  |  112<H>  |  45<H>  ----------------------------<  128<H>  3.3<L>   |  29  |  0.86    Ca    8.8      18 Aug 2022 05:17  Mg     1.9     08-18            RADIOLOGY & ADDITIONAL TESTS:  Studies reviewed.   INTERVAL HPI/OVERNIGHT EVENTS:  Patient S&E at bedside. No o/n events, pt eing downgraded to tele today   mild pain and swelling at site of ppm   Pt reports feeling better, encouraged to get OOBTC today  up for transfer to floor     PAST MEDICAL & SURGICAL HISTORY:  Hypertension      Waldenstrom&#x27;s disease      HLD (hyperlipidemia)      CAD (coronary artery disease)      Hypothyroidism      S/P hysterectomy          FAMILY HISTORY:  No pertinent family history in first degree relatives        VITAL SIGNS:  T(F): 98.6 (08-18-22 @ 00:00)  HR: 70 (08-18-22 @ 07:00)  BP: 113/60 (08-18-22 @ 06:00)  RR: 12 (08-18-22 @ 07:00)  SpO2: 97% (08-17-22 @ 19:00)  Wt(kg): --    PHYSICAL EXAM:    Constitutional: NAD  Eyes: EOMI, sclera non-icteric  Respiratory: CTA b/l,   Cardiovascular: systolic murmur PPM site healing but with some inflammation and tenderness at site  Gastrointestinal: soft, NTND,   Extremities: no c/c/e  Neurological: AAOx3    MEDICATIONS  (STANDING):  acyclovir   Oral Tab/Cap 400 milliGRAM(s) Oral two times a day  aspirin enteric coated 81 milliGRAM(s) Oral daily  atorvastatin 40 milliGRAM(s) Oral at bedtime  calcium carbonate    500 mG (Tums) Chewable 2 Tablet(s) Chew at bedtime  carvedilol 3.125 milliGRAM(s) Oral every 12 hours  ferrous    sulfate 325 milliGRAM(s) Oral daily  furosemide   Injectable 20 milliGRAM(s) IV Push two times a day  levothyroxine 100 MICROGram(s) Oral daily  sacubitril 24 mG/valsartan 26 mG 1 Tablet(s) Oral two times a day    MEDICATIONS  (PRN):  acetaminophen     Tablet .. 650 milliGRAM(s) Oral every 6 hours PRN Temp greater or equal to 38C (100.4F), Mild Pain (1 - 3)  ALBUTerol    90 MICROgram(s) HFA Inhaler 2 Puff(s) Inhalation every 6 hours PRN Shortness of Breath and/or Wheezing  aluminum hydroxide/magnesium hydroxide/simethicone Suspension 30 milliLiter(s) Oral every 4 hours PRN Dyspepsia  ondansetron Injectable 4 milliGRAM(s) IV Push every 8 hours PRN Nausea and/or Vomiting      Allergies    No Known Allergies    Intolerances        LABS:                        10.3   7.86  )-----------( 189      ( 18 Aug 2022 05:17 )             31.3     08-18    145  |  112<H>  |  45<H>  ----------------------------<  128<H>  3.3<L>   |  29  |  0.86    Ca    8.8      18 Aug 2022 05:17  Mg     1.9     08-18            RADIOLOGY & ADDITIONAL TESTS:  Studies reviewed.

## 2022-08-18 NOTE — PROGRESS NOTE ADULT - ASSESSMENT
87F with PMH of Waldenstrom's Dz, CHF, admitted with NSTEMI. Underwent cath yesterday, showing non-obs CAD. However the patient had episodes of CHB and had a TVP placed. She is now S/P PPM placement(BiVICD).  Patient is now  downgraded to Tele.      A/P:  NSTEMI: S/P Cath with non-obstructive CAD. Maintained on ASA only, due to bleeding risk with Calquence.   Need to mobilize and have PT evaluate for mobility.  CHB: S/P PPM, currenlty in NSR. can downgrade to telemetry floor   add coreg as discussed with Card   f/u with EPS for PPM care  HTN CAD CHF: The patient has acute CHF - mixed - systolic and diastolic heart failure - to continue Entresto and IV Lasix BID  started on BB, low salt diet, daily weights, 1.5 L fluid restriction  transition to PO lasix probably tmr   Waldenstrom Disease currently on Calquence, resume care as outpatient per Oncology   Hypothyroidism - cont levothyroxine     VTE Px - SCDs      87F with PMH of [facility]'s Dz, CHF, admitted with NSTEMI. Underwent cath yesterday, showing non-obs CAD. However the patient had episodes of CHB and had a TVP placed. She is now S/P PPM placement(BiVICD).  Patient is now  downgraded to Tele.      A/P:  NSTEMI: S/P Cath with non-obstructive CAD. Maintained on ASA only, due to bleeding risk with Calquence.   Need to mobilize and have PT evaluate for mobility.  CHB: S/P PPM, currenlty in NSR. can downgrade to telemetry floor    f/u with EPS for PPM care    HTN CAD CHF: The patient has acute CHF - mixed - systolic and diastolic heart failure - to continue Entresto   She was hypotensive today - was on IV lasix - will change to po lasix once a day  was started on low dose BB, continue for now  low salt diet, daily weights, 1.5 L fluid restriction  keep overnight, adjust cardiac meds     Waldenstrom Disease currently on Calquence, resume care as outpatient per Oncology     Hypothyroidism - cont levothyroxine     VTE Px - SCDs

## 2022-08-19 ENCOUNTER — TRANSCRIPTION ENCOUNTER (OUTPATIENT)
Age: 87
End: 2022-08-19

## 2022-08-19 VITALS — HEART RATE: 77 BPM | RESPIRATION RATE: 14 BRPM | DIASTOLIC BLOOD PRESSURE: 53 MMHG | SYSTOLIC BLOOD PRESSURE: 115 MMHG

## 2022-08-19 LAB
ADD ON TEST-SPECIMEN IN LAB: SIGNIFICANT CHANGE UP
ANION GAP SERPL CALC-SCNC: 4 MMOL/L — LOW (ref 5–17)
BUN SERPL-MCNC: 43 MG/DL — HIGH (ref 7–23)
CALCIUM SERPL-MCNC: 8.8 MG/DL — SIGNIFICANT CHANGE UP (ref 8.5–10.1)
CHLORIDE SERPL-SCNC: 112 MMOL/L — HIGH (ref 96–108)
CO2 SERPL-SCNC: 29 MMOL/L — SIGNIFICANT CHANGE UP (ref 22–31)
CREAT SERPL-MCNC: 0.77 MG/DL — SIGNIFICANT CHANGE UP (ref 0.5–1.3)
CULTURE RESULTS: SIGNIFICANT CHANGE UP
CULTURE RESULTS: SIGNIFICANT CHANGE UP
EGFR: 75 ML/MIN/1.73M2 — SIGNIFICANT CHANGE UP
GLUCOSE SERPL-MCNC: 128 MG/DL — HIGH (ref 70–99)
HCT VFR BLD CALC: 31.2 % — LOW (ref 34.5–45)
HGB BLD-MCNC: 10.3 G/DL — LOW (ref 11.5–15.5)
MAGNESIUM SERPL-MCNC: 2 MG/DL — SIGNIFICANT CHANGE UP (ref 1.6–2.6)
MCHC RBC-ENTMCNC: 33 GM/DL — SIGNIFICANT CHANGE UP (ref 32–36)
MCHC RBC-ENTMCNC: 34.4 PG — HIGH (ref 27–34)
MCV RBC AUTO: 104.3 FL — HIGH (ref 80–100)
PLATELET # BLD AUTO: 184 K/UL — SIGNIFICANT CHANGE UP (ref 150–400)
POTASSIUM SERPL-MCNC: 4.2 MMOL/L — SIGNIFICANT CHANGE UP (ref 3.5–5.3)
POTASSIUM SERPL-SCNC: 4.2 MMOL/L — SIGNIFICANT CHANGE UP (ref 3.5–5.3)
RBC # BLD: 2.99 M/UL — LOW (ref 3.8–5.2)
RBC # FLD: 15.3 % — HIGH (ref 10.3–14.5)
SODIUM SERPL-SCNC: 145 MMOL/L — SIGNIFICANT CHANGE UP (ref 135–145)
SPECIMEN SOURCE: SIGNIFICANT CHANGE UP
SPECIMEN SOURCE: SIGNIFICANT CHANGE UP
WBC # BLD: 8.19 K/UL — SIGNIFICANT CHANGE UP (ref 3.8–10.5)
WBC # FLD AUTO: 8.19 K/UL — SIGNIFICANT CHANGE UP (ref 3.8–10.5)

## 2022-08-19 PROCEDURE — 71045 X-RAY EXAM CHEST 1 VIEW: CPT | Mod: 26

## 2022-08-19 PROCEDURE — 99239 HOSP IP/OBS DSCHRG MGMT >30: CPT

## 2022-08-19 RX ORDER — SACUBITRIL AND VALSARTAN 24; 26 MG/1; MG/1
1 TABLET, FILM COATED ORAL
Qty: 60 | Refills: 0
Start: 2022-08-19 | End: 2022-09-17

## 2022-08-19 RX ORDER — CARVEDILOL PHOSPHATE 80 MG/1
1 CAPSULE, EXTENDED RELEASE ORAL
Qty: 60 | Refills: 0
Start: 2022-08-19 | End: 2022-09-17

## 2022-08-19 RX ORDER — ATORVASTATIN CALCIUM 80 MG/1
1 TABLET, FILM COATED ORAL
Qty: 30 | Refills: 0
Start: 2022-08-19 | End: 2022-09-17

## 2022-08-19 RX ORDER — LOSARTAN/HYDROCHLOROTHIAZIDE 100MG-25MG
1 TABLET ORAL
Qty: 0 | Refills: 0 | DISCHARGE

## 2022-08-19 RX ORDER — ASPIRIN/CALCIUM CARB/MAGNESIUM 324 MG
1 TABLET ORAL
Qty: 30 | Refills: 0
Start: 2022-08-19 | End: 2022-09-17

## 2022-08-19 RX ORDER — ACALABRUTINIB 100 MG/1
1 CAPSULE, GELATIN COATED ORAL
Qty: 0 | Refills: 0 | DISCHARGE

## 2022-08-19 RX ORDER — FUROSEMIDE 40 MG
1 TABLET ORAL
Qty: 30 | Refills: 0
Start: 2022-08-19 | End: 2022-09-17

## 2022-08-19 RX ADMIN — CARVEDILOL PHOSPHATE 3.12 MILLIGRAM(S): 80 CAPSULE, EXTENDED RELEASE ORAL at 11:25

## 2022-08-19 RX ADMIN — SACUBITRIL AND VALSARTAN 1 TABLET(S): 24; 26 TABLET, FILM COATED ORAL at 11:24

## 2022-08-19 RX ADMIN — Medication 325 MILLIGRAM(S): at 11:25

## 2022-08-19 RX ADMIN — Medication 400 MILLIGRAM(S): at 12:21

## 2022-08-19 RX ADMIN — Medication 81 MILLIGRAM(S): at 11:24

## 2022-08-19 RX ADMIN — Medication 20 MILLIGRAM(S): at 12:25

## 2022-08-19 RX ADMIN — Medication 100 MICROGRAM(S): at 06:13

## 2022-08-19 NOTE — PROGRESS NOTE ADULT - ASSESSMENT
86 yo F with above PMHx who presents because feeling unwell and SOB found to be in acute hypoxemic resp failure 2/2 acute CHF exacerbation from NSTEMI, complete heart block/second degree type 2 AV block. s/p BiP    -Echo showing severe pulm HTN with dilated and noncollapsing IVC with pleural effusion - likely from her dCHF exacerbation from her ischemia  Continue lasix IV BID and entresto- if chem anbd BNP stable/ improving--continue lasix PO  s/p BIVP--- on Coreg with pacing and HR ~70    -C/w ASA,  Statin    follow up AM labs for BNP, potassium levels    recommend PT for discharge planning    recommend outpatient follow up echo to re-evaluate LV function and pulm pressure once stabilized      DC planning

## 2022-08-19 NOTE — DISCHARGE NOTE NURSING/CASE MANAGEMENT/SOCIAL WORK - NSDCPEFALRISK_GEN_ALL_CORE
For information on Fall & Injury Prevention, visit: [email or web address] OR  [email or web address] OR  https://www.cdc.gov/steadi/patient.html

## 2022-08-19 NOTE — PROGRESS NOTE ADULT - SUBJECTIVE AND OBJECTIVE BOX
INTERVAL HPI/OVERNIGHT EVENTS:  Patient S&E at bedside. No o/n events, VS reviewed on RA, /45- now on lasix 20 mg po qd   Today, WBC 8.  19, Hb 10.3, plt 184  no complaints today     PAST MEDICAL & SURGICAL HISTORY:  Hypertension      Waldenstrom&#x27;s disease      HLD (hyperlipidemia)      CAD (coronary artery disease)      Hypothyroidism      S/P hysterectomy          FAMILY HISTORY:  No pertinent family history in first degree relatives        VITAL SIGNS:  T(F): 98.8 (08-19-22 @ 05:36)  HR: 75 (08-19-22 @ 02:00)  BP: 107/45 (08-19-22 @ 02:00)  RR: 16 (08-19-22 @ 02:00)  SpO2: --  Wt(kg): --    PHYSICAL EXAM:    Constitutional: NAD  Eyes: EOMI, sclera non-icteric  Respiratory: CTA b/l,   Cardiovascular: systolic murmur PPM site healing but with some inflammation and tenderness at site  Gastrointestinal: soft, NTND,   Extremities: no c/c/e  Neurological: AAOx3      MEDICATIONS  (STANDING):  acyclovir   Oral Tab/Cap 400 milliGRAM(s) Oral two times a day  aspirin enteric coated 81 milliGRAM(s) Oral daily  atorvastatin 40 milliGRAM(s) Oral at bedtime  calcium carbonate    500 mG (Tums) Chewable 2 Tablet(s) Chew at bedtime  carvedilol 3.125 milliGRAM(s) Oral every 12 hours  ferrous    sulfate 325 milliGRAM(s) Oral daily  furosemide    Tablet 20 milliGRAM(s) Oral daily  levothyroxine 100 MICROGram(s) Oral daily  sacubitril 24 mG/valsartan 26 mG 1 Tablet(s) Oral two times a day    MEDICATIONS  (PRN):  acetaminophen     Tablet .. 650 milliGRAM(s) Oral every 6 hours PRN Temp greater or equal to 38C (100.4F), Mild Pain (1 - 3)  ALBUTerol    90 MICROgram(s) HFA Inhaler 2 Puff(s) Inhalation every 6 hours PRN Shortness of Breath and/or Wheezing  aluminum hydroxide/magnesium hydroxide/simethicone Suspension 30 milliLiter(s) Oral every 4 hours PRN Dyspepsia  ondansetron Injectable 4 milliGRAM(s) IV Push every 8 hours PRN Nausea and/or Vomiting      Allergies    No Known Allergies    Intolerances        LABS:                        10.3   8.19  )-----------( 184      ( 19 Aug 2022 06:32 )             31.2     08-18    145  |  112<H>  |  45<H>  ----------------------------<  128<H>  3.3<L>   |  29  |  0.86    Ca    8.8      18 Aug 2022 05:17  Mg     1.9     08-18            RADIOLOGY & ADDITIONAL TESTS:  Studies reviewed.   INTERVAL HPI/OVERNIGHT EVENTS:  Patient S&E at bedside. No o/n events, VS reviewed on RA, /45- now on lasix 20 mg po qd   Today, WBC 8.  19, Hb 10.3, plt 184  no complaints today - wants to go home    PAST MEDICAL & SURGICAL HISTORY:  Hypertension      Waldenstrom&#x27;s disease      HLD (hyperlipidemia)      CAD (coronary artery disease)      Hypothyroidism      S/P hysterectomy          FAMILY HISTORY:  No pertinent family history in first degree relatives        VITAL SIGNS:  T(F): 98.8 (08-19-22 @ 05:36)  HR: 75 (08-19-22 @ 02:00)  BP: 107/45 (08-19-22 @ 02:00)  RR: 16 (08-19-22 @ 02:00)  SpO2: --  Wt(kg): --    PHYSICAL EXAM:    Constitutional: NAD  Eyes: EOMI, sclera non-icteric  Respiratory: CTA b/l,   Cardiovascular: systolic murmur PPM site healing but with some inflammation and tenderness at site  Gastrointestinal: soft, NTND,   Extremities: no c/c/e  Neurological: AAOx3      MEDICATIONS  (STANDING):  acyclovir   Oral Tab/Cap 400 milliGRAM(s) Oral two times a day  aspirin enteric coated 81 milliGRAM(s) Oral daily  atorvastatin 40 milliGRAM(s) Oral at bedtime  calcium carbonate    500 mG (Tums) Chewable 2 Tablet(s) Chew at bedtime  carvedilol 3.125 milliGRAM(s) Oral every 12 hours  ferrous    sulfate 325 milliGRAM(s) Oral daily  furosemide    Tablet 20 milliGRAM(s) Oral daily  levothyroxine 100 MICROGram(s) Oral daily  sacubitril 24 mG/valsartan 26 mG 1 Tablet(s) Oral two times a day    MEDICATIONS  (PRN):  acetaminophen     Tablet .. 650 milliGRAM(s) Oral every 6 hours PRN Temp greater or equal to 38C (100.4F), Mild Pain (1 - 3)  ALBUTerol    90 MICROgram(s) HFA Inhaler 2 Puff(s) Inhalation every 6 hours PRN Shortness of Breath and/or Wheezing  aluminum hydroxide/magnesium hydroxide/simethicone Suspension 30 milliLiter(s) Oral every 4 hours PRN Dyspepsia  ondansetron Injectable 4 milliGRAM(s) IV Push every 8 hours PRN Nausea and/or Vomiting      Allergies    No Known Allergies    Intolerances        LABS:                        10.3   8.19  )-----------( 184      ( 19 Aug 2022 06:32 )             31.2     08-18    145  |  112<H>  |  45<H>  ----------------------------<  128<H>  3.3<L>   |  29  |  0.86    Ca    8.8      18 Aug 2022 05:17  Mg     1.9     08-18            RADIOLOGY & ADDITIONAL TESTS:  Studies reviewed.

## 2022-08-19 NOTE — PROGRESS NOTE ADULT - ASSESSMENT
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 Keyes at [facility], CAD, HLD, HTN, hypothyroid came to ED by family c/o difficulty breathing and pain to arms and back now with concern for CHb with nonobstructive cad, now s/p BiV PPM.     # Waldenstroms Macroglobulinemia   - Waldenstrom macroglobulinemia (bone marrow biopsy 12/15/2020 showing extensive low-grade B-cell lymphoproliferative infiltrate; IgM 5g), treated in ambulatory with weekly dexamethasone/bortezomib at [facility] and then transitioned to [facility]  - pt has been on calquence with very good response, IgM 260 in 7/2022 (was over 5000 at diagnosis)  - Calquence known to cause hemorrhage as well as afib/aflutter  - will continue to HOLD Acalabrutinib - discussed with [facility] team Dr. 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 Wale Beck  cell: [phone]  Weekends and nights please call [phone] for MD on call  [facility]  Hematology/Oncology  87 year old female w hx Waldenstrom disease on calquence [place] Key[place] [facility], HLD, HTN, hypothyroid came to ED by family c/o difficulty breathing and pain to arms and back now with concern for CHb with nonobstructive cad, now s/p BiV PPM.     # Waldenstroms Macroglobulinemia   - Waldenstrom macroglobulinemia (bone marrow biopsy 12/15/2020 showing extensive low-grade B-cell lymphoproliferative infiltrate; IgM 5g), treated in ambulatory with weekly dexamethasone/bortezomib at [facility] and then transitioned [place] [facility]  - pt has been on calquence with very good response, IgM 260 in 7/2022 (was over 5000 at diagnosis)  - Calquence known to cause hemorrhage as well as afib/aflutter  - will continue to HOLD Acalabrutinib - discussed with [facility] team Dr. 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 Wale Beck  cell: [phone]  Weekends and nights please call [phone] for MD on call  [facility]  Hematology/Oncology

## 2022-08-19 NOTE — DISCHARGE NOTE PROVIDER - NSDCMRMEDTOKEN_GEN_ALL_CORE_FT
acyclovir 400 mg oral tablet: 1 tab(s) orally 2 times a day  albuterol 90 mcg/inh inhalation aerosol: 1 puff(s) inhaled every 6 hours, As Needed -Shortness of Breath and/or Wheezing   aspirin 81 mg oral delayed release tablet: 1 tab(s) orally once a day  atorvastatin 40 mg oral tablet: 1 tab(s) orally once a day (at bedtime)  calcium (as carbonate) 600 mg oral tablet: 2 tab(s) orally once a day (at bedtime)  carvedilol 3.125 mg oral tablet: 1 tab(s) orally every 12 hours  Centrum Adults oral tablet: 1 tab(s) orally once a day  ferrous sulfate: orally once a day  furosemide 20 mg oral tablet: 1 tab(s) orally once a day  levothyroxine 100 mcg (0.1 mg) oral tablet: 1 tab(s) orally once a day  sacubitril-valsartan 24 mg-26 mg oral tablet: 1 tab(s) orally 2 times a day

## 2022-08-19 NOTE — PHYSICAL THERAPY INITIAL EVALUATION ADULT - GENERAL OBSERVATIONS, REHAB EVAL
HM; BP cuff; pulse oxym; pt rec'd in bed supine; HR 75; /53; O2 Sat 92%; RR 16; pt denied pain; L CW incision noted

## 2022-08-19 NOTE — PHYSICAL THERAPY INITIAL EVALUATION ADULT - MODALITIES TREATMENT COMMENTS
pt left seated in chair post Eval; chair alarm donned; all above lines/monitors in place; callbell in reach; pt instructed not to get up alone; call nursing for assist; rosa well; denied pain; RN Sangeeta made aware pt OOB

## 2022-08-19 NOTE — DISCHARGE NOTE NURSING/CASE MANAGEMENT/SOCIAL WORK - PATIENT PORTAL LINK FT
You can access the FollowMyHealth Patient Portal offered by [facility] by registering at the following website: [email or web address]. By joining [facility]’s FollowMyHealth portal, you will also be able to view your health information using other applications (apps) compatible with our system.

## 2022-08-19 NOTE — DISCHARGE NOTE PROVIDER - PROVIDER TOKENS
PROVIDER:[TOKEN:[[id number]],FOLLOWUP:[1 week]],FREE:[LAST:[DR Keyes],PHONE:[(   )    -],FAX:[(   )    -],ADDRESS:[at [facility]FOLLOWUP:[1 week]],PROVIDER:[TOKEN:[[id number]],FOLLOWUP:[2 weeks]]

## 2022-08-19 NOTE — PROGRESS NOTE ADULT - REASON FOR ADMISSION
Acute hypoxic resp failure  Acute pulmonary edema

## 2022-08-19 NOTE — DISCHARGE NOTE PROVIDER - NSDCCPCAREPLAN_GEN_ALL_CORE_FT
PRINCIPAL DISCHARGE DIAGNOSIS  Diagnosis: Acute respiratory failure with hypoxia  Assessment and Plan of Treatment: due to NSTEMI and CHF ( heart attack and heart failure)  You also  had a complete heart block  a pacemaker was placed 8/16/22  continue lasix entresto and coreg;   cont cholesterol medication  cont aspirin  see Dr Tarango in 1-2 weeks  low sodium diet , low fat diet, check your weight daily.  If it goes up by more than 2 pounds call your cardiologist.  if you have chest pain or trouble breathing, return to the ED

## 2022-08-19 NOTE — DISCHARGE NOTE NURSING/CASE MANAGEMENT/SOCIAL WORK - NSDCFUADDAPPT_GEN_ALL_CORE_FT
Patients states she will make appts with her sons when she gets home.  Dr Tarango  Patients states she will make appts with her sons when she gets home.  Dr Tarango   F/U appt with EP here at [facility] on 09/01/22 @2:15pm  [phone]

## 2022-08-19 NOTE — DISCHARGE NOTE PROVIDER - NSDCFUSCHEDAPPT_GEN_ALL_CORE_FT
[facility] Physician [address]  Scheduled Appointment: 09/01/2022

## 2022-08-19 NOTE — DISCHARGE NOTE PROVIDER - HOSPITAL COURSE
CC: 87F with PMH of [facility], CHF, admitted with NSTEMI.   Underwent cath yesterday, showing non-obstructive CAD.   However the patient had episodes of CHB and had a TVP placed.   She is now S/P PPM placement(BiVICD). CHF was treated, Meds were oprimized and she is stable for discharge.    HOSPITAL COURSE:   NSTEMI: S/P Cath with non-obstructive CAD. Maintained on ASA only, due to bleeding risk with Calquence.   Need to mobilize and have PT evaluate for mobility.  CHB: S/P PPM, currenlty in NSR. can downgrade to telemetry floor   f/u with EPS for PPM care    HTN CAD CHF: The patient has acute CHF - mixed - systolic and diastolic heart failure - to continue Entresto   She was hypotensive yesterday - was on IV lasix - will change to po lasix once a day  was started on low dose BB, continue for now  low salt diet, daily weights, 1.5 L fluid restriction  keep overnight, adjust cardiac meds   See Card in 1-2 weeks    [facility] currently on Calquence, resume care as outpatient per Oncology     Hypothyroidism - cont levothyroxine     VTE Px - SCDs     OTHER DETAILS:   8/18 - an episode of hypotension 76/63 but pt asymptomatic. was oob today; denies cp palps sob  8/19 - no cp palps sob abdo pain; no lightheadedness   PHYS EXAMN:  T(F): 97.4 (19 Aug 2022 08:25), Max: 98.8 (18 Aug 2022 17:58)  HR: 71 (19 Aug 2022 08:00) (71 - 90)  BP: 115/48 (19 Aug 2022 08:00) (76/63 - 131/61)  BP(mean): 66 (19 Aug 2022 08:00) (42 - 83)  RR: 17 (19 Aug 2022 08:00) (12 - 23)  GENERAL: NAD, sitting u pin chair, chest wall - post-surgical  HEAD:  Atraumatic, Normocephalic  EYES: EOMI, PERRLA, normal sclera  ENT: Moist mucous membranes  NECK: Supple, No JVD, no nuchal rigidity  CHEST/LUNG: Clear to auscultation bilaterally; No rales, rhonchi, wheezing, or rubs. Unlabored respirations  HEART: Regular rate and rhythm; No murmurs, rubs, or gallops  ABDOMEN: Bowel sounds present; Soft, Nontender, Nondistended. No hepatomegaly  EXTREMITIES:  no pitting bilaterally  NERVOUS SYSTEM:  Alert & Oriented X3, speech clear. No focal motor or sensory deficits  MSK: FROM all 4 extremities, full and equal strength  SKIN: No rashes or lesions    LABS AND RADIOLOGY   < from: CT Angio Chest PE Protocol w/ IV Cont (08.13.22 @ 23:53) >  No CT evidence of pulmonary embolism.  Findings suggest pulmonary edema with small bilateral pleural effusions;   correlate clinically for superimposed infection.  Ill-defined subcarinal adenopathy probably related to history of lymphoma.  < from: TTE Echo Complete w/o Contrast w/ Doppler (08.15.22 @ 08:12) >   Moderate (2+) mitral regurgitation is present.   The aortic valve appears mildly calcified. Valve opening seems to be   restricted. Mild to moderate aortic stenosis is present.   Moderate to severe (3+) eccentric tricuspid valve regurgitation is   present.   Severe pulmonary hypertension.   Severe hypokinesis of the anteroseptal and inferoseptal walls.   Hypokinesis of the anterior wall is noted.   The apex is severely hypokinetic with sparing of the inferior wall.   Estimated left ventricular ejection fraction is about 40 %.   The right atrium appears mildly dilated.   The right ventricle is mildly dilated.   IVC is dilated and not collapsing with inspiration.   Pleural effusion -is present.     LABS: All Labs Reviewed:                        10.3   8.19  )-----------( 184      ( 19 Aug 2022 06:32 )             31.2   145  |  112<H>  |  43<H>  ----------------------------<  128<H>  4.2   |  29  |  0.77      time spent on discharge - 50 mins

## 2022-08-19 NOTE — DISCHARGE NOTE PROVIDER - NSCORESITESY/N_GEN_A_CORE_RD
You can access the [facility] Patient Portal, offered by [facility], by registering with the following website: [email or web address]
Yes

## 2022-08-19 NOTE — PROGRESS NOTE ADULT - SUBJECTIVE AND OBJECTIVE BOX
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 She currently denies every having SOB but it was reported to be her main complaint when she came in. She reports now that she had weird sensations in her arms and just did not feel well iwth nausea but no vomiting.    She was found to be in acute hypoxemic resp failure 2/2 pulm edema/CHF and elevated troponins, placed on BiPAP.    : Hypoxemia improved, now on NC and saturating well. No longer has that feeling of unwell or arm symptoms. She denies SOB. Her troponin has increased significant and now she is in complete heart block - asymptomatic.  She denies any fevers, chills, CP, palp, abd pain, vomiting, dizziness, syncope, orthopnea, PND.  8/15: Patient had bradycardia to the 30s O/N. Rhythm is second degree type 2 AV block with episodes of CHB - asymptomatic. She feels well without any current complaints. Still requiring O2      no acute issues overnight    hypotension yesterday, better today -- no acute issues     PAST MEDICAL HX  CAD (coronary artery disease)   HLD (hyperlipidemia)   HTN hypertension   Hypothyroidism   Waldenstrom's disease.     PAST SURGICAL HX  Upper endoscopy for heartburn + H pylori years ago that was treated  Colonoscopy  S/P hysterectomy.     FAMILY HX  No pertinent family history in first degree relatives. HTN, DM II    SOCIAL HX  , lives w   never smoker  no alcohol  usually independent w some assistance by daughter    Allergies  No Known Allergies    REVIEW OF SYSTEMS:  10 system ROS was obtained, all pertinent positives and negatives are in HPI otherwise they were negative.    Daily Weight in k (15 Aug 2022 06:00)          Vital Signs Last 24 Hrs  T(C): 37.1 (19 Aug 2022 05:36), Max: 37.1 (18 Aug 2022 17:58)  T(F): 98.8 (19 Aug 2022 05:36), Max: 98.8 (18 Aug 2022 17:58)  HR: 75 (19 Aug 2022 02:00) (72 - 90)  BP: 107/45 (19 Aug 2022 02:00) (76/63 - 121/56)  BP(mean): 61 (19 Aug 2022 02:00) (42 - 82)  RR: 16 (19 Aug 2022 02:00) (12 - 23)  SpO2: --          PHYSICAL EXAM:   Constitutional: awake and alert in NAD  HEENT: EOMI, Normal Hearing, MMM  Neck: Soft and supple, No LAD, + JVD, no carotid bruit  Respiratory: CTA B/L with decreased breath sounds at bases  Cardiovascular: bradycardiac, Soft systolic ejection murmur at RUSB with III/VI systolic murmur at LSB, PPM site healing  Gastrointestinal: Bowel Sounds present, soft, nontender, nondistended, no guarding, no rebound  Extremities: Warm and well perfused, no peripheral edema  Neurological: A/O x 3, no focal deficits appreciated  Skin: No rashes appreciated            MEDICATIONS  (STANDING):  acyclovir   Oral Tab/Cap 400 milliGRAM(s) Oral two times a day  aspirin enteric coated 81 milliGRAM(s) Oral daily  atorvastatin 40 milliGRAM(s) Oral at bedtime  calcium carbonate    500 mG (Tums) Chewable 2 Tablet(s) Chew at bedtime  carvedilol 3.125 milliGRAM(s) Oral every 12 hours  ferrous    sulfate 325 milliGRAM(s) Oral daily  furosemide    Tablet 20 milliGRAM(s) Oral daily  levothyroxine 100 MICROGram(s) Oral daily  sacubitril 24 mG/valsartan 26 mG 1 Tablet(s) Oral two times a day    MEDICATIONS  (PRN):  acetaminophen     Tablet .. 650 milliGRAM(s) Oral every 6 hours PRN Temp greater or equal to 38C (100.4F), Mild Pain (1 - 3)  ALBUTerol    90 MICROgram(s) HFA Inhaler 2 Puff(s) Inhalation every 6 hours PRN Shortness of Breath and/or Wheezing  aluminum hydroxide/magnesium hydroxide/simethicone Suspension 30 milliLiter(s) Oral every 4 hours PRN Dyspepsia  ondansetron Injectable 4 milliGRAM(s) IV Push every 8 hours PRN Nausea and/or Vomiting          145  |  112<H>  |  45<H>  ----------------------------<  128<H>  3.3<L>   |  29  |  0.86    Ca    8.8      18 Aug 2022 05:17  Phos  2.6     08-16  Mg     1.9     08-18                          10.3   7.86  )-----------( 189      ( 18 Aug 2022 05:17 )             31.3           RADIOLOGY:    Reviewed in EMR    EK/14/22, my interpretation: CHB at 43bpm    TELEMETRY:    Reviewed, varying between second degree type two AV block and complete heart block    ECHO:    ACC: [id number] EXAM:  ECHO TTE WO CON COMP W DOPP                        PROCEDURE DATE:  08/15/2022      M-Mode Measurements (cm)     LVEDd: 4.76 cm            LVESd: 3.93 cm   IVSEd: 0.94 cm  LVPWd: 0.94 cm            AO Root Dimension: 2.9 cm                             ACS: 1.5 cm                             LA: 3.8 cm                             LVOT: 1.7 cm    Doppler Measurements:     AV Velocity:281 cm/s                 MV Peak E-Wave: 147 cm/s   AV Peak Gradient: 31.58 mmHg         MV Peak A-Wave: 133 cm/s   AV Mean Gradient: 15 mmHg            MV E/A Ratio: 1.11 %   AV Area (Continuity):1.09 cm^2       MV Peak Gradient: 8.64 mmHg   TR Velocity:406 cm/s   TR Gradient:65.9344 mmHg   Estimated RAP:15 mmHg   RVSP:81 mmHg     Impression     The mitral valve leaflets appear thickened.   Moderate (2+) mitral regurgitation is present.   The aortic valve appears mildly calcified. Valve opening seems to be restricted.   There are fibrocalcific changes noted to the aortic valve leaflets with restriction in leaflet excursion. Transaortic gradients are    underestimated due to impaired left ventricle systolic function. Mild to moderate aortic stenosis is present.   Mild aortic insufficiency noted.   The tricuspid valve leaflets appear mildly thickened and/or calcified, but open well. moderate to severe (3+) eccentric tricuspid valve       regurgitation is present.   Severe pulmonary hypertension.   Normal appearing pulmonic valve structure.   Mild pulmonic valvular regurgitation (1+) is present.   The left atrium is mildly dilated.   Severe hypokinesis of the anteroseptal and inferoseptal walls.   Hypokinesis of the anterior wall is noted.   The apex is severely hypokinetic with sparing of the inferior wall.   Estimated left ventricular ejection fraction is about 40 %.   The right atrium appears mildly dilated.   The right ventricle is mildly dilated.   IVC is dilated and not collapsing with inspiration.   Pleural effusion -is present.

## 2022-08-19 NOTE — PROGRESS NOTE ADULT - TIME BILLING
see above
see above
Differential diagnosis and plan of care discussed with patient after the evaluation.   Counseling on diet, exercise, and medication compliance was done.  Counseling on smoking and alcohol cessation was offered when appropriate.  Pain assessed and judicious use of narcotics when appropriate was discussed.  Importance of fall prevention discussed.  Advanced care planning was discussed with patient and family.
see above
see above

## 2022-08-19 NOTE — DISCHARGE NOTE PROVIDER - CARE PROVIDERS DIRECT ADDRESSES
,DirectAddress_Unknown,DirectAddress_Unknown,[email or web address]

## 2022-08-19 NOTE — DISCHARGE NOTE NURSING/CASE MANAGEMENT/SOCIAL WORK - HISTORY OF COVID-19 VACCINATION
Infectious Diseases progress note:    Subjective: For dialysis today.  No acute o/n events.  No new fevers or leukocytosis.     ROS:  CONSTITUTIONAL:  No fever, chills, rigors  CARDIOVASCULAR:  No chest pain or palpitations  RESPIRATORY:   No SOB, cough, dyspnea on exertion.  No wheezing  GASTROINTESTINAL:  No abd pain, N/V, diarrhea/constipation  EXTREMITIES:  No swelling or joint pain  GENITOURINARY:  No burning on urination, increased frequency or urgency.  No flank pain  NEUROLOGIC:  No HA, visual disturbances  SKIN: No rashes    Allergies    No Known Allergies    Intolerances        ANTIBIOTICS/RELEVANT:  antimicrobials  ciprofloxacin     Tablet 250 milliGRAM(s) Oral daily    immunologic:    OTHER:  ALBUTerol    90 MICROgram(s) HFA Inhaler 1 Puff(s) Inhalation every 4 hours  aspirin  chewable 81 milliGRAM(s) Oral daily  docusate sodium 100 milliGRAM(s) Oral daily  heparin  Injectable 5000 Unit(s) SubCutaneous every 12 hours  midodrine 10 milliGRAM(s) Oral every 8 hours  pantoprazole    Tablet 40 milliGRAM(s) Oral before breakfast      Objective:  Vital Signs Last 24 Hrs  T(C): 36.8 (17 Jan 2019 16:00), Max: 37.1 (17 Jan 2019 01:00)  T(F): 98.2 (17 Jan 2019 16:00), Max: 98.7 (17 Jan 2019 01:00)  HR: 115 (17 Jan 2019 16:00) (94 - 115)  BP: 104/65 (17 Jan 2019 16:00) (95/60 - 108/60)  BP(mean): --  RR: 16 (17 Jan 2019 16:00) (16 - 22)  SpO2: 94% (17 Jan 2019 16:00) (84% - 100%)    PHYSICAL EXAM:  Constitutional:NAD  Eyes:CHER, EOMI  Ear/Nose/Throat: no thrush, mucositis.  Moist mucous membranes	  Neck:no JVD, no lymphadenopathy, supple  Respiratory: CTA adore  Cardiovascular: S1S2 RRR, no murmurs  Gastrointestinal:soft, nontender,  nondistended (+) BS  Extremities:no e/e/c  Skin:  Rt posterior skin wounds dsg c/d/i        LABS:                        10.3   4.91  )-----------( 149      ( 17 Jan 2019 06:30 )             34.5     01-17    x   |  x   |  x   ----------------------------<  x   4.2   |  x   |  x     Ca    9.3      17 Jan 2019 06:30            Vancomycin Level, Random: 22.0: Therapeutic ranges have not been established for random  vancomycin. Interpret results in context of patient's  clinical condition and time sample was drawn relative to  peak and trough therapeutic ranges. Therapeutic ranges for  peak vancomycin are 25-50 and for trough vancomycin 10-20  with 15-20 mcg/mL used for complicated infections. ug/mL (01.17.19 @ 06:30)          MICROBIOLOGY:      Culture - Blood (01.09.19 @ 22:24)    Culture - Blood:   NO ORGANISMS ISOLATED    Specimen Source: BLOOD    Culture - Blood (01.09.19 @ 22:24)    Culture - Blood:   NO ORGANISMS ISOLATED    Specimen Source: BLOOD PERIPHERAL    Culture - Body Fluid with Gram Stain (01.09.19 @ 15:41)    Culture - Body Fluid:   NO GROWTH - PRELIMINARY RESULTS  NO ORGANISMS ISOLATED AT 24 HOURS  NO ORGANISMS ISOLATED AT 48 HRS.  NO ORGANISMS ISOLATED AT 72 HRS.  NO GROWTH AFTER 4 DAYS INCUBATION  NO GROWTH AFTER 5 DAYS INCUBATION    Gram Stain:   WBC^White Blood Cells  QNTY CELLS IN GRAM STAIN: FEW (2+)  NOS^No Organisms Seen    Specimen Source: PLEURAL FLUID    Culture - Respiratory with Gram Stain (01.08.19 @ 20:04)    Culture - Respiratory:   Yeast species, not Cryptococcus neoformans  YEAST^YEAST.  QUANTITY OF GROWTH: MODERATE    Gram Stain Sputum:   WBC^White Blood Cells  QNTY CELLS IN GRAM STAIN: RARE (1+)  YEAST^YEAST.  QUANTITY OF BACTERIA SEEN: RARE (1+)    Specimen Source: BRONCHIAL LAVAGE        RADIOLOGY & ADDITIONAL STUDIES:    < from: Xray Chest 1 View- PORTABLE-Routine (01.17.19 @ 08:08) >  INTERPRETATION:     Is an enlarged cardiac silhouette. A left subcutaneous ICD is again   noted. The thoracic aorta is calcified.  Surgical clips again project over the right chest and upper abdomen. An   incompletely imaged pigtail catheter overlies the region of the right   lower chest/upper right abdomen. Note is again made of a lower right rib   deformity.  Small bilateral loculated pleural effusions, larger on the right, are not   significantly changed. There is likelyassociated passive atelectasis. No   pneumothorax is seen.  Mild pulmonary vascular redistribution/congestion again noted.              IMPRESSION:  No significant change in small bilateral loculated pleural   effusions, larger on the right.    Mild pulmonary vascular redistribution/congestion again noted.    < end of copied text > Yes

## 2022-08-19 NOTE — DISCHARGE NOTE PROVIDER - CARE PROVIDER_API CALL
Buddy Tarango)  Cardiovascular Disease; Internal Medicine; Nuclear Cardiology  [address], [address]  [place] [address]  Phone: [phone]  Fax: [phone]  Follow Up Time: 1 week    DR Keyes,   at [facility] [place]  Phone: (   )    -  Fax: (   )    -  Follow Up Time: 1 week    Oriana Sharp)  Cardiac Electrophysiology; Cardiovascular Disease; Internal Medicine  [address] [place] [address]  Phone: [phone]  Fax: [phone]  Follow Up Time: 2 weeks

## 2022-08-25 DIAGNOSIS — I21.4 NON-ST ELEVATION (NSTEMI) MYOCARDIAL INFARCTION: ICD-10-CM

## 2022-08-25 DIAGNOSIS — E78.5 HYPERLIPIDEMIA, UNSPECIFIED: ICD-10-CM

## 2022-08-25 DIAGNOSIS — I11.0 HYPERTENSIVE HEART DISEASE WITH HEART FAILURE: ICD-10-CM

## 2022-08-25 DIAGNOSIS — D53.9 NUTRITIONAL ANEMIA, UNSPECIFIED: ICD-10-CM

## 2022-08-25 DIAGNOSIS — I25.10 ATHEROSCLEROTIC HEART DISEASE OF NATIVE CORONARY ARTERY WITHOUT ANGINA PECTORIS: ICD-10-CM

## 2022-08-25 DIAGNOSIS — Z77.22 CONTACT WITH AND (SUSPECTED) EXPOSURE TO ENVIRONMENTAL TOBACCO SMOKE (ACUTE) (CHRONIC): ICD-10-CM

## 2022-08-25 DIAGNOSIS — I50.41 ACUTE COMBINED SYSTOLIC (CONGESTIVE) AND DIASTOLIC (CONGESTIVE) HEART FAILURE: ICD-10-CM

## 2022-08-25 DIAGNOSIS — E03.9 HYPOTHYROIDISM, UNSPECIFIED: ICD-10-CM

## 2022-08-25 DIAGNOSIS — I42.8 OTHER CARDIOMYOPATHIES: ICD-10-CM

## 2022-08-25 DIAGNOSIS — Z79.899 OTHER LONG TERM (CURRENT) DRUG THERAPY: ICD-10-CM

## 2022-08-25 DIAGNOSIS — I44.2 ATRIOVENTRICULAR BLOCK, COMPLETE: ICD-10-CM

## 2022-08-25 DIAGNOSIS — I08.1 RHEUMATIC DISORDERS OF BOTH MITRAL AND TRICUSPID VALVES: ICD-10-CM

## 2022-08-25 DIAGNOSIS — I27.22 PULMONARY HYPERTENSION DUE TO LEFT HEART DISEASE: ICD-10-CM

## 2022-08-25 DIAGNOSIS — C88.0 WALDENSTROM MACROGLOBULINEMIA: ICD-10-CM

## 2022-08-25 DIAGNOSIS — N17.9 ACUTE KIDNEY FAILURE, UNSPECIFIED: ICD-10-CM

## 2022-08-25 DIAGNOSIS — I95.9 HYPOTENSION, UNSPECIFIED: ICD-10-CM

## 2022-08-25 DIAGNOSIS — J96.01 ACUTE RESPIRATORY FAILURE WITH HYPOXIA: ICD-10-CM

## 2022-08-25 DIAGNOSIS — I47.2 VENTRICULAR TACHYCARDIA: ICD-10-CM

## 2022-08-25 DIAGNOSIS — D72.829 ELEVATED WHITE BLOOD CELL COUNT, UNSPECIFIED: ICD-10-CM

## 2022-08-30 ENCOUNTER — NON-APPOINTMENT (OUTPATIENT)
Age: 87
End: 2022-08-30

## 2022-08-30 ENCOUNTER — APPOINTMENT (OUTPATIENT)
Dept: ELECTROPHYSIOLOGY | Facility: CLINIC | Age: 87
End: 2022-08-30

## 2022-08-30 VITALS
OXYGEN SATURATION: 99 % | WEIGHT: 144 LBS | BODY MASS INDEX: 27.19 KG/M2 | HEIGHT: 61 IN | RESPIRATION RATE: 16 BRPM | HEART RATE: 70 BPM | SYSTOLIC BLOOD PRESSURE: 130 MMHG | DIASTOLIC BLOOD PRESSURE: 70 MMHG

## 2022-08-30 PROCEDURE — 99024 POSTOP FOLLOW-UP VISIT: CPT

## 2022-08-30 PROCEDURE — 93281 PM DEVICE PROGR EVAL MULTI: CPT

## 2022-08-30 RX ORDER — NEBIVOLOL HYDROCHLORIDE 5 MG/1
5 TABLET ORAL DAILY
Refills: 0 | Status: DISCONTINUED | COMMUNITY
Start: 2020-12-11 | End: 2022-08-30

## 2022-08-30 RX ORDER — DILTIAZEM HYDROCHLORIDE 240 MG/1
240 CAPSULE, EXTENDED RELEASE ORAL
Refills: 0 | Status: DISCONTINUED | COMMUNITY
Start: 2018-11-02 | End: 2022-08-30

## 2022-08-30 RX ORDER — HYDROCHLOROTHIAZIDE 12.5 MG/1
12.5 TABLET ORAL DAILY
Refills: 0 | Status: DISCONTINUED | COMMUNITY
Start: 2020-12-11 | End: 2022-08-30

## 2022-08-30 RX ORDER — DEXAMETHASONE 4 MG/1
4 TABLET ORAL
Qty: 120 | Refills: 1 | Status: DISCONTINUED | COMMUNITY
Start: 2020-12-30 | End: 2022-08-30

## 2022-09-14 ENCOUNTER — APPOINTMENT (OUTPATIENT)
Dept: ELECTROPHYSIOLOGY | Facility: CLINIC | Age: 87
End: 2022-09-14

## 2022-09-14 ENCOUNTER — NON-APPOINTMENT (OUTPATIENT)
Age: 87
End: 2022-09-14

## 2022-09-14 VITALS
DIASTOLIC BLOOD PRESSURE: 68 MMHG | SYSTOLIC BLOOD PRESSURE: 120 MMHG | WEIGHT: 142 LBS | OXYGEN SATURATION: 97 % | HEIGHT: 60 IN | HEART RATE: 69 BPM | BODY MASS INDEX: 27.88 KG/M2 | RESPIRATION RATE: 16 BRPM

## 2022-09-14 PROCEDURE — 99024 POSTOP FOLLOW-UP VISIT: CPT

## 2022-09-14 RX ORDER — OMEPRAZOLE 40 MG/1
40 CAPSULE, DELAYED RELEASE ORAL
Qty: 90 | Refills: 3 | Status: DISCONTINUED | COMMUNITY
Start: 2021-01-04 | End: 2022-09-14

## 2022-09-14 RX ORDER — NORMAL SALT TABLETS 1 G/G
1 TABLET ORAL
Refills: 0 | Status: DISCONTINUED | COMMUNITY
End: 2022-09-14

## 2022-09-14 RX ORDER — RANITIDINE HYDROCHLORIDE 300 MG/1
300 CAPSULE ORAL
Refills: 0 | Status: DISCONTINUED | COMMUNITY
Start: 2018-11-02 | End: 2022-09-14

## 2022-09-14 RX ORDER — LOSARTAN POTASSIUM 100 MG/1
100 TABLET, FILM COATED ORAL DAILY
Refills: 0 | Status: DISCONTINUED | COMMUNITY
Start: 2018-11-02 | End: 2022-09-14

## 2022-09-21 ENCOUNTER — APPOINTMENT (OUTPATIENT)
Dept: ELECTROPHYSIOLOGY | Facility: CLINIC | Age: 87
End: 2022-09-21

## 2022-09-21 VITALS
SYSTOLIC BLOOD PRESSURE: 128 MMHG | BODY MASS INDEX: 57.52 KG/M2 | WEIGHT: 293 LBS | HEIGHT: 60 IN | OXYGEN SATURATION: 95 % | HEART RATE: 72 BPM | DIASTOLIC BLOOD PRESSURE: 56 MMHG

## 2022-09-21 PROCEDURE — 99024 POSTOP FOLLOW-UP VISIT: CPT

## 2022-09-28 ENCOUNTER — APPOINTMENT (OUTPATIENT)
Dept: ELECTROPHYSIOLOGY | Facility: CLINIC | Age: 87
End: 2022-09-28

## 2022-09-28 VITALS
SYSTOLIC BLOOD PRESSURE: 123 MMHG | HEIGHT: 62 IN | RESPIRATION RATE: 16 BRPM | DIASTOLIC BLOOD PRESSURE: 56 MMHG | BODY MASS INDEX: 26.13 KG/M2 | OXYGEN SATURATION: 99 % | HEART RATE: 87 BPM | WEIGHT: 142 LBS

## 2022-09-28 PROCEDURE — 93281 PM DEVICE PROGR EVAL MULTI: CPT

## 2022-09-28 RX ORDER — FUROSEMIDE 20 MG/1
20 TABLET ORAL
Refills: 0 | Status: ACTIVE | COMMUNITY

## 2022-09-28 RX ORDER — ASPIRIN 81 MG/1
81 TABLET ORAL
Refills: 0 | Status: ACTIVE | COMMUNITY

## 2022-09-28 RX ORDER — CARVEDILOL 3.12 MG/1
3.12 TABLET, FILM COATED ORAL
Refills: 0 | Status: ACTIVE | COMMUNITY

## 2022-09-28 RX ORDER — SACUBITRIL AND VALSARTAN 24; 26 MG/1; MG/1
24-26 TABLET, FILM COATED ORAL
Refills: 0 | Status: ACTIVE | COMMUNITY

## 2022-10-11 ENCOUNTER — APPOINTMENT (OUTPATIENT)
Dept: ELECTROPHYSIOLOGY | Facility: CLINIC | Age: 87
End: 2022-10-11

## 2022-10-20 ENCOUNTER — APPOINTMENT (OUTPATIENT)
Dept: ELECTROPHYSIOLOGY | Facility: CLINIC | Age: 87
End: 2022-10-20

## 2022-10-20 VITALS
HEIGHT: 62 IN | DIASTOLIC BLOOD PRESSURE: 75 MMHG | SYSTOLIC BLOOD PRESSURE: 150 MMHG | BODY MASS INDEX: 26.13 KG/M2 | HEART RATE: 73 BPM | WEIGHT: 142 LBS | RESPIRATION RATE: 16 BRPM | OXYGEN SATURATION: 99 %

## 2022-10-20 PROCEDURE — 99024 POSTOP FOLLOW-UP VISIT: CPT

## 2022-10-20 RX ORDER — LEVOTHYROXINE SODIUM 0.1 MG/1
100 TABLET ORAL DAILY
Refills: 0 | Status: ACTIVE | COMMUNITY
Start: 2022-10-20

## 2022-10-20 RX ORDER — ATORVASTATIN CALCIUM 20 MG/1
20 TABLET, FILM COATED ORAL
Refills: 0 | Status: DISCONTINUED | COMMUNITY
End: 2022-10-20

## 2022-10-20 RX ORDER — APIXABAN 2.5 MG/1
2.5 TABLET, FILM COATED ORAL
Qty: 90 | Refills: 1 | Status: ACTIVE | COMMUNITY
Start: 2022-10-20

## 2023-01-04 ENCOUNTER — APPOINTMENT (OUTPATIENT)
Dept: ELECTROPHYSIOLOGY | Facility: CLINIC | Age: 88
End: 2023-01-04
Payer: MEDICARE

## 2023-01-04 VITALS
HEART RATE: 71 BPM | OXYGEN SATURATION: 97 % | SYSTOLIC BLOOD PRESSURE: 140 MMHG | HEIGHT: 62 IN | WEIGHT: 140 LBS | DIASTOLIC BLOOD PRESSURE: 59 MMHG | BODY MASS INDEX: 25.76 KG/M2 | RESPIRATION RATE: 16 BRPM

## 2023-01-04 PROCEDURE — 93281 PM DEVICE PROGR EVAL MULTI: CPT

## 2023-04-03 ENCOUNTER — APPOINTMENT (OUTPATIENT)
Dept: ELECTROPHYSIOLOGY | Facility: CLINIC | Age: 88
End: 2023-04-03
Payer: MEDICARE

## 2023-04-04 ENCOUNTER — NON-APPOINTMENT (OUTPATIENT)
Age: 88
End: 2023-04-04

## 2023-04-04 PROCEDURE — 93294 REM INTERROG EVL PM/LDLS PM: CPT

## 2023-04-04 PROCEDURE — 93296 REM INTERROG EVL PM/IDS: CPT

## 2023-04-23 NOTE — ED ADULT TRIAGE NOTE - NSWEIGHTCALCTOOLDRUG_GEN_A_CORE
Altered Mental Status HPI





- General


Chief Complaint: Altered Mental Status


Stated Complaint: Altered Mental


Time Seen by Provider: 23 19:31


Source: EMS


Mode of arrival: EMS


Limitations: altered mental status





- History of Present Illness


Initial Comments: 


Patient is a 54-year-old female presenting for altered mental status.  Patient 

was sent in from [facility].  Further details are unavailable, no report from EMS 

available.  Patient is able short conversation, is able to follow commands.  

Reviewing previous notes this appears to be consistent with her baseline.  She 

has history of chronic kidney disease requiring dialysis, records report that 

patient normally receives dialysis  and Friday.








- Related Data


                                Home Medications











 Medication  Instructions  Recorded  Confirmed


 


Atorvastatin [Lipitor] 40 mg PO HS 21


 


Dulaglutide [Trulicity] 3 mg SQ WE 21


 


Calcium Acetate 667 mg PO AS DIRECTED 22


 


Sevelamer [Renvela] 1,600 mg PO BID 10/07/22 04/23/23


 


Zonisamide [Zonegran] 100 mg PO TID 23


 


levETIRAcetam [Keppra] 500 mg PO BID@0900,23


 


levETIRAcetam [Keppra] 500 mg PO MOWEFR@1400 23


 


Divalproex Sodium [Depakote] 500 mg PO BID@0900,23


 


Divalproex Sodium [Depakote] 500 mg PO MOWEFR@1400 23


 


icosapent ethyL [Vascepa] 1 gm PO BID 23


 


Metoprolol Tartrate [Lopressor] 25 mg PO AS DIRECTED 23


 


Renal-Kathie 1 cap PO DAILY 23








                                  Previous Rx's











 Medication  Instructions  Recorded


 


Famotidine [Pepcid] 20 mg PO DAILY #30 tab 03/10/23


 


Lacosamide [Vimpat] 150 mg PO BID@0900, #6 tab 03/10/23


 


Lacosamide [Vimpat] 150 mg PO MOWEFR@1400 #2 tab 03/10/23


 


Ciprofloxacin HCl [Cipro] 500 mg PO BID 5 Days #10 tab 23











                                    Allergies











Allergy/AdvReac Type Severity Reaction Status Date / Time


 


Penicillins Allergy  Itching Verified 23 20:45














Review of Systems


ROS Statement: 


Those systems with pertinent positive or pertinent negative responses have been 

documented in the HPI.





ROS Other: All systems not noted in ROS Statement are negative.





Past Medical History


Past Medical History: Chest Pain / Angina, CVA/TIA, Diabetes Mellitus, 

GERD/Reflux, Hyperlipidemia, Hypertension, Memory Impairment, Osteoarthritis 

(OA), Renal Disease, Seizure Disorder, Thyroid Disorder


Additional Past Medical History / Comment(s): Last seizure approximately one 

month ago. Spouse states she used to have seizures 4X per week until she had 

vagal nerve stimulator placed, now has seizures approximately once a week to 

once a month. Dialysis MOWEFR. Neuropathy, left drop foot, only able to walk 

short distances, otherwise uses wheelchair. 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 

ago, with residual memory impairment. Never diagnosed with Sleep Apnea but 

spouse states she does stop breathing through the night and he has to shake her 

to start breathing again. Varicose veins.


History of Any Multi-Drug Resistant Organisms: None Reported


Past Surgical History:  Section, Tonsillectomy, Uterine Ablation


Additional Past Surgical History / Comment(s): Left arm fistula, loop recorder, 

vagus nerve stimulator.


Past Anesthesia/Blood Transfusion Reactions: Motion Sickness


Additional Past Anesthesia/Blood Transfusion Reaction / Comment(s): Family hx 

unknown, pt adopted.


Past Psychological History: Bipolar


Smoking Status: Former smoker


Past Alcohol Use History: None Reported


Past Drug Use History: None Reported





- Past Family History


  ** Father


Family Medical History: Unable to Obtain


Additional Family Medical History / Comment(s): Pt adopted.





General Exam


Limitations: altered mental status


General appearance: alert, in no apparent distress


Head exam: Present: atraumatic, normocephalic, normal inspection


Eye exam: Present: normal appearance, PERRL.  Absent: periorbital swelling


Neck exam: Present: normal inspection


Respiratory exam: Present: normal lung sounds bilaterally.  Absent: respiratory 

distress, wheezes, rales, rhonchi, stridor


Cardiovascular Exam: Present: regular rate, normal rhythm, normal heart sounds. 

Absent: systolic murmur, diastolic murmur, rubs, gallop, clicks


Neurological exam: Present: alert, altered


Psychiatric exam: Present: flat affect


Skin exam: Present: warm, dry, intact, normal color.  Absent: rash





Course


                                   Vital Signs











  23





  19:25 21:51 00:15


 


Temperature 98.4 F  


 


Pulse Rate 94 86 87


 


Respiratory 18 16 20





Rate   


 


Blood Pressure 151/77 144/76 156/84


 


O2 Sat by Pulse 98 96 96





Oximetry   














  23





  03:01


 


Temperature 


 


Pulse Rate 83


 


Respiratory 16





Rate 


 


Blood Pressure 148/78


 


O2 Sat by Pulse 98





Oximetry 














Medical Decision Making





- Medical Decision Making


Was pt. sent in by a medical professional or institution (, PA, NP, urgent 

care, hospital, or nursing home...) When possible be specific


@  -Nursing home


Did you speak to anyone other than the patient for history (EMS, parent, family,

police, friend...)? What history was obtained from this source 


@  -Vague history obtained from nursing staff who spoke to EMS


Did you review nursing and triage notes (agree or disagree)?  Why? 


@  -I reviewed and agree with nursing and triage notes


Were old charts reviewed (outside hosp., previous admission, EMS record, old 

EKG, old radiological studies, urgent care reports/EKG's, nursing home records)?

Report findings 


@  -Previous admissions reviewed


Differential Diagnosis (chest pain, altered mental status, abdominal pain women,

abdominal pain men, vaginal bleeding, weakness, fever, dyspnea, syncope, 

headache, dizziness, GI bleed, back pain, seizure, CVA, palpatations, mental 

health, musculoskeletal)? 


@  -MDM Differential Altered Mental Status:


Hypoglycemia, DKA, hypercapnia, ETOH, overdose, CO poisoning, trauma, myxedema 

coma, HTN encephalopathy, infection, encephalitis, psychosis, intercranial 

hemorrhage, hepatic encephalopathy, meningitis, CVA this is not meant to be 

an all-inclusive list


EKG interpreted by me (3pts min.).


@  -Sinus rhythm ventricular rate 97.  PA interval 164.  QRS 92.  .  QTC 

408


X-rays interpreted by me (1pt min.).


@  -Chest x-ray shows no acute process.


CT interpreted by me (1pt min.).


@  -CT of the brain shows no acute intracranial process


U/S interpreted by me (1pt. min.).


@  -None done


What testing was considered but not performed or refused? (CT, X-rays, U/S, 

labs)? Why?


@  -None


What meds were considered but not given or refused? Why?


@  -None


Did you discuss the management of the patient with other professionals 

(professionals i.e. , PA, NP, lab, RT, psych nurse, , , t

eacher, , )? Give summary


@  -No


Was smoking cessation discussed for >3mins.?


@  -No


Was critical care preformed (if so, how long)?


@  -No


Were there social determinants of health that impacted care today? How? 

(Homelessness, low income, unemployed, alcoholism, drug addiction, 

transportation, low edu. Level, literacy, decrease access to med. care, detention, 

rehab)?


@  -No


Was there de-escalation of care discussed even if they declined (Discuss DNR or 

withdrawal of care, Hospice)? DNR status


@  -No


What co-morbidities impacted this encounter? (DM, HTN, Smoking, COPD, CAD, 

Cancer, CVA, ARF, Chemo, Hep., AIDS, mental health diagnosis, sleep apnea, 

morbid obesity)?


@  -Diabetes, hypertension, hyperlipidemia, dementia, renal disease


Was patient admitted / discharged? Hospital course, mention meds given and 

route, prescriptions, significant lab abnormalities, going to OR and other 

pertinent info.


@  -Patient is a 54-year-old female presenting with chief complaint of altered 

mental status from nursing home.  On physical examination patient is following 

commands, able to have short conversations.  Creatinine 7.62 and BUN 64, patient

currently receives dialysis.  Troponin is negative EKG shows no acute changes.  

CT of the brain and chest x-ray showed no acute process.  Urine shows evidence 

for UTI, she is given a dose of ceftriaxone.  I believe it reasonable to 

discharge her back to the nursing home with prescription for Cipro for her UTI.

Follow-up with PCP.  Report back to ER with any new or worsening symptoms.  

Discussed return parameters and answered all questions.  Patient conveyed verbal

understanding and agreed to the plan.  I discussed this case in detail with my 

attending Dr. Mauro


Undiagnosed new problem with uncertain prognosis?


@  -No


Drug Therapy requiring intensive monitoring for toxicity (Heparin, Nitro, 

Insulin, Cardizem)?


@  -No


Were any procedures done?


@  -No


Diagnosis/symptom?


@  -UTI


Acute, or Chronic, or Acute on Chronic?


@  -Acute


Uncomplicated (without systemic symptoms) or Complicated (systemic symptoms)?


@  -Complicated


Side effects of treatment?


@  -No


Exacerbation, Progression, or Severe Exacerbation?


@  -No


Poses a threat to life or bodily function? How? (Chest pain, USA, MI, pneumonia,

PE, COPD, DKA, ARF, appy, cholecystitis, CVA, Diverticulitis, Homicidal, 

Suicidal, threat to staff... and all critical care pts)


@  -No








- Lab Data


Result diagrams: 


                                 23 20:18





                                 23 20:18


                                   Lab Results











  23 Range/Units





  20:18 20:18 20:18 


 


WBC  7.3    (3.8-10.6)  k/uL


 


RBC  3.43 L    (3.80-5.40)  m/uL


 


Hgb  11.3 L    (11.4-16.0)  gm/dL


 


Hct  34.7    (34.0-46.0)  %


 


MCV  101.3 H    (80.0-100.0)  fL


 


MCH  33.1    (25.0-35.0)  pg


 


MCHC  32.7    (31.0-37.0)  g/dL


 


RDW  13.8    (11.5-15.5)  %


 


Plt Count  198  D    (150-450)  k/uL


 


MPV  8.3    


 


Neutrophils %  63    %


 


Lymphocytes %  28    %


 


Monocytes %  7    %


 


Eosinophils %  1    %


 


Basophils %  1    %


 


Neutrophils #  4.6    (1.3-7.7)  k/uL


 


Lymphocytes #  2.0    (1.0-4.8)  k/uL


 


Monocytes #  0.5    (0-1.0)  k/uL


 


Eosinophils #  0.1    (0-0.7)  k/uL


 


Basophils #  0.0    (0-0.2)  k/uL


 


Macrocytosis  Slight    


 


PT   11.0   (9.0-12.0)  sec


 


INR   1.1   (<1.2)  


 


APTT   24.0   (22.0-30.0)  sec


 


Sodium    137  (137-145)  mmol/L


 


Potassium    5.3 H  (3.5-5.1)  mmol/L


 


Chloride    97 L  ()  mmol/L


 


Carbon Dioxide    28  (22-30)  mmol/L


 


Anion Gap    12  mmol/L


 


BUN    64 H  (7-17)  mg/dL


 


Creatinine    7.62 H*  (0.52-1.04)  mg/dL


 


Est GFR (CKD-EPI)AfAm    6  (>60 ml/min/1.73 sqM)  


 


Est GFR (CKD-EPI)NonAf    5  (>60 ml/min/1.73 sqM)  


 


Glucose    213 H  (74-99)  mg/dL


 


Calcium    9.3  (8.4-10.2)  mg/dL


 


Total Bilirubin    0.5  (0.2-1.3)  mg/dL


 


AST    50 H  (14-36)  U/L


 


ALT    32  (4-34)  U/L


 


Alkaline Phosphatase    109  ()  U/L


 


Ammonia     (<30)  umol/L


 


Troponin I     (0.000-0.034)  ng/mL


 


Total Protein    6.6  (6.3-8.2)  g/dL


 


Albumin    3.7  (3.5-5.0)  g/dL


 


Urine Color     


 


Urine Appearance     (Clear)  


 


Urine pH     (5.0-8.0)  


 


Ur Specific Gravity     (1.001-1.035)  


 


Urine Protein     (Negative)  


 


Urine Glucose (UA)     (Negative)  


 


Urine Ketones     (Negative)  


 


Urine Blood     (Negative)  


 


Urine Nitrite     (Negative)  


 


Urine Bilirubin     (Negative)  


 


Urine Urobilinogen     (<2.0)  mg/dL


 


Ur Leukocyte Esterase     (Negative)  


 


Urine RBC     (0-5)  /hpf


 


Urine WBC     (0-5)  /hpf


 


Ur Squamous Epith Cells     (0-4)  /hpf


 


Hyaline Casts     (0-2)  /lpf


 


Urine Opiates Screen     (NotDetected)  


 


Ur Oxycodone Screen     (NotDetected)  


 


Urine Methadone Screen     (NotDetected)  


 


Ur Propoxyphene Screen     (NotDetected)  


 


Ur Barbiturates Screen     (NotDetected)  


 


U Tricyclic Antidepress     (NotDetected)  


 


Ur Phencyclidine Scrn     (NotDetected)  


 


Ur Amphetamines Screen     (NotDetected)  


 


U Methamphetamines Scrn     (NotDetected)  


 


U Benzodiazepines Scrn     (NotDetected)  


 


Urine Cocaine Screen     (NotDetected)  


 


U Marijuana (THC) Screen     (NotDetected)  














  23 Range/Units





  20:18 20:18 01:30 


 


WBC     (3.8-10.6)  k/uL


 


RBC     (3.80-5.40)  m/uL


 


Hgb     (11.4-16.0)  gm/dL


 


Hct     (34.0-46.0)  %


 


MCV     (80.0-100.0)  fL


 


MCH     (25.0-35.0)  pg


 


MCHC     (31.0-37.0)  g/dL


 


RDW     (11.5-15.5)  %


 


Plt Count     (150-450)  k/uL


 


MPV     


 


Neutrophils %     %


 


Lymphocytes %     %


 


Monocytes %     %


 


Eosinophils %     %


 


Basophils %     %


 


Neutrophils #     (1.3-7.7)  k/uL


 


Lymphocytes #     (1.0-4.8)  k/uL


 


Monocytes #     (0-1.0)  k/uL


 


Eosinophils #     (0-0.7)  k/uL


 


Basophils #     (0-0.2)  k/uL


 


Macrocytosis     


 


PT     (9.0-12.0)  sec


 


INR     (<1.2)  


 


APTT     (22.0-30.0)  sec


 


Sodium     (137-145)  mmol/L


 


Potassium     (3.5-5.1)  mmol/L


 


Chloride     ()  mmol/L


 


Carbon Dioxide     (22-30)  mmol/L


 


Anion Gap     mmol/L


 


BUN     (7-17)  mg/dL


 


Creatinine     (0.52-1.04)  mg/dL


 


Est GFR (CKD-EPI)AfAm     (>60 ml/min/1.73 sqM)  


 


Est GFR (CKD-EPI)NonAf     (>60 ml/min/1.73 sqM)  


 


Glucose     (74-99)  mg/dL


 


Calcium     (8.4-10.2)  mg/dL


 


Total Bilirubin     (0.2-1.3)  mg/dL


 


AST     (14-36)  U/L


 


ALT     (4-34)  U/L


 


Alkaline Phosphatase     ()  U/L


 


Ammonia  <9    (<30)  umol/L


 


Troponin I   <0.012   (0.000-0.034)  ng/mL


 


Total Protein     (6.3-8.2)  g/dL


 


Albumin     (3.5-5.0)  g/dL


 


Urine Color    Light Yellow  


 


Urine Appearance    Clear  (Clear)  


 


Urine pH    8.0  (5.0-8.0)  


 


Ur Specific Gravity    1.009  (1.001-1.035)  


 


Urine Protein    1+ H  (Negative)  


 


Urine Glucose (UA)    Trace H  (Negative)  


 


Urine Ketones    Negative  (Negative)  


 


Urine Blood    Trace H  (Negative)  


 


Urine Nitrite    Negative  (Negative)  


 


Urine Bilirubin    Negative  (Negative)  


 


Urine Urobilinogen    <2.0  (<2.0)  mg/dL


 


Ur Leukocyte Esterase    Large H  (Negative)  


 


Urine RBC    13 H  (0-5)  /hpf


 


Urine WBC    136 H  (0-5)  /hpf


 


Ur Squamous Epith Cells    1  (0-4)  /hpf


 


Hyaline Casts    1  (0-2)  /lpf


 


Urine Opiates Screen    Not Detected  (NotDetected)  


 


Ur Oxycodone Screen    Not Detected  (NotDetected)  


 


Urine Methadone Screen    Not Detected  (NotDetected)  


 


Ur Propoxyphene Screen    Not Detected  (NotDetected)  


 


Ur Barbiturates Screen    Not Detected  (NotDetected)  


 


U Tricyclic Antidepress    Not Detected  (NotDetected)  


 


Ur Phencyclidine Scrn    Not Detected  (NotDetected)  


 


Ur Amphetamines Screen    Not Detected  (NotDetected)  


 


U Methamphetamines Scrn    Not Detected  (NotDetected)  


 


U Benzodiazepines Scrn    Not Detected  (NotDetected)  


 


Urine Cocaine Screen    Not Detected  (NotDetected)  


 


U Marijuana (THC) Screen    Not Detected  (NotDetected)  














Disposition


Clinical Impression: 


 UTI (urinary tract infection)





Disposition: HOME SELF-CARE


Condition: Good


Instructions (If sedation given, give patient instructions):  Urinary Tract 

Infection in Women (ED), Altered Mental Status (ED)


Additional Instructions: 


Follow-up with PCP.  Report back to ER with any new or worsening symptoms.  Take

medication as prescribed.


Prescriptions: 


Ciprofloxacin HCl [Cipro] 500 mg PO BID 5 Days #10 tab


Is patient prescribed a controlled substance at d/c from ED?: No


Referrals: 


Dharmesh Roblero MD [Primary Care Provider] - 1-2 days


Time of Disposition: 02:25  used

## 2023-07-09 ENCOUNTER — APPOINTMENT (OUTPATIENT)
Dept: ELECTROPHYSIOLOGY | Facility: CLINIC | Age: 88
End: 2023-07-09
Payer: MEDICARE

## 2023-07-10 ENCOUNTER — NON-APPOINTMENT (OUTPATIENT)
Age: 88
End: 2023-07-10

## 2023-07-10 PROCEDURE — 93294 REM INTERROG EVL PM/LDLS PM: CPT

## 2023-07-10 PROCEDURE — 93296 REM INTERROG EVL PM/IDS: CPT

## 2023-09-27 NOTE — ASSESSMENT
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 %.\par Asymptomatic- amaurosis fugax ?- per patient not new.\par \par S/p two cycles of bortezomib/ dex without improvement in IgM levels.\par \par Discussed options : add cytoxan ( CyborD) or change therapy.\par \par Plan : add cytoxan weekly ( CyBorD) for 2-3 cycles until IgM < 400 mg- will change to Velcade/ Dex/ Rituxan at that point.\par \par \par patient reluctant to do any changes but she might agree- will discuss with her family.\par \par She will increase dex dose back to 40 mg weekly for next two cycles\par \par \par Long discussion  with the patient and her daughter Mary Kay.\par \par \par \par  No

## 2023-10-08 ENCOUNTER — APPOINTMENT (OUTPATIENT)
Dept: ELECTROPHYSIOLOGY | Facility: CLINIC | Age: 88
End: 2023-10-08
Payer: MEDICARE

## 2023-10-09 ENCOUNTER — NON-APPOINTMENT (OUTPATIENT)
Age: 88
End: 2023-10-09

## 2023-10-09 PROCEDURE — 93296 REM INTERROG EVL PM/IDS: CPT

## 2023-10-09 PROCEDURE — 93294 REM INTERROG EVL PM/LDLS PM: CPT

## 2023-11-16 NOTE — PROVIDER CONTACT NOTE (CRITICAL VALUE NOTIFICATION) - TEST AND RESULT REPORTED:
Sodium 120
Na 120
Cephalexin Counseling: I counseled the patient regarding use of cephalexin as an antibiotic for prophylactic and/or therapeutic purposes. Cephalexin (commonly prescribed under brand name Keflex) is a cephalosporin antibiotic which is active against numerous classes of bacteria, including most skin bacteria. Side effects may include nausea, diarrhea, gastrointestinal upset, rash, hives, yeast infections, and in rare cases, hepatitis, kidney disease, seizures, fever, confusion, neurologic symptoms, and others. Patients with severe allergies to penicillin medications are cautioned that there is about a 10% incidence of cross-reactivity with cephalosporins. When possible, patients with penicillin allergies should use alternatives to cephalosporins for antibiotic therapy.

## 2024-01-03 ENCOUNTER — APPOINTMENT (OUTPATIENT)
Dept: ELECTROPHYSIOLOGY | Facility: CLINIC | Age: 89
End: 2024-01-03

## 2024-01-03 ENCOUNTER — APPOINTMENT (OUTPATIENT)
Dept: ELECTROPHYSIOLOGY | Facility: CLINIC | Age: 89
End: 2024-01-03
Payer: MEDICARE

## 2024-01-04 ENCOUNTER — NON-APPOINTMENT (OUTPATIENT)
Age: 89
End: 2024-01-04

## 2024-01-04 PROCEDURE — 93296 REM INTERROG EVL PM/IDS: CPT

## 2024-01-04 PROCEDURE — 93294 REM INTERROG EVL PM/LDLS PM: CPT

## 2024-04-01 ENCOUNTER — APPOINTMENT (OUTPATIENT)
Dept: ELECTROPHYSIOLOGY | Facility: CLINIC | Age: 89
End: 2024-04-01
Payer: MEDICARE

## 2024-04-01 VITALS
DIASTOLIC BLOOD PRESSURE: 60 MMHG | HEIGHT: 62 IN | BODY MASS INDEX: 25.76 KG/M2 | WEIGHT: 140 LBS | SYSTOLIC BLOOD PRESSURE: 114 MMHG | OXYGEN SATURATION: 97 % | HEART RATE: 65 BPM | RESPIRATION RATE: 14 BRPM

## 2024-04-01 DIAGNOSIS — I44.2 ATRIOVENTRICULAR BLOCK, COMPLETE: ICD-10-CM

## 2024-04-01 DIAGNOSIS — Z95.0 PRESENCE OF CARDIAC PACEMAKER: ICD-10-CM

## 2024-04-01 PROCEDURE — 93280 PM DEVICE PROGR EVAL DUAL: CPT

## 2024-04-01 RX ORDER — DENOSUMAB 60 MG/ML
60 INJECTION SUBCUTANEOUS
Refills: 0 | Status: ACTIVE | COMMUNITY

## 2024-04-01 RX ORDER — IRON/IRON ASP GLY/FA/MV-MIN 38 125-25-1MG
TABLET ORAL DAILY
Refills: 0 | Status: ACTIVE | COMMUNITY

## 2024-04-01 RX ORDER — SACUBITRIL AND VALSARTAN 24; 26 MG/1; MG/1
24-26 TABLET, FILM COATED ORAL TWICE DAILY
Refills: 0 | Status: ACTIVE | COMMUNITY

## 2024-04-01 RX ORDER — APIXABAN 2.5 MG/1
2.5 TABLET, FILM COATED ORAL
Refills: 0 | Status: ACTIVE | COMMUNITY

## 2024-04-01 RX ORDER — ALBUTEROL SULFATE 90 UG/1
AEROSOL, METERED RESPIRATORY (INHALATION)
Refills: 0 | Status: DISCONTINUED | COMMUNITY
End: 2024-04-01

## 2024-04-01 RX ORDER — LEVOTHYROXINE SODIUM 100 UG/1
100 TABLET ORAL
Refills: 0 | Status: DISCONTINUED | COMMUNITY
End: 2024-04-01

## 2024-04-01 RX ORDER — LEVOTHYROXINE SODIUM 0.1 MG/1
100 TABLET ORAL DAILY
Refills: 0 | Status: ACTIVE | COMMUNITY

## 2024-04-01 RX ORDER — ACYCLOVIR 400 MG/1
400 TABLET ORAL TWICE DAILY
Qty: 180 | Refills: 3 | Status: DISCONTINUED | COMMUNITY
Start: 2021-01-04 | End: 2024-04-01

## 2024-04-01 RX ORDER — ALPRAZOLAM 0.25 MG/1
0.25 TABLET ORAL 3 TIMES DAILY
Refills: 0 | Status: DISCONTINUED | COMMUNITY
Start: 2018-11-02 | End: 2024-04-01

## 2024-04-01 RX ORDER — DOXYCYCLINE HYCLATE 100 MG/1
100 CAPSULE ORAL
Qty: 14 | Refills: 0 | Status: DISCONTINUED | COMMUNITY
Start: 2022-09-14 | End: 2024-04-01

## 2024-04-01 RX ORDER — ASPIRIN 81 MG
600-200 TABLET, DELAYED RELEASE (ENTERIC COATED) ORAL DAILY
Refills: 0 | Status: DISCONTINUED | COMMUNITY
Start: 2019-05-03 | End: 2024-04-01

## 2024-04-01 RX ORDER — SILVER SULFADIAZINE 10 MG/G
1 CREAM TOPICAL TWICE DAILY
Qty: 1 | Refills: 0 | Status: DISCONTINUED | COMMUNITY
Start: 2022-09-28 | End: 2024-04-01

## 2024-04-01 NOTE — ASSESSMENT
[FreeTextEntry1] : The patient presented for routine pacemaker follow-up. There are no complaints referable to the pacemaker. The pacemaker was interrogated and found to be functioning normally. Pt has been having increased events of PAF.  If sxs continue will consider  increasing Coreg from 3.125 mgs bid to 6.25 mgs daily.

## 2024-06-30 ENCOUNTER — APPOINTMENT (OUTPATIENT)
Dept: ELECTROPHYSIOLOGY | Facility: CLINIC | Age: 89
End: 2024-06-30
Payer: MEDICARE

## 2024-06-30 ENCOUNTER — NON-APPOINTMENT (OUTPATIENT)
Age: 89
End: 2024-06-30

## 2024-07-01 PROCEDURE — 93294 REM INTERROG EVL PM/LDLS PM: CPT

## 2024-07-01 PROCEDURE — 93296 REM INTERROG EVL PM/IDS: CPT

## 2024-08-07 ENCOUNTER — NON-APPOINTMENT (OUTPATIENT)
Age: 89
End: 2024-08-07

## 2024-09-29 ENCOUNTER — APPOINTMENT (OUTPATIENT)
Dept: ELECTROPHYSIOLOGY | Facility: CLINIC | Age: 89
End: 2024-09-29

## 2024-09-30 ENCOUNTER — NON-APPOINTMENT (OUTPATIENT)
Age: 89
End: 2024-09-30

## 2024-09-30 PROCEDURE — 93294 REM INTERROG EVL PM/LDLS PM: CPT

## 2024-09-30 PROCEDURE — 93296 REM INTERROG EVL PM/IDS: CPT

## 2024-10-17 NOTE — H&P ADULT - NSCORESITESY/N_GEN_A_CORE_RD
Yes
Apixaban/Eliquis - Compliance/Apixaban/Eliquis - Dietary Advice/Apixaban/Eliquis - Follow up monitoring/Apixaban/Eliquis - Potential for adverse drug reactions and interactions

## 2024-11-30 ENCOUNTER — NON-APPOINTMENT (OUTPATIENT)
Age: 89
End: 2024-11-30

## 2024-12-20 ENCOUNTER — APPOINTMENT (OUTPATIENT)
Dept: ORTHOPEDIC SURGERY | Facility: CLINIC | Age: 88
End: 2024-12-20
Payer: MEDICARE

## 2024-12-20 VITALS — HEIGHT: 62 IN | WEIGHT: 140 LBS | BODY MASS INDEX: 25.76 KG/M2

## 2024-12-20 DIAGNOSIS — S32.020A WEDGE COMPRESSION FRACTURE OF SECOND LUMBAR VERTEBRA, INITIAL ENCOUNTER FOR CLOSED FRACTURE: ICD-10-CM

## 2024-12-20 PROCEDURE — 99204 OFFICE O/P NEW MOD 45 MIN: CPT

## 2024-12-27 ENCOUNTER — RESULT REVIEW (OUTPATIENT)
Age: 88
End: 2024-12-27

## 2024-12-30 ENCOUNTER — NON-APPOINTMENT (OUTPATIENT)
Age: 88
End: 2024-12-30

## 2024-12-30 ENCOUNTER — APPOINTMENT (OUTPATIENT)
Dept: ELECTROPHYSIOLOGY | Facility: CLINIC | Age: 88
End: 2024-12-30
Payer: MEDICARE

## 2024-12-30 PROCEDURE — 93296 REM INTERROG EVL PM/IDS: CPT

## 2024-12-30 PROCEDURE — 93294 REM INTERROG EVL PM/LDLS PM: CPT

## 2025-01-03 ENCOUNTER — NON-APPOINTMENT (OUTPATIENT)
Age: 89
End: 2025-01-03

## 2025-01-27 ENCOUNTER — APPOINTMENT (OUTPATIENT)
Dept: ORTHOPEDIC SURGERY | Facility: CLINIC | Age: 89
End: 2025-01-27
Payer: MEDICARE

## 2025-01-27 VITALS — BODY MASS INDEX: 25.76 KG/M2 | WEIGHT: 140 LBS | HEIGHT: 62 IN

## 2025-01-27 PROCEDURE — 72100 X-RAY EXAM L-S SPINE 2/3 VWS: CPT

## 2025-01-27 PROCEDURE — 99213 OFFICE O/P EST LOW 20 MIN: CPT

## 2025-01-27 PROCEDURE — 72070 X-RAY EXAM THORAC SPINE 2VWS: CPT

## 2025-01-31 ENCOUNTER — RESULT REVIEW (OUTPATIENT)
Age: 89
End: 2025-01-31

## 2025-02-07 ENCOUNTER — APPOINTMENT (OUTPATIENT)
Dept: ORTHOPEDIC SURGERY | Facility: CLINIC | Age: 89
End: 2025-02-07
Payer: MEDICARE

## 2025-02-07 VITALS — BODY MASS INDEX: 25.76 KG/M2 | HEIGHT: 62 IN | WEIGHT: 140 LBS

## 2025-02-07 DIAGNOSIS — S22.080D WEDGE COMPRESSION FRACTURE OF T11-T12 VERTEBRA, SUBSEQUENT ENCOUNTER FOR FRACTURE WITH ROUTINE HEALING: ICD-10-CM

## 2025-02-07 DIAGNOSIS — S22.080A WEDGE COMPRESSION FRACTURE OF T11-T12 VERTEBRA, INITIAL ENCOUNTER FOR CLOSED FRACTURE: ICD-10-CM

## 2025-02-07 PROCEDURE — 99213 OFFICE O/P EST LOW 20 MIN: CPT

## 2025-02-26 ENCOUNTER — APPOINTMENT (OUTPATIENT)
Dept: ORTHOPEDIC SURGERY | Facility: CLINIC | Age: 89
End: 2025-02-26

## 2025-02-27 ENCOUNTER — INPATIENT (INPATIENT)
Facility: HOSPITAL | Age: 89
LOS: 4 days | Discharge: NURSING, REHAB OR LONG-TERM CARE | DRG: 536 | End: 2025-03-04
Attending: SURGERY | Admitting: SURGERY
Payer: MEDICARE

## 2025-02-27 VITALS
TEMPERATURE: 99 F | RESPIRATION RATE: 16 BRPM | OXYGEN SATURATION: 96 % | HEART RATE: 64 BPM | SYSTOLIC BLOOD PRESSURE: 103 MMHG | DIASTOLIC BLOOD PRESSURE: 52 MMHG

## 2025-02-27 DIAGNOSIS — Z90.710 ACQUIRED ABSENCE OF BOTH CERVIX AND UTERUS: Chronic | ICD-10-CM

## 2025-02-27 DIAGNOSIS — S32.599A OTHER SPECIFIED FRACTURE OF UNSPECIFIED PUBIS, INITIAL ENCOUNTER FOR CLOSED FRACTURE: ICD-10-CM

## 2025-02-27 LAB
ADD ON TEST-SPECIMEN IN LAB: SIGNIFICANT CHANGE UP
ALBUMIN SERPL ELPH-MCNC: 3.2 G/DL — LOW (ref 3.3–5)
ALP SERPL-CCNC: 65 U/L — SIGNIFICANT CHANGE UP (ref 40–120)
ALT FLD-CCNC: 27 U/L — SIGNIFICANT CHANGE UP (ref 12–78)
ANION GAP SERPL CALC-SCNC: 4 MMOL/L — LOW (ref 5–17)
AST SERPL-CCNC: 35 U/L — SIGNIFICANT CHANGE UP (ref 15–37)
BASOPHILS # BLD AUTO: 0.02 K/UL — SIGNIFICANT CHANGE UP (ref 0–0.2)
BASOPHILS NFR BLD AUTO: 0.2 % — SIGNIFICANT CHANGE UP (ref 0–2)
BILIRUB SERPL-MCNC: 1 MG/DL — SIGNIFICANT CHANGE UP (ref 0.2–1.2)
BUN SERPL-MCNC: 74 MG/DL — HIGH (ref 7–23)
CALCIUM SERPL-MCNC: 10 MG/DL — SIGNIFICANT CHANGE UP (ref 8.5–10.1)
CHLORIDE SERPL-SCNC: 110 MMOL/L — HIGH (ref 96–108)
CK SERPL-CCNC: 117 U/L — SIGNIFICANT CHANGE UP (ref 26–192)
CO2 SERPL-SCNC: 23 MMOL/L — SIGNIFICANT CHANGE UP (ref 22–31)
CREAT SERPL-MCNC: 1.51 MG/DL — HIGH (ref 0.5–1.3)
EGFR: 33 ML/MIN/1.73M2 — LOW
EGFR: 33 ML/MIN/1.73M2 — LOW
EOSINOPHIL # BLD AUTO: 0.06 K/UL — SIGNIFICANT CHANGE UP (ref 0–0.5)
EOSINOPHIL NFR BLD AUTO: 0.6 % — SIGNIFICANT CHANGE UP (ref 0–6)
FLUAV AG NPH QL: SIGNIFICANT CHANGE UP
FLUBV AG NPH QL: SIGNIFICANT CHANGE UP
GLUCOSE SERPL-MCNC: 173 MG/DL — HIGH (ref 70–99)
HCT VFR BLD CALC: 32.1 % — LOW (ref 34.5–45)
HGB BLD-MCNC: 10.2 G/DL — LOW (ref 11.5–15.5)
IMM GRANULOCYTES # BLD AUTO: 0.03 K/UL — SIGNIFICANT CHANGE UP (ref 0–0.07)
IMM GRANULOCYTES NFR BLD AUTO: 0.3 % — SIGNIFICANT CHANGE UP (ref 0–0.9)
LACTATE SERPL-SCNC: 1.5 MMOL/L — SIGNIFICANT CHANGE UP (ref 0.7–2)
LYMPHOCYTES # BLD AUTO: 1.71 K/UL — SIGNIFICANT CHANGE UP (ref 1–3.3)
LYMPHOCYTES NFR BLD AUTO: 16.3 % — SIGNIFICANT CHANGE UP (ref 13–44)
MAGNESIUM SERPL-MCNC: 2.7 MG/DL — HIGH (ref 1.6–2.6)
MCHC RBC-ENTMCNC: 31.8 G/DL — LOW (ref 32–36)
MCHC RBC-ENTMCNC: 31.9 PG — SIGNIFICANT CHANGE UP (ref 27–34)
MCV RBC AUTO: 100.3 FL — HIGH (ref 80–100)
MONOCYTES # BLD AUTO: 1.41 K/UL — HIGH (ref 0–0.9)
MONOCYTES NFR BLD AUTO: 13.4 % — SIGNIFICANT CHANGE UP (ref 2–14)
NEUTROPHILS # BLD AUTO: 7.28 K/UL — SIGNIFICANT CHANGE UP (ref 1.8–7.4)
NEUTROPHILS NFR BLD AUTO: 69.2 % — SIGNIFICANT CHANGE UP (ref 43–77)
NRBC # BLD AUTO: 0 K/UL — SIGNIFICANT CHANGE UP (ref 0–0)
NRBC # FLD: 0 K/UL — SIGNIFICANT CHANGE UP (ref 0–0)
NRBC BLD AUTO-RTO: 0 /100 WBCS — SIGNIFICANT CHANGE UP (ref 0–0)
PLATELET # BLD AUTO: 194 K/UL — SIGNIFICANT CHANGE UP (ref 150–400)
PMV BLD: 11.9 FL — SIGNIFICANT CHANGE UP (ref 7–13)
POTASSIUM SERPL-MCNC: 5 MMOL/L — SIGNIFICANT CHANGE UP (ref 3.5–5.3)
POTASSIUM SERPL-SCNC: 5 MMOL/L — SIGNIFICANT CHANGE UP (ref 3.5–5.3)
PROT SERPL-MCNC: 7 GM/DL — SIGNIFICANT CHANGE UP (ref 6–8.3)
RBC # BLD: 3.2 M/UL — LOW (ref 3.8–5.2)
RBC # FLD: 15.9 % — HIGH (ref 10.3–14.5)
RSV RNA NPH QL NAA+NON-PROBE: SIGNIFICANT CHANGE UP
SARS-COV-2 RNA SPEC QL NAA+PROBE: SIGNIFICANT CHANGE UP
SODIUM SERPL-SCNC: 137 MMOL/L — SIGNIFICANT CHANGE UP (ref 135–145)
TROPONIN I, HIGH SENSITIVITY RESULT: 14.16 NG/L — SIGNIFICANT CHANGE UP
WBC # BLD: 10.51 K/UL — HIGH (ref 3.8–10.5)
WBC # FLD AUTO: 10.51 K/UL — HIGH (ref 3.8–10.5)

## 2025-02-27 PROCEDURE — 83880 ASSAY OF NATRIURETIC PEPTIDE: CPT

## 2025-02-27 PROCEDURE — 74176 CT ABD & PELVIS W/O CONTRAST: CPT | Mod: 26

## 2025-02-27 PROCEDURE — 73503 X-RAY EXAM HIP UNI 4/> VIEWS: CPT | Mod: 26,LT

## 2025-02-27 PROCEDURE — 93010 ELECTROCARDIOGRAM REPORT: CPT

## 2025-02-27 PROCEDURE — 83735 ASSAY OF MAGNESIUM: CPT

## 2025-02-27 PROCEDURE — 97530 THERAPEUTIC ACTIVITIES: CPT | Mod: GP

## 2025-02-27 PROCEDURE — 84100 ASSAY OF PHOSPHORUS: CPT

## 2025-02-27 PROCEDURE — 73552 X-RAY EXAM OF FEMUR 2/>: CPT | Mod: 26,LT

## 2025-02-27 PROCEDURE — 93005 ELECTROCARDIOGRAM TRACING: CPT

## 2025-02-27 PROCEDURE — 97110 THERAPEUTIC EXERCISES: CPT | Mod: GP

## 2025-02-27 PROCEDURE — 73522 X-RAY EXAM HIPS BI 3-4 VIEWS: CPT

## 2025-02-27 PROCEDURE — 85027 COMPLETE CBC AUTOMATED: CPT

## 2025-02-27 PROCEDURE — 97116 GAIT TRAINING THERAPY: CPT | Mod: GP

## 2025-02-27 PROCEDURE — 99221 1ST HOSP IP/OBS SF/LOW 40: CPT

## 2025-02-27 PROCEDURE — 99222 1ST HOSP IP/OBS MODERATE 55: CPT

## 2025-02-27 PROCEDURE — 99285 EMERGENCY DEPT VISIT HI MDM: CPT

## 2025-02-27 PROCEDURE — 80048 BASIC METABOLIC PNL TOTAL CA: CPT

## 2025-02-27 PROCEDURE — 71045 X-RAY EXAM CHEST 1 VIEW: CPT | Mod: 26

## 2025-02-27 PROCEDURE — 36415 COLL VENOUS BLD VENIPUNCTURE: CPT

## 2025-02-27 PROCEDURE — 73522 X-RAY EXAM HIPS BI 3-4 VIEWS: CPT | Mod: 26

## 2025-02-27 RX ORDER — CARVEDILOL 3.12 MG/1
3.12 TABLET, FILM COATED ORAL EVERY 12 HOURS
Refills: 0 | Status: DISCONTINUED | OUTPATIENT
Start: 2025-02-27 | End: 2025-03-04

## 2025-02-27 RX ORDER — TRAMADOL HYDROCHLORIDE 50 MG/1
1 TABLET, FILM COATED ORAL
Refills: 0 | DISCHARGE

## 2025-02-27 RX ORDER — CARVEDILOL 3.12 MG/1
1 TABLET, FILM COATED ORAL
Refills: 0 | DISCHARGE

## 2025-02-27 RX ORDER — ASPIRIN 325 MG
81 TABLET ORAL DAILY
Refills: 0 | Status: DISCONTINUED | OUTPATIENT
Start: 2025-02-27 | End: 2025-03-04

## 2025-02-27 RX ORDER — OXYCODONE HYDROCHLORIDE 30 MG/1
5 TABLET ORAL EVERY 4 HOURS
Refills: 0 | Status: DISCONTINUED | OUTPATIENT
Start: 2025-02-27 | End: 2025-02-28

## 2025-02-27 RX ORDER — ATORVASTATIN CALCIUM 80 MG/1
20 TABLET, FILM COATED ORAL AT BEDTIME
Refills: 0 | Status: DISCONTINUED | OUTPATIENT
Start: 2025-02-27 | End: 2025-03-04

## 2025-02-27 RX ORDER — APIXABAN 2.5 MG/1
1 TABLET, FILM COATED ORAL
Refills: 0 | DISCHARGE

## 2025-02-27 RX ORDER — OXYCODONE HYDROCHLORIDE 30 MG/1
2.5 TABLET ORAL EVERY 4 HOURS
Refills: 0 | Status: DISCONTINUED | OUTPATIENT
Start: 2025-02-27 | End: 2025-02-28

## 2025-02-27 RX ORDER — ONDANSETRON HCL/PF 4 MG/2 ML
4 VIAL (ML) INJECTION EVERY 6 HOURS
Refills: 0 | Status: DISCONTINUED | OUTPATIENT
Start: 2025-02-27 | End: 2025-03-04

## 2025-02-27 RX ORDER — ATORVASTATIN CALCIUM 80 MG/1
40 TABLET, FILM COATED ORAL AT BEDTIME
Refills: 0 | Status: DISCONTINUED | OUTPATIENT
Start: 2025-02-27 | End: 2025-02-27

## 2025-02-27 RX ORDER — ALBUTEROL SULFATE 2.5 MG/3ML
1 VIAL, NEBULIZER (ML) INHALATION EVERY 6 HOURS
Refills: 0 | Status: DISCONTINUED | OUTPATIENT
Start: 2025-02-27 | End: 2025-03-04

## 2025-02-27 RX ORDER — LEVOTHYROXINE SODIUM 300 MCG
100 TABLET ORAL DAILY
Refills: 0 | Status: DISCONTINUED | OUTPATIENT
Start: 2025-02-27 | End: 2025-03-04

## 2025-02-27 RX ORDER — ATORVASTATIN CALCIUM 80 MG/1
1 TABLET, FILM COATED ORAL
Refills: 0 | DISCHARGE

## 2025-02-27 RX ORDER — ENOXAPARIN SODIUM 100 MG/ML
30 INJECTION SUBCUTANEOUS EVERY 24 HOURS
Refills: 0 | Status: DISCONTINUED | OUTPATIENT
Start: 2025-02-27 | End: 2025-02-28

## 2025-02-27 RX ORDER — ACETAMINOPHEN 500 MG/5ML
975 LIQUID (ML) ORAL EVERY 6 HOURS
Refills: 0 | Status: DISCONTINUED | OUTPATIENT
Start: 2025-02-27 | End: 2025-03-04

## 2025-02-27 RX ORDER — SENNA 187 MG
2 TABLET ORAL AT BEDTIME
Refills: 0 | Status: DISCONTINUED | OUTPATIENT
Start: 2025-02-27 | End: 2025-03-04

## 2025-02-27 RX ORDER — GABAPENTIN 400 MG/1
1 CAPSULE ORAL
Refills: 0 | DISCHARGE

## 2025-02-27 RX ADMIN — Medication 50 MILLILITER(S): at 17:00

## 2025-02-27 RX ADMIN — ATORVASTATIN CALCIUM 20 MILLIGRAM(S): 80 TABLET, FILM COATED ORAL at 21:16

## 2025-02-27 RX ADMIN — ENOXAPARIN SODIUM 30 MILLIGRAM(S): 100 INJECTION SUBCUTANEOUS at 17:01

## 2025-02-27 RX ADMIN — Medication 975 MILLIGRAM(S): at 16:58

## 2025-02-27 RX ADMIN — Medication 75 MILLILITER(S): at 18:45

## 2025-02-27 RX ADMIN — Medication 250 MILLILITER(S): at 18:44

## 2025-02-27 RX ADMIN — Medication 975 MILLIGRAM(S): at 16:28

## 2025-02-27 NOTE — ED ADULT NURSE NOTE - OBJECTIVE STATEMENT
Pt presents to Ed w/ generalized weakness and difficulty ambulating sp fall on Monday. Pt w/ known broken l. elbow in a soft splint s/p fall. Denies head strike or LOC. On eliquis. Pt c/o left leg pain and states she is unable to move left leg.

## 2025-02-27 NOTE — ED PROVIDER NOTE - OBJECTIVE STATEMENT
89 year old female with PMHx of CAD, HTN, HLD, hypothyroidism, and Waldenstrom's disease brought in by EMS to the ED with son at bedside c/o left leg weakness and left upper leg pain s/p mechanical fall three days ago (no LOC). Pt is now requiring assistance with ambulation. Pt is normally ambulatory at baseline. Pt also had a portable x-ray done a home on Tuesday which showed a fracture in her left arm but was negative for any findings in her legs. Pt was subsequently taken to Ortho yesterday by family where her arm was placed in a splint. Pt denies fevers.

## 2025-02-27 NOTE — H&P ADULT - HISTORY OF PRESENT ILLNESS
CC: Patient is a 89y old  Female who presents with a chief complaint of s/p Fall on 2/24.    Time Notified: 1400   Time Seen: 1405    HPI:  89y/o F with PMHx of CAD, HTN, HLD, hypothyroidism, and Waldenstrom's disease who was BIBEMS  to  ED with son at bedside c/o left leg weakness and left upper leg pain s/p mechanical fall three days ago (no HS / LOC per pt). Pt is now requiring assistance with ambulation (normally ambulatory at baseline). P reports having  portable x-ray done at home on Tuesday which showed a fracture in her left arm but was negative for any findings in her hip/legs. Pt f/u outpt with orthopedic surgeon (Dr Yang) yesterday and Lt arm was placed in a splint. ER diagnostic imaging acute/subacute left pubic fractures as discussed, age indeterminate severe  T9 and T10 compression deformities, and 9 mm pancreatic body cystic lesion.  Per chart review; + APT (ASA 81), No AC therapy    Subjective:  Pt seen and examined at bedside with chaperone (son). Pt is AAOx3, no acute distress. Endorsing Lt groin pain with active LLE ROM. Pt denied c/o fever, chills, chest pain, SOB, abd pain, N/V/D, extremity dysfunction, hemoptysis, hematemesis, hematuria, hematochezia, headache, diplopia, vertigo, dizziness.    ROS: as above otherwise negative    PMH:  CAD, HTN, HLD, hypothyroidism, and Waldenstrom's disease  PSH: S/P hysterectomy.     No Known Allergies    SH: Denies toxic habits.  FH: No pertinent FMHx     Vital Signs Last 24 Hrs  T(C): 37.1 (27 Feb 2025 11:10), Max: 37.1 (27 Feb 2025 11:10)  T(F): 98.8 (27 Feb 2025 11:10), Max: 98.8 (27 Feb 2025 11:10)  HR: 64 (27 Feb 2025 10:10) (64 - 64)  BP: 103/52 (27 Feb 2025 10:10) (103/52 - 103/52)  BP(mean): --  RR: 16 (27 Feb 2025 10:10) (16 - 16)  SpO2: 96% (27 Feb 2025 10:10) (96% - 96%)    Parameters below as of 27 Feb 2025 10:10  Patient On (Oxygen Delivery Method): room air        Labs:               10.2   10.51 )-----------( 194      ( 27 Feb 2025 11:01 )             32.1     CBC Full  -  ( 27 Feb 2025 11:01 )  WBC Count : 10.51 K/uL  RBC Count : 3.20 M/uL  Hemoglobin : 10.2 g/dL  Hematocrit : 32.1 %  Platelet Count - Automated : 194 K/uL  Mean Cell Volume : 100.3 fl  Mean Cell Hemoglobin : 31.9 pg  Mean Cell Hemoglobin Concentration : 31.8 g/dL  Auto Neutrophil # : 7.28 K/uL  Auto Lymphocyte # : 1.71 K/uL  Auto Monocyte # : 1.41 K/uL  Auto Eosinophil # : 0.06 K/uL  Auto Basophil # : 0.02 K/uL  Auto Neutrophil % : 69.2 %  Auto Lymphocyte % : 16.3 %  Auto Monocyte % : 13.4 %  Auto Eosinophil % : 0.6 %  Auto Basophil % : 0.2 %    02-27    137  |  110[H]  |  74[H]  ----------------------------<  173[H]  5.0   |  23  |  1.51[H]    Ca    10.0      27 Feb 2025 11:01  Mg     2.7     02-27    TPro  7.0  /  Alb  3.2[L]  /  TBili  1.0  /  DBili  x   /  AST  35  /  ALT  27  /  AlkPhos  65  02-27    LIVER FUNCTIONS - ( 27 Feb 2025 11:01 )  Alb: 3.2 g/dL / Pro: 7.0 gm/dL / ALK PHOS: 65 U/L / ALT: 27 U/L / AST: 35 U/L / GGT: x             Meds:  acetaminophen     Tablet .. 975 milliGRAM(s) Oral every 6 hours  enoxaparin Injectable 30 milliGRAM(s) SubCutaneous every 24 hours  morphine  - Injectable 2 milliGRAM(s) IV Push every 4 hours PRN  ondansetron Injectable 4 milliGRAM(s) IV Push every 6 hours PRN  oxyCODONE    IR 2.5 milliGRAM(s) Oral every 4 hours PRN  oxyCODONE    IR 5 milliGRAM(s) Oral every 4 hours PRN  pantoprazole    Tablet 40 milliGRAM(s) Oral before breakfast  senna 2 Tablet(s) Oral at bedtime PRN  sodium chloride 0.9%. 1000 milliLiter(s) IV Continuous <Continuous>      Radiology:  < from: CT Abdomen and Pelvis No Cont (02.27.25 @ 12:43) >  IMPRESSION:  Acute/subacute left pubic fractures as discussed.  Age indeterminate severe  T9 and T10 compression deformities, new from   8/13/2022  Otherwise no acute sequelae of trauma on this noncontrast study    9 mm pancreatic body cystic lesion. Recommend pancreatic protocol MR for   additional characterization    Additional findings as discussed    --- End of Report ---    < end of copied text >    PRIMARY SURVEY:   A - airway intact  B - bilateral breath sounds and good chest rise  C - initial BP stable , palpable pulses in all extremities  D - GCS 15 on arrival. E 4, V 5, M 6.   Exposure obtained    SECONDARY SURVEY:   GCS of 15  Airway is patent  Breathing is symmetric and unlabored  Neuro: CNII-XII grossly intact  Psych: normal affect  HEENT: Normocephalic, atraumatic, SHANNAN, EOM wnl, no otorrhea or hemotympanum b/l, no epistaxis or d/c b/l nares, no craniofacial bony pathology or tenderness b/l  Neck: No crepitus, no ecchymosis, no hematoma, to exam, no JVD, no tracheal deviation  Cspine/thoracolumbrosacral spine: +mild midline tenderness over thoracic spine. No gross bony pathology or tenderness to exam  Cardiovascular: S1S2 Present  Chest: no gross rib pathology or tenderness to exam. No sternal pathology or tenderness to exam. No crepitus, no ecchymosis, no hematoma. No penetrating thorcoabdominal trauma  Respiratory: Respiratory Effort normal; no wheezes, rales or rhonchi to exam  ABD: bowel sounds (+), soft, nontender, non distended, no rebound, no guarding, no rigidity, no skin changes to exam. No pelvic instability to exam, no skin changes  Musculoskeletal: +TTP over Lt lateral groin region. Pt has palpable b/l radial, femoral, dorsalis pedis pulses. All digits are warm and well perfused. No gross long bone pathology or tenderness to exam. Pt demonstrates grossly intact sensoromotor function. Pt has good capillary refill to digits, no calf edema or tenderness to exam.  Skin: no lesions or rashes to exam
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
## 2025-02-27 NOTE — CONSULT NOTE ADULT - SUBJECTIVE AND OBJECTIVE BOX
CC: Patient is a 89y old  Female who presents with a chief complaint of s/p Fall on 2/24.    HPI:  88y/o F with PMHx of CAD, HTN, HLD, hypothyroidism, and Waldenstrom's disease who was BIBEMS  to  ED with son at bedside c/o left leg weakness and left upper leg pain s/p mechanical fall three days ago (no HS / LOC per pt). Pt is now requiring assistance with ambulation (normally ambulatory at baseline). P reports having  portable x-ray done at home on Tuesday which showed a fracture in her left arm but was negative for any findings in her hip/legs. Pt f/u outpt with orthopedic surgeon (Dr Yang) yesterday and Lt arm was placed in a splint. ER diagnostic imaging acute/subacute left pubic fractures as discussed, age indeterminate severe  T9 and T10 compression deformities, and 9 mm pancreatic body cystic lesion.  Per chart review; + APT (ASA 81), No AC therapy        PAST MEDICAL & SURGICAL HISTORY:  Hypertension      Waldenstrom's disease      HLD (hyperlipidemia)      CAD (coronary artery disease)      Hypothyroidism      S/P hysterectomy          MEDICATIONS  (STANDING):  acetaminophen     Tablet .. 975 milliGRAM(s) Oral every 6 hours  aspirin enteric coated 81 milliGRAM(s) Oral daily  atorvastatin 40 milliGRAM(s) Oral at bedtime  carvedilol 3.125 milliGRAM(s) Oral every 12 hours  enoxaparin Injectable 30 milliGRAM(s) SubCutaneous every 24 hours  levothyroxine 100 MICROGram(s) Oral daily  pantoprazole    Tablet 40 milliGRAM(s) Oral before breakfast  sodium chloride 0.9% Bolus 500 milliLiter(s) IV Bolus once  sodium chloride 0.9%. 1000 milliLiter(s) (75 mL/Hr) IV Continuous <Continuous>    MEDICATIONS  (PRN):  albuterol    90 MICROgram(s) HFA Inhaler 1 Puff(s) Inhalation every 6 hours PRN Shortness of Breath and/or Wheezing  morphine  - Injectable 2 milliGRAM(s) IV Push every 4 hours PRN brekathrough pain  ondansetron Injectable 4 milliGRAM(s) IV Push every 6 hours PRN Nausea  oxyCODONE    IR 2.5 milliGRAM(s) Oral every 4 hours PRN Moderate Pain (4 - 6)  oxyCODONE    IR 5 milliGRAM(s) Oral every 4 hours PRN Severe Pain (7 - 10)  senna 2 Tablet(s) Oral at bedtime PRN Constipation      Allergies    No Known Allergies    Intolerances        SOCIAL HISTORY:    FAMILY HISTORY:  No pertinent family history in first degree relatives      REVIEW OF SYSTEMS:    CONSTITUTIONAL: No weakness, fevers or chills  EYES/ENT: No visual changes;  No vertigo or throat pain   NECK: No pain or stiffness  RESPIRATORY: No cough, wheezing, hemoptysis; No shortness of breath  CARDIOVASCULAR: No chest pain or palpitations  GASTROINTESTINAL: No abdominal or epigastric pain. No nausea, vomiting, or hematemesis; No diarrhea or constipation. No melena or hematochezia.  GENITOURINARY: No dysuria, frequency or hematuria  NEUROLOGICAL: No numbness or weakness  SKIN: No itching, burning, rashes, or lesions   All other review of systems is negative unless indicated above.  Vital Signs Last 24 Hrs  T(C): 36.7 (27 Feb 2025 17:22), Max: 37.1 (27 Feb 2025 11:10)  T(F): 98.1 (27 Feb 2025 17:22), Max: 98.8 (27 Feb 2025 11:10)  HR: 65 (27 Feb 2025 17:22) (64 - 70)  BP: 88/50 (27 Feb 2025 17:22) (88/50 - 103/52)  BP(mean): 55 (27 Feb 2025 16:11) (55 - 55)  RR: 18 (27 Feb 2025 17:22) (16 - 19)  SpO2: 95% (27 Feb 2025 17:22) (95% - 96%)    Parameters below as of 27 Feb 2025 17:22  Patient On (Oxygen Delivery Method): room air      PHYSICAL EXAM:    General: in no acute distress  Eyes: PERRLA, EOMI; conjunctiva and sclera clear  Head: Normocephalic; atraumatic  ENMT: No nasal discharge; airway clear  Neck: Supple; non tender; no masses  Respiratory: No wheezes, rales or rhonchi  Cardiovascular: Regular rate and rhythm. S1 and S2 Normal; No murmurs, gallops or rubs  Gastrointestinal: Soft non-tender non-distended; Normal bowel sounds  Genitourinary: No  suprapubic  tenderness  Extremities: Normal range of motion, No clubbing, cyanosis or edema  Vascular: Peripheral pulses palpable 2+ bilaterally  Neurological: Alert and oriented x4  Skin: Warm and dry. No acute rash  Lymph Nodes: No acute cervical adenopathy  Musculoskeletal: Normal muscle tone, without deformities  Psychiatric: Cooperative and appropriate  LABS: all reviewed                         10.2   10.51 )-----------( 194      ( 27 Feb 2025 11:01 )             32.1     02-27    137  |  110[H]  |  74[H]  ----------------------------<  173[H]  5.0   |  23  |  1.51[H]    Ca    10.0      27 Feb 2025 11:01  Mg     2.7     02-27    TPro  7.0  /  Alb  3.2[L]  /  TBili  1.0  /  DBili  x   /  AST  35  /  ALT  27  /  AlkPhos  65  02-27      Urinalysis Basic - ( 27 Feb 2025 11:01 )    Color: x / Appearance: x / SG: x / pH: x  Gluc: 173 mg/dL / Ketone: x  / Bili: x / Urobili: x   Blood: x / Protein: x / Nitrite: x   Leuk Esterase: x / RBC: x / WBC x   Sq Epi: x / Non Sq Epi: x / Bacteria: x        RADIOLOGY & ADDITIONAL STUDIES: CC: Patient is a 89y old  Female who presents with a chief complaint of s/p Fall on 2/24.    HPI:  89y/o F with PMHx of CAD, HTN, HLD, CHF, hypothyroidism, and Waldenstrom's disease who was BIBEMS  to  ED with son at bedside c/o left leg weakness and left upper leg pain s/p mechanical fall three days ago (no HS / LOC per pt). Pt is now requiring assistance with ambulation (normally ambulatory at baseline). P reports having  portable x-ray done at home on Tuesday which showed a fracture in her left arm but was negative for any findings in her hip/legs. Pt f/u outpt with orthopedic surgeon (Dr Yang) yesterday and Lt arm was placed in a splint. ER diagnostic imaging acute/subacute left pubic fractures as discussed, age indeterminate severe  T9 and T10 compression deformities, and 9 mm pancreatic body cystic lesion.   Pt reports L  Hip pain, has  chronic back pain, not much worse  after fall. No SOB or CP. As per daughter at bedside Pt has h/o osteoporosis and has h/o falls. On "water Pill": furosemide, Spironolactone and last was added Farxiga about 1 week ago by Cardio NP.   Pt reports had leg swelling but now improved. Pt reports  did not void since this am.           PAST MEDICAL & SURGICAL HISTORY:  Hypertension  Waldenstrom's disease  HLD (hyperlipidemia  CAD (coronary artery disease)  Hypothyroidism  S/P hysterectomy        MEDICATIONS  (STANDING):  acetaminophen     Tablet .. 975 milliGRAM(s) Oral every 6 hours  aspirin enteric coated 81 milliGRAM(s) Oral daily  atorvastatin 40 milliGRAM(s) Oral at bedtime  carvedilol 3.125 milliGRAM(s) Oral every 12 hours  enoxaparin Injectable 30 milliGRAM(s) SubCutaneous every 24 hours  levothyroxine 100 MICROGram(s) Oral daily  pantoprazole    Tablet 40 milliGRAM(s) Oral before breakfast  sodium chloride 0.9% Bolus 500 milliLiter(s) IV Bolus once  sodium chloride 0.9%. 1000 milliLiter(s) (75 mL/Hr) IV Continuous <Continuous>    MEDICATIONS  (PRN):  albuterol    90 MICROgram(s) HFA Inhaler 1 Puff(s) Inhalation every 6 hours PRN Shortness of Breath and/or Wheezing  morphine  - Injectable 2 milliGRAM(s) IV Push every 4 hours PRN brekathrough pain  ondansetron Injectable 4 milliGRAM(s) IV Push every 6 hours PRN Nausea  oxyCODONE    IR 2.5 milliGRAM(s) Oral every 4 hours PRN Moderate Pain (4 - 6)  oxyCODONE    IR 5 milliGRAM(s) Oral every 4 hours PRN Severe Pain (7 - 10)  senna 2 Tablet(s) Oral at bedtime PRN Constipation      Allergies  No Known Allergies      SOCIAL HISTORY: non smoker but report ssecond hand exposure. Occasional wine use but did not drink for at least 1 month. Usually ambulates independent     FAMILY HISTORY:  H/o stroke in sister     REVIEW OF SYSTEMS:  All other review of systems is negative unless indicated above.    Vital Signs Last 24 Hrs  T(C): 36.7 (27 Feb 2025 17:22), Max: 37.1 (27 Feb 2025 11:10)  T(F): 98.1 (27 Feb 2025 17:22), Max: 98.8 (27 Feb 2025 11:10)  HR: 65 (27 Feb 2025 17:22) (64 - 70)  BP: 88/50 (27 Feb 2025 17:22) (88/50 - 103/52)  BP(mean): 55 (27 Feb 2025 16:11) (55 - 55)  RR: 18 (27 Feb 2025 17:22) (16 - 19)  SpO2: 95% (27 Feb 2025 17:22) (95% - 96%)    Parameters below as of 27 Feb 2025 17:22  Patient On (Oxygen Delivery Method): room air      PHYSICAL EXAM:  General: in no acute distress  Eyes: EOMI; conjunctiva and sclera clear  Head: Normocephalic; atraumatic  ENMT: No nasal discharge; airway clear  Respiratory: Good air entry No wheezes, rales   Cardiovascular: Regular rate and rhythm. S1 and S2 Normal; No murmurs  Gastrointestinal: Soft non-tender non-distended; Normal bowel sounds  Genitourinary: No  suprapubic  tenderness  Extremities: No LE   edema, Decreased ROM LLE,  LUE  in posterior splint, able to move digits, good strength   Neurological: Alert and oriented x3, mildly forgetful,  speech is clear, no facial asymmetry   Musculoskeletal: Normal muscle tone, without deformities  Skin: L hip bruise   Psychiatric: Cooperative and appropriate      LABS: all reviewed                         10.2   10.51 )-----------( 194      ( 27 Feb 2025 11:01 )             32.1     02-27    137  |  110[H]  |  74[H]  ----------------------------<  173[H]  5.0   |  23  |  1.51[H]    Ca    10.0      27 Feb 2025 11:01  Mg     2.7     02-27    TPro  7.0  /  Alb  3.2[L]  /  TBili  1.0  /  DBili  x   /  AST  35  /  ALT  27  /  AlkPhos  65  02-27      Urinalysis Basic - ( 27 Feb 2025 11:01 )    Color: x / Appearance: x / SG: x / pH: x  Gluc: 173 mg/dL / Ketone: x  / Bili: x / Urobili: x   Blood: x / Protein: x / Nitrite: x   Leuk Esterase: x / RBC: x / WBC x   Sq Epi: x / Non Sq Epi: x / Bacteria: x        RADIOLOGY & ADDITIONAL STUDIES:    ACC: [id number] EXAM:  XR FEMUR 2 VIEWS LT   ORDERED BY: SERGIO HUITRON     ACC: [id number] EXAM:  XR HIP WITH PELV MIN 4V LT   ORDERED BY: SERGIO HUITRON     PROCEDURE DATE:  02/27/2025          INTERPRETATION:  Clinical history: 89-year-old female, fall.    AP view the pelvis, 2 views of the left hip and 4 views of the left femur   are correlated with a concurrent abdominal CT.    FINDINGS: Acute/subacute nondisplaced fractures of the left   superior/inferior pubic rami.    Prior right pubic fracture.    Mild degenerative change with no other acute fracture dislocation or   radiographic soft tissue abnormality.    IMPRESSION:  Acute/subacute nondisplaced fractures of the left superior/inferior pubic   rami, better characterized on CT.    ACC: [id number] EXAM:  CT ABDOMEN AND PELVIS   ORDERED BY: SERGIO HUITRON     PROCEDURE DATE:  02/27/2025          INTERPRETATION:  CLINICAL INFORMATION: Left hip pain    COMPARISON: CT chest 8/13/2022    CONTRAST/COMPLICATIONS:  IV Contrast: NONE  Oral Contrast: NONE  .    PROCEDURE:  CT of the Abdomen and Pelvis was performed.  Sagittal and coronal reformats were performed.    FINDINGS:  LOWER CHEST: Cardiomegaly with vascular calcification. In situ cardiac   device. Decreased cardiac chamber blood pool attenuation suggests anemia.   Trace bibasilar effusions with dependent atelectasis. Patchy opacity   right lower lobe improved from prior may represent atelectasis or chronic   consolidation. Infection not excluded in the correct setting    LIVER: Within normal limits.  BILE DUCTS: Normal caliber.  GALLBLADDER: Within normal limits.  SPLEEN: Within normal limits.  PANCREAS:  9 mm cystic lesion pancreatic body (2, 27). Correlate with   pancreatic MRI  ADRENALS: Within normal limits.  KIDNEYS/URETERS: No hydronephrosis or urolithiasis. Bilateral nonspecific   perinephric stranding similar to prior.    BLADDER: Within normal limits.  REPRODUCTIVE ORGANS: Hysterectomy.    BOWEL: No bowel obstruction. Appendix normal  PERITONEUM/RETROPERITONEUM: Within normal limits.  VESSELS: Atherosclerotic, nonaneurysmal.  LYMPH NODES: No lymphadenopathy.  ABDOMINAL WALL: Diastases recti (mild). Small fat-containing umbilical   hernia  BONES: Acute/subacute nondisplaced fractures of the left inferior   superior pubic rami. There may be small amount of early callus. Old right   pubic fracture. Old left posterior 10th rib fracture. Stable severe   chronic T12 compression deformity. New age-indeterminate severe T10   compression deformity and T9 compression deformity. Correlate with point   tenderness    IMPRESSION:  Acute/subacute left pubic fractures as discussed.  Age indeterminate severe  T9 and T10 compression deformities, new from   8/13/2022  Otherwise no acute sequelae of trauma on this noncontrast study    9 mm pancreatic body cystic lesion. Recommend pancreatic protocol MR for   additional characterization

## 2025-02-27 NOTE — CONSULT NOTE ADULT - ASSESSMENT
89y/o F with PMHx of CAD, HTN, HLD, CHF, hypothyroidism, and Waldenstrom's disease  admitted for:    1. S/p recent fall resulted in Multiple Fx, unable to ambulate   Acute/ subacute L pubic rami FX, T9-10  compression FX    L olecranon FX   management as per Trauma SX   LIUE NWB  Control pain  Spine eval  ortho  records noted, no need in LUE XR a sper DR Kee, c/w posterior splint   PT     2. Hypotension, decreased UO  Likely 2/2 hypovolemia and dehydration Pt is on 3 diuretics at home, last added about 1 week ago   Hold diuretics  NS bolus 500ml ordered by primary team  C/w Gentle IVF, re eval in am   Bladder scan to r/o retention   Will ask cardio eval, sees Dr Tarango       3.  Elevated BUN/CR,  prerenal due to overdiuresis and dehydration   IVF ordered  Hold diuretics  CT abd/pelvis with no urinary  obstruction   Check UA   Bladder scan   LAbs in am if no improvement of Renal Fx, consider renal eval   Avoid nephrotoxic meds       3. 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 Waldenstrom's disease. Chronic Anemia  Monitor H/H, baseline HB 9-10  On [place] outPt, Pt has medication with her   d/w Pharmacist needs heme/onc approval  Consult for Dr Lina armijo     5. Hypothyroidism  C/w Levothyroxine     6. DVT PPX: Lovenox       Thank you for consult will follow         Total time 75 min

## 2025-02-27 NOTE — ED PROVIDER NOTE - CLINICAL SUMMARY MEDICAL DECISION MAKING FREE TEXT BOX
89 year old female with PMHx of CAD, HTN, HLD, hypothyroidism, and Waldenstrom's disease brought in by EMS to the ED with son at bedside c/o left leg weakness and left upper leg pain s/p mechanical fall three days ago (no LOC). Pt is now requiring assistance with ambulation. Pt is normally ambulatory at baseline. Pt also had a portable x-ray done a home on Tuesday which showed a fracture in her left arm but was negative for any findings in her legs.   Patient with left groin tenderness otherwise hemodynamically stable and well-appearing.  Differential includes not limited to pelvic fracture versus hip fracture, infection for generalized weakness, ACS although low suspicion.  Plan for labs, CT, x-ray, urine, EKG.  Reassess

## 2025-02-27 NOTE — ED ADULT TRIAGE NOTE - CHIEF COMPLAINT QUOTE
pt presents to ED due to complaints of BL leg weakness worse than normal as per EMS pt had a fall 3 days ago and had a portable xray done at home and xray showed she has a fx in her left arm pt taken to ortho by family where her arm was placed in a splint

## 2025-02-27 NOTE — ED PROVIDER NOTE - PHYSICAL EXAMINATION
GEN: Patient awake alert NAD.   HEENT: normocephalic, atraumatic, EOMI, no scleral icterus, moist MM  CARDIAC: RRR, S1, S2, no murmur.   PULM: CTA B/L no wheeze, rhonchi, rales.   ABD: soft NT, ND, no rebound no guarding, no CVA tenderness.   MSK: Moving all extremities, no edema, with left groin tenderness  NEURO: A&Ox3, gait normal, no focal neurological deficits, CN 2-12 grossly intact  SKIN: warm, dry, no rash. GEN: Patient awake alert NAD.   HEENT: normocephalic, atraumatic, EOMI, no scleral icterus, moist MM  CARDIAC: RRR, S1, S2, no murmur.   PULM: CTA B/L no wheeze, rhonchi, rales.   ABD: soft NT, ND, no rebound no guarding, no CVA tenderness.   MSK: Moving all extremities, no edema, with left groin tenderness  NEURO: A&Ox3, gait normal, no focal neurological deficits,  SKIN: warm, dry, no rash.

## 2025-02-27 NOTE — H&P ADULT - NSTOBACCOSCREENHP_GEN_A_CS
No Render In Strict Bullet Format?: No Initiate Treatment: Tacrolimus 0.1 % topical ointment QD Detail Level: Zone Continue Regimen: clobetasol 0.05 % topical cream BID PRN \\ncalcipotriene 0.005 % topical cream Monday Wednesday Friday PRN Continue Regimen: Metronidazole 0.75 % topical cream QHS Initiate Treatment: Azelaic Acid 15 % topical gel QAM

## 2025-02-27 NOTE — ED ADULT NURSE NOTE - NSFALLHARMRISKINTERV_ED_ALL_ED

## 2025-02-27 NOTE — ED PROVIDER NOTE - PROGRESS NOTE DETAILS
Patient with left superior pubic ramus fracture and has difficulty ambulating.  Will admit to trauma.

## 2025-02-27 NOTE — H&P ADULT - ASSESSMENT
89y/o F with PMHx of CAD, HTN, HLD, hypothyroidism, and Waldenstrom's disease who was BIBEMS  to  ED with son at bedside c/o left leg weakness and left upper leg pain s/p mechanical fall three days ago - 2/24/24 (no HS / LOC per pt). Pt is now requiring assistance with ambulation (normally ambulatory at baseline). P reports having  portable x-ray done at home on Tuesday which showed a fracture in her left arm but was negative for any findings in her hip/legs. Pt f/u outpt with orthopedic surgeon (Dr Yang) yesterday and Lt arm was placed in a splint. ER diagnostic imaging acute/subacute left pubic fractures as discussed, age indeterminate severe  T9 and T10 compression deformities, and 9 mm pancreatic body cystic lesion.  Per chart review; + APT (ASA 81), No AC therapy    #Mechanical Fall 2/24  #Lt Elbow Fracture  #Lt Pelvic Fracture  #Age indtm T9 & T10 Compression Deformity  #Pancreatic Body Cystic Lesion  #CAD  #HTN  #Hypothyroidism  #Waldenstrom's disease    Plan:  Admit to Tele Floor under care of Trauma Surgery  Fall sustained 2/24/2024 without repeat trauma  -Repeat Xray LUE to eval extent of injury  -PRN analgesic and antiemetic therapy  -PT eval     Cont DVT ppx (VTE and Lovenox). GI ppx   Cont IS and deep breathing exercises  Cont diet as tolerated   Cont appropriate home medication mgmt  SW and CM to eval for disp planning   Tertiary eval in AM    Consultations;  -Hospitalist: Co-mgmt and further clinical optimization.  -Orthopedic surgery: Eval RUE and need for further interventions  -Spine surgery: Eval for further diagnostic imaging /interventions  -GI: Eval pancreatic cystic lesion and need for further diagnostic imaging/interventions    Case and plan discussed with surgical attending 88y/o F with PMHx of CAD, HTN, HLD, hypothyroidism, and Waldenstrom's disease who was BIBEMS  to  ED with son at bedside c/o left leg weakness and left upper leg pain s/p mechanical fall three days ago - 2/24/24 (no HS / LOC per pt). Pt is now requiring assistance with ambulation (normally ambulatory at baseline). P reports having  portable x-ray done at home on Tuesday which showed a fracture in her left arm but was negative for any findings in her hip/legs. Pt f/u outpt with orthopedic surgeon (Dr Yang) yesterday and Lt arm was placed in a splint. ER diagnostic imaging acute/subacute left pubic fractures as discussed, age indeterminate severe  T9 and T10 compression deformities, and 9 mm pancreatic body cystic lesion.  Per chart review; + APT (ASA 81), No AC therapy    #Mechanical Fall 2/24  #Lt Elbow Fracture  #Lt Pelvic Fracture  #Age indtm T9 & T10 Compression Deformity  #Pancreatic Body Cystic Lesion  #BONNIE (Baseline Cr ~0.7)  #Leukocytosis  #CAD  #HTN  #Hypothyroidism  #Waldenstrom's disease    Plan:  Admit to Tele Floor under care of Trauma Surgery  Fall sustained 2/24/2024 without repeat trauma  -Repeat Xray LUE to eval extent of injury  -PRN analgesic and antiemetic therapy  -PT eval     Leukocytosis - likely reactive. Trend WBC curve/monitor temps  Monitor I/O's, UO. Replete lytes PRN  Cont IVF hydration   Avoid nephrotoxic medication mgmt  Cont DVT ppx (VTE and Lovenox). GI ppx   Cont IS and deep breathing exercises  Cont diet as tolerated   Cont appropriate home medication mgmt  SW and CM to eval for disp planning   Tertiary eval in AM    Consultations;  -Hospitalist: Co-mgmt and further clinical optimization.  -Orthopedic surgery: Eval RUE and need for further interventions  -Spine surgery: Eval for further diagnostic imaging /interventions  -GI: Eval pancreatic cystic lesion and need for further diagnostic imaging/interventions    Case and plan discussed with surgical attending 88y/o F with PMHx of CAD, HTN, HLD, hypothyroidism, and Waldenstrom's disease who was BIBEMS  to  ED with son at bedside c/o left leg weakness and left upper leg pain s/p mechanical fall three days ago - 2/24/24 (no HS / LOC per pt). Pt is now requiring assistance with ambulation (normally ambulatory at baseline). P reports having  portable x-ray done at home on Tuesday which showed a fracture in her left arm but was negative for any findings in her hip/legs. Pt f/u outpt with orthopedic surgeon (Dr Yang) yesterday and Lt arm was placed in a splint. ER diagnostic imaging acute/subacute left pubic fractures as discussed, age indeterminate severe  T9 and T10 compression deformities, and 9 mm pancreatic body cystic lesion.  Per chart review; + APT (ASA 81), No AC therapy    #Mechanical Fall 2/24  #Lt Elbow Fracture  #Lt Pelvic Fracture  #Age indtm T9 & T10 Compression Deformity  #Pancreatic Body Cystic Lesion  #BONNIE (Baseline Cr ~0.7)  #Leukocytosis  #CAD  #HTN  #Hypothyroidism  #Waldenstrom's disease    Plan:  Admit to Tele Floor under care of Trauma Surgery  Fall sustained 2/24/2024 without repeat trauma  -Repeat Xray LUE to eval extent of injury  -PRN analgesic and antiemetic therapy  -PT eval     Leukocytosis - likely reactive. Trend WBC curve/monitor temps  Monitor I/O's, UO. Replete lytes PRN  Cont IVF hydration   Avoid nephrotoxic medication mgmt   Holding Lasix and Entresto in setting of acute BONNIE  Cont DVT ppx (VTE and Lovenox). GI ppx   Cont IS and deep breathing exercises  Cont diet as tolerated   Cont appropriate home medication mgmt  SW and CM to eval for disp planning   Tertiary eval in AM    Consultations;  -Hospitalist: Co-mgmt and further clinical optimization.  -Orthopedic surgery: Eval RUE and need for further interventions  -Spine surgery: Eval for further diagnostic imaging /interventions  -GI: Eval pancreatic cystic lesion and need for further diagnostic imaging/interventions    Case and plan discussed with surgical attending

## 2025-02-27 NOTE — CONSULT NOTE ADULT - SUBJECTIVE AND OBJECTIVE BOX
Orthopedics Consult Note:    This is a 88y/o RHE Female who presents to the ED today with c/o left leg pain and weakness with difficulty ambulating s/p fall at home 3 days ago 2/25/25. At that time portable X ray was taken in her home showing a displaced olecranon fx. Pt saw Dr Yang in office yesterday and was placed in posterior splint. After telephone conversation with Dr Yang, the olecranon fx is going to be treated non operatively. Since the fall pt has been having difficulty ambulating with weakness and discomfort in Left lower extremity. ambulates without assistive device at baseline.  Pt denies head trauma or LOC. Pt denies any numbness, tingling or parethesias.    Past Medical & Surgical History:  Other specified fracture of unspecified pubis, initial encounter for closed fracture    No pertinent family history in first degree relatives    MEWS Score    Hypertension    Waldenstrom's disease    HLD (hyperlipidemia)    CAD (coronary artery disease)    Hypothyroidism    Pubic ramus fracture    No significant past surgical history    S/P hysterectomy    fall weakness    90+    [id number]        Allergies:  No Known Allergies      Vital Signs:  T(C): 37.1 (02-27-25 @ 11:10), Max: 37.1 (02-27-25 @ 11:10)  HR: 64 (02-27-25 @ 10:10) (64 - 64)  BP: 103/52 (02-27-25 @ 10:10) (103/52 - 103/52)  RR: 16 (02-27-25 @ 10:10) (16 - 16)  SpO2: 96% (02-27-25 @ 10:10) (96% - 96%)    Labs:      X-rays of the pelvis, left hip and femur demonstrates left inferior and superior pubic rami fractures.  CT scan of the pelvis demonstrates left inferior and superior pubic rami fractures.    PE left lower extremity   No swelling, no ecchymosis, no erythema, skin intact, normothermic. +TTP over pubis. No groin or troch tenderness. Limited active ROM 2' pain. Unable to SLR. No pain with axial loading or log roll. Moving all toes and ankle, +EHL/FHL/TA/GS. SILT throughout. DP and PT pulses 2+.    PE LUE:  posterior splint intact  FROM fingers  fingers warm   SILT   cap refill brisk   arm elevated on pillows     Spine and ribs with no bony TTP.     Assessment:  89y Female with left inferior and superior pubic rami fractures. Left displaced olecranon fx, conservative treatment, being managed by Dr Yang as outpatient    Plan:  patient is being admitted to trauma service   No acute orthopedic surgical intervention indicated at this time.  WBAT LLE.  NWB LUE, elevation and encourage ROM fingers   PT  no x rays of LUE needed per Dr Yang - orders cancelled   Pain control.  Ice application.  DVT prophylaxis.   Follow-up with Dr. Sheets in the office in 2 weeks; call office for appointment.

## 2025-02-27 NOTE — PATIENT PROFILE ADULT - FALL HARM RISK - HARM RISK INTERVENTIONS

## 2025-02-28 LAB
ADD ON TEST-SPECIMEN IN LAB: SIGNIFICANT CHANGE UP
ANION GAP SERPL CALC-SCNC: 5 MMOL/L — SIGNIFICANT CHANGE UP (ref 5–17)
BUN SERPL-MCNC: 58 MG/DL — HIGH (ref 7–23)
CALCIUM SERPL-MCNC: 9.2 MG/DL — SIGNIFICANT CHANGE UP (ref 8.5–10.1)
CHLORIDE SERPL-SCNC: 115 MMOL/L — HIGH (ref 96–108)
CO2 SERPL-SCNC: 23 MMOL/L — SIGNIFICANT CHANGE UP (ref 22–31)
CREAT SERPL-MCNC: 0.98 MG/DL — SIGNIFICANT CHANGE UP (ref 0.5–1.3)
EGFR: 55 ML/MIN/1.73M2 — LOW
EGFR: 55 ML/MIN/1.73M2 — LOW
GLUCOSE SERPL-MCNC: 107 MG/DL — HIGH (ref 70–99)
HCT VFR BLD CALC: 29.4 % — LOW (ref 34.5–45)
HCT VFR BLD CALC: 29.6 % — LOW (ref 34.5–45)
HGB BLD-MCNC: 9.4 G/DL — LOW (ref 11.5–15.5)
HGB BLD-MCNC: 9.6 G/DL — LOW (ref 11.5–15.5)
MAGNESIUM SERPL-MCNC: 2.4 MG/DL — SIGNIFICANT CHANGE UP (ref 1.6–2.6)
MCHC RBC-ENTMCNC: 31.8 G/DL — LOW (ref 32–36)
MCHC RBC-ENTMCNC: 32 PG — SIGNIFICANT CHANGE UP (ref 27–34)
MCHC RBC-ENTMCNC: 32.7 G/DL — SIGNIFICANT CHANGE UP (ref 32–36)
MCHC RBC-ENTMCNC: 32.7 PG — SIGNIFICANT CHANGE UP (ref 27–34)
MCV RBC AUTO: 100 FL — SIGNIFICANT CHANGE UP (ref 80–100)
MCV RBC AUTO: 100.7 FL — HIGH (ref 80–100)
NRBC # BLD AUTO: 0 K/UL — SIGNIFICANT CHANGE UP (ref 0–0)
NRBC # BLD AUTO: 0 K/UL — SIGNIFICANT CHANGE UP (ref 0–0)
NRBC # FLD: 0 K/UL — SIGNIFICANT CHANGE UP (ref 0–0)
NRBC # FLD: 0 K/UL — SIGNIFICANT CHANGE UP (ref 0–0)
NRBC BLD AUTO-RTO: 0 /100 WBCS — SIGNIFICANT CHANGE UP (ref 0–0)
NRBC BLD AUTO-RTO: 0 /100 WBCS — SIGNIFICANT CHANGE UP (ref 0–0)
NT-PROBNP SERPL-SCNC: 3129 PG/ML — HIGH (ref 0–450)
PHOSPHATE SERPL-MCNC: 2.5 MG/DL — SIGNIFICANT CHANGE UP (ref 2.5–4.5)
PLATELET # BLD AUTO: 182 K/UL — SIGNIFICANT CHANGE UP (ref 150–400)
PLATELET # BLD AUTO: 200 K/UL — SIGNIFICANT CHANGE UP (ref 150–400)
PMV BLD: 11.7 FL — SIGNIFICANT CHANGE UP (ref 7–13)
PMV BLD: 12 FL — SIGNIFICANT CHANGE UP (ref 7–13)
POTASSIUM SERPL-MCNC: 4.4 MMOL/L — SIGNIFICANT CHANGE UP (ref 3.5–5.3)
POTASSIUM SERPL-SCNC: 4.4 MMOL/L — SIGNIFICANT CHANGE UP (ref 3.5–5.3)
RBC # BLD: 2.94 M/UL — LOW (ref 3.8–5.2)
RBC # BLD: 2.94 M/UL — LOW (ref 3.8–5.2)
RBC # FLD: 15.9 % — HIGH (ref 10.3–14.5)
RBC # FLD: 16 % — HIGH (ref 10.3–14.5)
SODIUM SERPL-SCNC: 143 MMOL/L — SIGNIFICANT CHANGE UP (ref 135–145)
WBC # BLD: 7.84 K/UL — SIGNIFICANT CHANGE UP (ref 3.8–10.5)
WBC # BLD: 9.24 K/UL — SIGNIFICANT CHANGE UP (ref 3.8–10.5)
WBC # FLD AUTO: 7.84 K/UL — SIGNIFICANT CHANGE UP (ref 3.8–10.5)
WBC # FLD AUTO: 9.24 K/UL — SIGNIFICANT CHANGE UP (ref 3.8–10.5)

## 2025-02-28 PROCEDURE — 99232 SBSQ HOSP IP/OBS MODERATE 35: CPT

## 2025-02-28 PROCEDURE — 93010 ELECTROCARDIOGRAM REPORT: CPT

## 2025-02-28 PROCEDURE — 99221 1ST HOSP IP/OBS SF/LOW 40: CPT

## 2025-02-28 RX ORDER — APIXABAN 2.5 MG/1
2.5 TABLET, FILM COATED ORAL EVERY 12 HOURS
Refills: 0 | Status: DISCONTINUED | OUTPATIENT
Start: 2025-02-28 | End: 2025-03-04

## 2025-02-28 RX ORDER — TRAMADOL HYDROCHLORIDE 50 MG/1
50 TABLET, FILM COATED ORAL EVERY 8 HOURS
Refills: 0 | Status: DISCONTINUED | OUTPATIENT
Start: 2025-02-28 | End: 2025-03-04

## 2025-02-28 RX ORDER — TRAMADOL HYDROCHLORIDE 50 MG/1
25 TABLET, FILM COATED ORAL EVERY 8 HOURS
Refills: 0 | Status: DISCONTINUED | OUTPATIENT
Start: 2025-02-28 | End: 2025-03-04

## 2025-02-28 RX ORDER — GABAPENTIN 400 MG/1
300 CAPSULE ORAL
Refills: 0 | Status: DISCONTINUED | OUTPATIENT
Start: 2025-02-28 | End: 2025-03-04

## 2025-02-28 RX ADMIN — GABAPENTIN 300 MILLIGRAM(S): 400 CAPSULE ORAL at 22:12

## 2025-02-28 RX ADMIN — OXYCODONE HYDROCHLORIDE 5 MILLIGRAM(S): 30 TABLET ORAL at 03:15

## 2025-02-28 RX ADMIN — TRAMADOL HYDROCHLORIDE 25 MILLIGRAM(S): 50 TABLET, FILM COATED ORAL at 22:11

## 2025-02-28 RX ADMIN — TRAMADOL HYDROCHLORIDE 25 MILLIGRAM(S): 50 TABLET, FILM COATED ORAL at 22:41

## 2025-02-28 RX ADMIN — Medication 75 MILLILITER(S): at 08:32

## 2025-02-28 RX ADMIN — Medication 250 MILLILITER(S): at 08:32

## 2025-02-28 RX ADMIN — Medication 975 MILLIGRAM(S): at 11:31

## 2025-02-28 RX ADMIN — ATORVASTATIN CALCIUM 20 MILLIGRAM(S): 80 TABLET, FILM COATED ORAL at 22:12

## 2025-02-28 RX ADMIN — Medication 975 MILLIGRAM(S): at 23:48

## 2025-02-28 RX ADMIN — Medication 100 MICROGRAM(S): at 08:26

## 2025-02-28 RX ADMIN — Medication 81 MILLIGRAM(S): at 08:32

## 2025-02-28 RX ADMIN — CARVEDILOL 3.12 MILLIGRAM(S): 3.12 TABLET, FILM COATED ORAL at 22:12

## 2025-02-28 RX ADMIN — Medication 975 MILLIGRAM(S): at 17:46

## 2025-02-28 RX ADMIN — OXYCODONE HYDROCHLORIDE 5 MILLIGRAM(S): 30 TABLET ORAL at 02:45

## 2025-02-28 RX ADMIN — APIXABAN 2.5 MILLIGRAM(S): 2.5 TABLET, FILM COATED ORAL at 22:12

## 2025-02-28 NOTE — CONSULT NOTE ADULT - SUBJECTIVE AND OBJECTIVE BOX
HPI:  CC: Patient is a 89y old  Female who presents with a chief complaint of s/p Fall on 2/24.  Incidental diminutive Pancreatic Cyst on CT    Time Notified: 1400   Time Seen: 1405    HPI:  88y/o F with PMHx of CAD, HTN, HLD, hypothyroidism, and Waldenstrom's disease who was BIBEMS  to  ED with son at bedside c/o left leg weakness and left upper leg pain s/p mechanical fall three days ago (no HS / LOC per pt). Pt is now requiring assistance with ambulation (normally ambulatory at baseline). P reports having  portable x-ray done at home on Tuesday which showed a fracture in her left arm but was negative for any findings in her hip/legs. Pt f/u outpt with orthopedic surgeon (Dr Yang) yesterday and Lt arm was placed in a splint. ER diagnostic imaging acute/subacute left pubic fractures as discussed, age indeterminate severe  T9 and T10 compression deformities, and 9 mm pancreatic body cystic lesion.  Per chart review; + APT (ASA 81), No AC therapy    Subjective:  Pt seen and examined at bedside with chaperone (son). Pt is AAOx3, no acute distress. Endorsing Lt groin pain with active LLE ROM. Pt denied c/o fever, chills, chest pain, SOB, abd pain, N/V/D, extremity dysfunction, hemoptysis, hematemesis, hematuria, hematochezia, headache, diplopia, vertigo, dizziness.    ROS: as above otherwise negative    PMH:  CAD, HTN, HLD, hypothyroidism, and Waldenstrom's disease  PSH: S/P hysterectomy.     No Known Allergies    SH: Denies toxic habits.  FH: No pertinent FMHx     Vital Signs Last 24 Hrs  T(C): 37.1 (27 Feb 2025 11:10), Max: 37.1 (27 Feb 2025 11:10)  T(F): 98.8 (27 Feb 2025 11:10), Max: 98.8 (27 Feb 2025 11:10)  HR: 64 (27 Feb 2025 10:10) (64 - 64)  BP: 103/52 (27 Feb 2025 10:10) (103/52 - 103/52)  BP(mean): --  RR: 16 (27 Feb 2025 10:10) (16 - 16)  SpO2: 96% (27 Feb 2025 10:10) (96% - 96%)    Parameters below as of 27 Feb 2025 10:10  Patient On (Oxygen Delivery Method): room air        Labs:               10.2   10.51 )-----------( 194      ( 27 Feb 2025 11:01 )             32.1     CBC Full  -  ( 27 Feb 2025 11:01 )  WBC Count : 10.51 K/uL  RBC Count : 3.20 M/uL  Hemoglobin : 10.2 g/dL  Hematocrit : 32.1 %  Platelet Count - Automated : 194 K/uL  Mean Cell Volume : 100.3 fl  Mean Cell Hemoglobin : 31.9 pg  Mean Cell Hemoglobin Concentration : 31.8 g/dL  Auto Neutrophil # : 7.28 K/uL  Auto Lymphocyte # : 1.71 K/uL  Auto Monocyte # : 1.41 K/uL  Auto Eosinophil # : 0.06 K/uL  Auto Basophil # : 0.02 K/uL  Auto Neutrophil % : 69.2 %  Auto Lymphocyte % : 16.3 %  Auto Monocyte % : 13.4 %  Auto Eosinophil % : 0.6 %  Auto Basophil % : 0.2 %    02-27    137  |  110[H]  |  74[H]  ----------------------------<  173[H]  5.0   |  23  |  1.51[H]    Ca    10.0      27 Feb 2025 11:01  Mg     2.7     02-27    TPro  7.0  /  Alb  3.2[L]  /  TBili  1.0  /  DBili  x   /  AST  35  /  ALT  27  /  AlkPhos  65  02-27    LIVER FUNCTIONS - ( 27 Feb 2025 11:01 )  Alb: 3.2 g/dL / Pro: 7.0 gm/dL / ALK PHOS: 65 U/L / ALT: 27 U/L / AST: 35 U/L / GGT: x             Meds:  acetaminophen     Tablet .. 975 milliGRAM(s) Oral every 6 hours  enoxaparin Injectable 30 milliGRAM(s) SubCutaneous every 24 hours  morphine  - Injectable 2 milliGRAM(s) IV Push every 4 hours PRN  ondansetron Injectable 4 milliGRAM(s) IV Push every 6 hours PRN  oxyCODONE    IR 2.5 milliGRAM(s) Oral every 4 hours PRN  oxyCODONE    IR 5 milliGRAM(s) Oral every 4 hours PRN  pantoprazole    Tablet 40 milliGRAM(s) Oral before breakfast  senna 2 Tablet(s) Oral at bedtime PRN  sodium chloride 0.9%. 1000 milliLiter(s) IV Continuous <Continuous>      Radiology:  < from: CT Abdomen and Pelvis No Cont (02.27.25 @ 12:43) >  IMPRESSION:  Acute/subacute left pubic fractures as discussed.  Age indeterminate severe  T9 and T10 compression deformities, new from   8/13/2022  Otherwise no acute sequelae of trauma on this noncontrast study    9 mm pancreatic body cystic lesion. Recommend pancreatic protocol MR for   additional characterization    Additional findings as discussed    --- End of Report ---    < end of copied text >    PRIMARY SURVEY:   A - airway intact  B - bilateral breath sounds and good chest rise  C - initial BP stable , palpable pulses in all extremities  D - GCS 15 on arrival. E 4, V 5, M 6.   Exposure obtained    SECONDARY SURVEY:   GCS of 15  Airway is patent  Breathing is symmetric and unlabored  Neuro: CNII-XII grossly intact  Psych: normal affect  HEENT: Normocephalic, atraumatic, SHANNAN, EOM wnl, no otorrhea or hemotympanum b/l, no epistaxis or d/c b/l nares, no craniofacial bony pathology or tenderness b/l  Neck: No crepitus, no ecchymosis, no hematoma, to exam, no JVD, no tracheal deviation  Cspine/thoracolumbrosacral spine: +mild midline tenderness over thoracic spine. No gross bony pathology or tenderness to exam  Cardiovascular: S1S2 Present  Chest: no gross rib pathology or tenderness to exam. No sternal pathology or tenderness to exam. No crepitus, no ecchymosis, no hematoma. No penetrating thorcoabdominal trauma  Respiratory: Respiratory Effort normal; no wheezes, rales or rhonchi to exam  ABD: bowel sounds (+), soft, nontender, non distended, no rebound, no guarding, no rigidity, no skin changes to exam. No pelvic instability to exam, no skin changes  Musculoskeletal: +TTP over Lt lateral groin region. Pt has palpable b/l radial, femoral, dorsalis pedis pulses. All digits are warm and well perfused. No gross long bone pathology or tenderness to exam. Pt demonstrates grossly intact sensoromotor function. Pt has good capillary refill to digits, no calf edema or tenderness to exam.  Skin: no lesions or rashes to exam       (27 Feb 2025 14:34)      PAST MEDICAL & SURGICAL HISTORY:  Hypertension      Waldenstrom's disease      HLD (hyperlipidemia)      CAD (coronary artery disease)      Hypothyroidism      S/P hysterectomy          MEDICATIONS  (STANDING):  acetaminophen     Tablet .. 975 milliGRAM(s) Oral every 6 hours  aspirin enteric coated 81 milliGRAM(s) Oral daily  atorvastatin 20 milliGRAM(s) Oral at bedtime  Calquence (Acalabrutinib) 100mg 1 Tablet(s)   Oral daily  carvedilol 3.125 milliGRAM(s) Oral every 12 hours  enoxaparin Injectable 30 milliGRAM(s) SubCutaneous every 24 hours  gabapentin 300 milliGRAM(s) Oral two times a day  levothyroxine 100 MICROGram(s) Oral daily  pantoprazole    Tablet 40 milliGRAM(s) Oral before breakfast  sodium chloride 0.9%. 1000 milliLiter(s) (75 mL/Hr) IV Continuous <Continuous>    MEDICATIONS  (PRN):  albuterol    90 MICROgram(s) HFA Inhaler 1 Puff(s) Inhalation every 6 hours PRN Shortness of Breath and/or Wheezing  morphine  - Injectable 2 milliGRAM(s) IV Push every 4 hours PRN brekathrough pain  ondansetron Injectable 4 milliGRAM(s) IV Push every 6 hours PRN Nausea  oxyCODONE    IR 2.5 milliGRAM(s) Oral every 4 hours PRN Moderate Pain (4 - 6)  oxyCODONE    IR 5 milliGRAM(s) Oral every 4 hours PRN Severe Pain (7 - 10)  senna 2 Tablet(s) Oral at bedtime PRN Constipation      Allergies    No Known Allergies    Intolerances        SOCIAL HISTORY:    FAMILY HISTORY:  No pertinent family history in first degree relatives     Non-contributory    REVIEW OF SYSTEMS      General:	    Respiratory and Thorax:  	  Cardiovascular:	    Gastrointestinal:	    Musculoskeletal:	   Vital Signs Last 24 Hrs  T(C): 36.3 (28 Feb 2025 11:40), Max: 36.9 (28 Feb 2025 07:40)  T(F): 97.4 (28 Feb 2025 11:40), Max: 98.5 (28 Feb 2025 07:40)  HR: 70 (28 Feb 2025 11:40) (60 - 70)  BP: 114/46 (28 Feb 2025 11:40) (82/68 - 114/55)  BP(mean): 55 (27 Feb 2025 16:11) (55 - 55)  RR: 17 (28 Feb 2025 11:40) (17 - 19)  SpO2: 97% (28 Feb 2025 11:40) (95% - 97%)    Parameters below as of 28 Feb 2025 11:40  Patient On (Oxygen Delivery Method): room air        HEENT :No Pallor.No icterus. EOMI,PERLAA  Chest : Clear to Auscultation  CVS : S1S2 Normal.No murmurs.  Abdomen: Soft.Non tender .Normal bowel sounds.No Organomegaly.  CNS: Alert.Oriented to Time,Place and Person.No focal deficit.  EXT: Normal Range of motion.No pitting edema.    LABS:                        9.6    9.24  )-----------( 200      ( 28 Feb 2025 13:41 )             29.4     02-28    143  |  115[H]  |  58[H]  ----------------------------<  107[H]  4.4   |  23  |  0.98    Ca    9.2      28 Feb 2025 07:31  Phos  2.5     02-28  Mg     2.4     02-28    TPro  7.0  /  Alb  3.2[L]  /  TBili  1.0  /  DBili  x   /  AST  35  /  ALT  27  /  AlkPhos  65  02-27      LIVER FUNCTIONS - ( 27 Feb 2025 11:01 )  Alb: 3.2 g/dL / Pro: 7.0 gm/dL / ALK PHOS: 65 U/L / ALT: 27 U/L / AST: 35 U/L / GGT: x             RADIOLOGY & ADDITIONAL STUDIES:

## 2025-02-28 NOTE — CONSULT NOTE ADULT - SUBJECTIVE AND OBJECTIVE BOX
Patient is a 89y old  Female who presents with a chief complaint of Fall - Pelvic Fx (28 Feb 2025 10:45)      Triage time -   PA call for consult -   PA evaluation time -     HPI:  CC: Patient is a 89y old  Female who presents with a chief complaint of s/p Fall on 2/24.    Time Notified: 1400   Time Seen: 1405    HPI:  88y/o F with PMHx of CAD, HTN, HLD, hypothyroidism, and Waldenstrom's disease who was BIBEMS  to  ED with son at bedside c/o left leg weakness and left upper leg pain s/p mechanical fall three days ago (no HS / LOC per pt). Pt is now requiring assistance with ambulation (normally ambulatory at baseline). P reports having  portable x-ray done at home on Tuesday which showed a fracture in her left arm but was negative for any findings in her hip/legs. Pt f/u outpt with orthopedic surgeon (Dr Yang) yesterday and Lt arm was placed in a splint. ER diagnostic imaging acute/subacute left pubic fractures as discussed, age indeterminate severe  T9 and T10 compression deformities, and 9 mm pancreatic body cystic lesion.  Per chart review; + APT (ASA 81), No AC therapy    Subjective:  Pt seen and examined at bedside with chaperone (son). Pt is AAOx3, no acute distress. Endorsing Lt groin pain with active LLE ROM. Pt denied c/o fever, chills, chest pain, SOB, abd pain, N/V/D, extremity dysfunction, hemoptysis, hematemesis, hematuria, hematochezia, headache, diplopia, vertigo, dizziness.    ROS: as above otherwise negative    PMH:  CAD, HTN, HLD, hypothyroidism, and Waldenstrom's disease  PSH: S/P hysterectomy.     No Known Allergies    SH: Denies toxic habits.  FH: No pertinent FMHx     Vital Signs Last 24 Hrs  T(C): 37.1 (27 Feb 2025 11:10), Max: 37.1 (27 Feb 2025 11:10)  T(F): 98.8 (27 Feb 2025 11:10), Max: 98.8 (27 Feb 2025 11:10)  HR: 64 (27 Feb 2025 10:10) (64 - 64)  BP: 103/52 (27 Feb 2025 10:10) (103/52 - 103/52)  BP(mean): --  RR: 16 (27 Feb 2025 10:10) (16 - 16)  SpO2: 96% (27 Feb 2025 10:10) (96% - 96%)    Parameters below as of 27 Feb 2025 10:10  Patient On (Oxygen Delivery Method): room air        Labs:               10.2   10.51 )-----------( 194      ( 27 Feb 2025 11:01 )             32.1     CBC Full  -  ( 27 Feb 2025 11:01 )  WBC Count : 10.51 K/uL  RBC Count : 3.20 M/uL  Hemoglobin : 10.2 g/dL  Hematocrit : 32.1 %  Platelet Count - Automated : 194 K/uL  Mean Cell Volume : 100.3 fl  Mean Cell Hemoglobin : 31.9 pg  Mean Cell Hemoglobin Concentration : 31.8 g/dL  Auto Neutrophil # : 7.28 K/uL  Auto Lymphocyte # : 1.71 K/uL  Auto Monocyte # : 1.41 K/uL  Auto Eosinophil # : 0.06 K/uL  Auto Basophil # : 0.02 K/uL  Auto Neutrophil % : 69.2 %  Auto Lymphocyte % : 16.3 %  Auto Monocyte % : 13.4 %  Auto Eosinophil % : 0.6 %  Auto Basophil % : 0.2 %    02-27    137  |  110[H]  |  74[H]  ----------------------------<  173[H]  5.0   |  23  |  1.51[H]    Ca    10.0      27 Feb 2025 11:01  Mg     2.7     02-27    TPro  7.0  /  Alb  3.2[L]  /  TBili  1.0  /  DBili  x   /  AST  35  /  ALT  27  /  AlkPhos  65  02-27    LIVER FUNCTIONS - ( 27 Feb 2025 11:01 )  Alb: 3.2 g/dL / Pro: 7.0 gm/dL / ALK PHOS: 65 U/L / ALT: 27 U/L / AST: 35 U/L / GGT: x             Meds:  acetaminophen     Tablet .. 975 milliGRAM(s) Oral every 6 hours  enoxaparin Injectable 30 milliGRAM(s) SubCutaneous every 24 hours  morphine  - Injectable 2 milliGRAM(s) IV Push every 4 hours PRN  ondansetron Injectable 4 milliGRAM(s) IV Push every 6 hours PRN  oxyCODONE    IR 2.5 milliGRAM(s) Oral every 4 hours PRN  oxyCODONE    IR 5 milliGRAM(s) Oral every 4 hours PRN  pantoprazole    Tablet 40 milliGRAM(s) Oral before breakfast  senna 2 Tablet(s) Oral at bedtime PRN  sodium chloride 0.9%. 1000 milliLiter(s) IV Continuous <Continuous>      Radiology:  < from: CT Abdomen and Pelvis No Cont (02.27.25 @ 12:43) >  IMPRESSION:  Acute/subacute left pubic fractures as discussed.  Age indeterminate severe  T9 and T10 compression deformities, new from   8/13/2022  Otherwise no acute sequelae of trauma on this noncontrast study    9 mm pancreatic body cystic lesion. Recommend pancreatic protocol MR for   additional characterization    Additional findings as discussed    --- End of Report ---    < end of copied text >    PRIMARY SURVEY:   A - airway intact  B - bilateral breath sounds and good chest rise  C - initial BP stable , palpable pulses in all extremities  D - GCS 15 on arrival. E 4, V 5, M 6.   Exposure obtained    SECONDARY SURVEY:   GCS of 15  Airway is patent  Breathing is symmetric and unlabored  Neuro: CNII-XII grossly intact  Psych: normal affect  HEENT: Normocephalic, atraumatic, SHANNAN, EOM wnl, no otorrhea or hemotympanum b/l, no epistaxis or d/c b/l nares, no craniofacial bony pathology or tenderness b/l  Neck: No crepitus, no ecchymosis, no hematoma, to exam, no JVD, no tracheal deviation  Cspine/thoracolumbrosacral spine: +mild midline tenderness over thoracic spine. No gross bony pathology or tenderness to exam  Cardiovascular: S1S2 Present  Chest: no gross rib pathology or tenderness to exam. No sternal pathology or tenderness to exam. No crepitus, no ecchymosis, no hematoma. No penetrating thorcoabdominal trauma  Respiratory: Respiratory Effort normal; no wheezes, rales or rhonchi to exam  ABD: bowel sounds (+), soft, nontender, non distended, no rebound, no guarding, no rigidity, no skin changes to exam. No pelvic instability to exam, no skin changes  Musculoskeletal: +TTP over Lt lateral groin region. Pt has palpable b/l radial, femoral, dorsalis pedis pulses. All digits are warm and well perfused. No gross long bone pathology or tenderness to exam. Pt demonstrates grossly intact sensoromotor function. Pt has good capillary refill to digits, no calf edema or tenderness to exam.  Skin: no lesions or rashes to exam       (27 Feb 2025 14:34)      PAST MEDICAL & SURGICAL HISTORY:  Hypertension      Waldenstrom's disease      HLD (hyperlipidemia)      CAD (coronary artery disease)      Hypothyroidism      S/P hysterectomy          FAMILY HISTORY:  No pertinent family history in first degree relatives       Noncontributory     Social Hx:  Nonsmoker, no drug or alcohol use    Allergies    No Known Allergies    Intolerances        MEDICATIONS  (STANDING):  acetaminophen     Tablet .. 975 milliGRAM(s) Oral every 6 hours  aspirin enteric coated 81 milliGRAM(s) Oral daily  atorvastatin 20 milliGRAM(s) Oral at bedtime  Calquence (Acalabrutinib) 100mg 1 Tablet(s)   Oral daily  carvedilol 3.125 milliGRAM(s) Oral every 12 hours  enoxaparin Injectable 30 milliGRAM(s) SubCutaneous every 24 hours  gabapentin 300 milliGRAM(s) Oral two times a day  levothyroxine 100 MICROGram(s) Oral daily  pantoprazole    Tablet 40 milliGRAM(s) Oral before breakfast  sodium chloride 0.9%. 1000 milliLiter(s) (75 mL/Hr) IV Continuous <Continuous>       ROS: Pertinent positives in HPI, all other ROS were reviewed and are negative.      Vital Signs Last 24 Hrs  T(C): 36.3 (28 Feb 2025 11:40), Max: 36.9 (28 Feb 2025 07:40)  T(F): 97.4 (28 Feb 2025 11:40), Max: 98.5 (28 Feb 2025 07:40)  HR: 70 (28 Feb 2025 11:40) (60 - 70)  BP: 114/46 (28 Feb 2025 11:40) (82/68 - 114/55)  BP(mean): 55 (27 Feb 2025 16:11) (55 - 55)  RR: 17 (28 Feb 2025 11:40) (17 - 19)  SpO2: 97% (28 Feb 2025 11:40) (95% - 97%)    Parameters below as of 28 Feb 2025 11:40  Patient On (Oxygen Delivery Method): room air        Physical Exam:  Constitutional: Awake / alert  HEENT: PERRLA, EOMI  Neck: Supple  Respiratory: Breath sounds are clear bilaterally  Cardiovascular: S1 and S2, regular rhythm  Gastrointestinal: Soft, NT/ND  Extremities:  no edema  Musculoskeletal: no abnormal movements  Skin: No rashes    Neurological Exam:  HF: A x O x 3, follows commands, normal affect, speech fluent  CN: PERRL, EOMI, no NLFD, tongue midline  Motor: Strength 5/5 in all 4 ext, normal bulk and tone  Sens: Intact to light touch  Reflexes: Symmetric and normal, no clonus, no Carey's   Coord:  No FNFA, dysmetria, ZAIDA intact   Gait/Balance: Cannot test    Labs:                        9.6    9.24  )-----------( 200      ( 28 Feb 2025 13:41 )             29.4     02-28    143  |  115[H]  |  58[H]  ----------------------------<  107[H]  4.4   |  23  |  0.98    Ca    9.2      28 Feb 2025 07:31  Phos  2.5     02-28  Mg     2.4     02-28    TPro  7.0  /  Alb  3.2[L]  /  TBili  1.0  /  DBili  x   /  AST  35  /  ALT  27  /  AlkPhos  65  02-27            Radiology:          A/P:      Time spent:   minutes in examination of patient, review of labs and imaging, and coordination with contributing physicians   Patient is a 89y old  Female who presents with a chief complaint of Fall - Pelvic Fx (28 Feb 2025 10:45)      Triage time - 2/27 10:10a  PA call for consult - 2/28 8am  PA evaluation time - 2/28 9am    89y/o F with PMHx of CAD, HTN, HLD, hypothyroidism, and Waldenstrom's disease who was BIBEMS  to  ED with son at bedside c/o left leg weakness and left upper leg pain s/p mechanical fall three days ago (no HS / LOC per pt). Pt is now requiring assistance with ambulation (normally ambulatory at baseline). P reports having  portable x-ray done at home on Tuesday which showed a fracture in her left arm but was negative for any findings in her hip/legs. Pt f/u outpt with orthopedic surgeon (Dr Yang) yesterday and Lt arm was placed in a splint. ER diagnostic imaging acute/subacute left pubic fractures as discussed, age indeterminate severe  T9 and T10 compression deformities, and 9 mm pancreatic body cystic lesion.  Per chart review; + APT (ASA 81), No AC therapy    Neurosurgery consulted for Thoracic compression fractures noted on CT abdomen.  Patient doesn't endorse any back pain.  Sees Dr Feldman for compression fractures. Has TSLO    PAST MEDICAL & SURGICAL HISTORY:  Hypertension  Waldenstrom's disease  HLD (hyperlipidemia)  CAD (coronary artery disease)  Hypothyroidism  S/P hysterectomy      FAMILY HISTORY:  No pertinent family history in first degree relatives     Noncontributory     Social Hx:  Nonsmoker, no drug or alcohol use    Allergies  No Known Allergies  Intolerances      MEDICATIONS  (STANDING):  acetaminophen     Tablet .. 975 milliGRAM(s) Oral every 6 hours  aspirin enteric coated 81 milliGRAM(s) Oral daily  atorvastatin 20 milliGRAM(s) Oral at bedtime  Calquence (Acalabrutinib) 100mg 1 Tablet(s)   Oral daily  carvedilol 3.125 milliGRAM(s) Oral every 12 hours  enoxaparin Injectable 30 milliGRAM(s) SubCutaneous every 24 hours  gabapentin 300 milliGRAM(s) Oral two times a day  levothyroxine 100 MICROGram(s) Oral daily  pantoprazole    Tablet 40 milliGRAM(s) Oral before breakfast  sodium chloride 0.9%. 1000 milliLiter(s) (75 mL/Hr) IV Continuous <Continuous>     ROS: Pertinent positives in HPI, all other ROS were reviewed and are negative.      Vital Signs Last 24 Hrs  T(C): 36.3 (28 Feb 2025 11:40), Max: 36.9 (28 Feb 2025 07:40)  T(F): 97.4 (28 Feb 2025 11:40), Max: 98.5 (28 Feb 2025 07:40)  HR: 70 (28 Feb 2025 11:40) (60 - 70)  BP: 114/46 (28 Feb 2025 11:40) (82/68 - 114/55)  BP(mean): 55 (27 Feb 2025 16:11) (55 - 55)  RR: 17 (28 Feb 2025 11:40) (17 - 19)  SpO2: 97% (28 Feb 2025 11:40) (95% - 97%)    Parameters below as of 28 Feb 2025 11:40  Patient On (Oxygen Delivery Method): room air    Physical Exam:  Constitutional: Awake / alert  HEENT: PERRLA, EOMI  Neck: Supple  Respiratory: Breath sounds are clear bilaterally  Cardiovascular: S1 and S2, regular rhythm  Gastrointestinal: Soft, NT/ND  Extremities:  no edema  Musculoskeletal: no abnormal movements  Skin: No rashes    Neurological Exam:  HF: A x O x 3, follows commands, normal affect, speech fluent  CN: PERRL, EOMI, no NLFD, tongue midline  Motor: Strength 5/5 x3 LLE 4/5 prox pain limited  Sens: Intact to light touch  Reflexes: Symmetric and normal, no clonus, no Carey's   Coord:  No FNFA, dysmetria, ZAIDA intact   Gait/Balance: Cannot test    Labs:                        9.6    9.24  )-----------( 200      ( 28 Feb 2025 13:41 )             29.4     02-28    143  |  115[H]  |  58[H]  ----------------------------<  107[H]  4.4   |  23  |  0.98    Ca    9.2      28 Feb 2025 07:31  Phos  2.5     02-28  Mg     2.4     02-28    TPro  7.0  /  Alb  3.2[L]  /  TBili  1.0  /  DBili  x   /  AST  35  /  ALT  27  /  AlkPhos  65  02-27      Radiology:    < from: CT Abdomen and Pelvis No Cont (02.27.25 @ 12:43) >  IMPRESSION:  Acute/subacute left pubic fractures as discussed.  Age indeterminate severe  T9 and T10 compression deformities, new from   8/13/2022  Otherwise no acute sequelae of trauma on this noncontrast study    9 mm pancreatic body cystic lesion. Recommend pancreatic protocol MR for   additional characterization    Additional findings as discussed    < end of copied text >

## 2025-02-28 NOTE — CONSULT NOTE ADULT - SUBJECTIVE AND OBJECTIVE BOX
CHIEF COMPLAINT: Patient is a 89y old  Female who presents with a chief complaint of Fall - Pelvic Fx (27 Feb 2025 17:55)      HPI:  CC: Patient is a 89y old  Female who presents with a chief complaint of s/p Fall on 2/24.    Time Notified: 1400   Time Seen: 1405    HPI:  88y/o F with PMHx of CAD, HTN, HLD, hypothyroidism, and Waldenstrom's disease who was BIBEMS  to  ED with son at bedside c/o left leg weakness and left upper leg pain s/p mechanical fall three days ago (no HS / LOC per pt). Pt is now requiring assistance with ambulation (normally ambulatory at baseline). P reports having  portable x-ray done at home on Tuesday which showed a fracture in her left arm but was negative for any findings in her hip/legs. Pt f/u outpt with orthopedic surgeon (Dr Yang) yesterday and Lt arm was placed in a splint. ER diagnostic imaging acute/subacute left pubic fractures as discussed, age indeterminate severe  T9 and T10 compression deformities, and 9 mm pancreatic body cystic lesion.  Per chart review; + APT (ASA 81), No AC therapy    Subjective:  Pt seen and examined at bedside with chaperone (son). Pt is AAOx3, no acute distress. Endorsing Lt groin pain with active LLE ROM. Pt denied c/o fever, chills, chest pain, SOB, abd pain, N/V/D, extremity dysfunction, hemoptysis, hematemesis, hematuria, hematochezia, headache, diplopia, vertigo, dizziness.    ROS: as above otherwise negative    PMH:  CAD, HTN, HLD, hypothyroidism, and Waldenstrom's disease  PSH: S/P hysterectomy.     No Known Allergies    SH: Denies toxic habits.  FH: No pertinent FMHx     Vital Signs Last 24 Hrs  T(C): 37.1 (27 Feb 2025 11:10), Max: 37.1 (27 Feb 2025 11:10)  T(F): 98.8 (27 Feb 2025 11:10), Max: 98.8 (27 Feb 2025 11:10)  HR: 64 (27 Feb 2025 10:10) (64 - 64)  BP: 103/52 (27 Feb 2025 10:10) (103/52 - 103/52)  BP(mean): --  RR: 16 (27 Feb 2025 10:10) (16 - 16)  SpO2: 96% (27 Feb 2025 10:10) (96% - 96%)    Parameters below as of 27 Feb 2025 10:10  Patient On (Oxygen Delivery Method): room air        Labs:               10.2   10.51 )-----------( 194      ( 27 Feb 2025 11:01 )             32.1     CBC Full  -  ( 27 Feb 2025 11:01 )  WBC Count : 10.51 K/uL  RBC Count : 3.20 M/uL  Hemoglobin : 10.2 g/dL  Hematocrit : 32.1 %  Platelet Count - Automated : 194 K/uL  Mean Cell Volume : 100.3 fl  Mean Cell Hemoglobin : 31.9 pg  Mean Cell Hemoglobin Concentration : 31.8 g/dL  Auto Neutrophil # : 7.28 K/uL  Auto Lymphocyte # : 1.71 K/uL  Auto Monocyte # : 1.41 K/uL  Auto Eosinophil # : 0.06 K/uL  Auto Basophil # : 0.02 K/uL  Auto Neutrophil % : 69.2 %  Auto Lymphocyte % : 16.3 %  Auto Monocyte % : 13.4 %  Auto Eosinophil % : 0.6 %  Auto Basophil % : 0.2 %    02-27    137  |  110[H]  |  74[H]  ----------------------------<  173[H]  5.0   |  23  |  1.51[H]    Ca    10.0      27 Feb 2025 11:01  Mg     2.7     02-27    TPro  7.0  /  Alb  3.2[L]  /  TBili  1.0  /  DBili  x   /  AST  35  /  ALT  27  /  AlkPhos  65  02-27    LIVER FUNCTIONS - ( 27 Feb 2025 11:01 )  Alb: 3.2 g/dL / Pro: 7.0 gm/dL / ALK PHOS: 65 U/L / ALT: 27 U/L / AST: 35 U/L / GGT: x             Meds:  acetaminophen     Tablet .. 975 milliGRAM(s) Oral every 6 hours  enoxaparin Injectable 30 milliGRAM(s) SubCutaneous every 24 hours  morphine  - Injectable 2 milliGRAM(s) IV Push every 4 hours PRN  ondansetron Injectable 4 milliGRAM(s) IV Push every 6 hours PRN  oxyCODONE    IR 2.5 milliGRAM(s) Oral every 4 hours PRN  oxyCODONE    IR 5 milliGRAM(s) Oral every 4 hours PRN  pantoprazole    Tablet 40 milliGRAM(s) Oral before breakfast  senna 2 Tablet(s) Oral at bedtime PRN  sodium chloride 0.9%. 1000 milliLiter(s) IV Continuous <Continuous>      Radiology:  < from: CT Abdomen and Pelvis No Cont (02.27.25 @ 12:43) >  IMPRESSION:  Acute/subacute left pubic fractures as discussed.  Age indeterminate severe  T9 and T10 compression deformities, new from   8/13/2022  Otherwise no acute sequelae of trauma on this noncontrast study    9 mm pancreatic body cystic lesion. Recommend pancreatic protocol MR for   additional characterization    Additional findings as discussed    --- End of Report ---    < end of copied text >    PRIMARY SURVEY:   A - airway intact  B - bilateral breath sounds and good chest rise  C - initial BP stable , palpable pulses in all extremities  D - GCS 15 on arrival. E 4, V 5, M 6.   Exposure obtained    SECONDARY SURVEY:   GCS of 15  Airway is patent  Breathing is symmetric and unlabored  Neuro: CNII-XII grossly intact  Psych: normal affect  HEENT: Normocephalic, atraumatic, SHANNAN, EOM wnl, no otorrhea or hemotympanum b/l, no epistaxis or d/c b/l nares, no craniofacial bony pathology or tenderness b/l  Neck: No crepitus, no ecchymosis, no hematoma, to exam, no JVD, no tracheal deviation  Cspine/thoracolumbrosacral spine: +mild midline tenderness over thoracic spine. No gross bony pathology or tenderness to exam  Cardiovascular: S1S2 Present  Chest: no gross rib pathology or tenderness to exam. No sternal pathology or tenderness to exam. No crepitus, no ecchymosis, no hematoma. No penetrating thorcoabdominal trauma  Respiratory: Respiratory Effort normal; no wheezes, rales or rhonchi to exam  ABD: bowel sounds (+), soft, nontender, non distended, no rebound, no guarding, no rigidity, no skin changes to exam. No pelvic instability to exam, no skin changes  Musculoskeletal: +TTP over Lt lateral groin region. Pt has palpable b/l radial, femoral, dorsalis pedis pulses. All digits are warm and well perfused. No gross long bone pathology or tenderness to exam. Pt demonstrates grossly intact sensoromotor function. Pt has good capillary refill to digits, no calf edema or tenderness to exam.  Skin: no lesions or rashes to exam       (27 Feb 2025 14:34)      PMHx: PAST MEDICAL & SURGICAL HISTORY:  Hypertension      Waldenstrom's disease      HLD (hyperlipidemia)      CAD (coronary artery disease)      Hypothyroidism      S/P hysterectomy            Soc Hx:       Allergies: Allergies    No Known Allergies    Intolerances          REVIEW OF SYSTEMS:    CONSTITUTIONAL: No weakness, fevers or chills  EYES/ENT: No visual changes;  No vertigo or throat pain   NECK: No pain or stiffness  RESPIRATORY: No cough, wheezing, hemoptysis; No shortness of breath  CARDIOVASCULAR: No chest pain or palpitations  GASTROINTESTINAL: No abdominal or epigastric pain. No nausea, vomiting, or hematemesis; No diarrhea or constipation. No melena or hematochezia.  GENITOURINARY: No dysuria, frequency or hematuria  NEUROLOGICAL: No numbness or weakness  SKIN: No itching, burning, rashes, or lesions   All other review of systems is negative unless indicated above    Vital Signs Last 24 Hrs  T(C): 36.8 (28 Feb 2025 01:47), Max: 37.1 (27 Feb 2025 11:10)  T(F): 98.3 (28 Feb 2025 01:47), Max: 98.8 (27 Feb 2025 11:10)  HR: 60 (28 Feb 2025 01:47) (60 - 70)  BP: 114/55 (28 Feb 2025 01:47) (88/50 - 114/55)  BP(mean): 55 (27 Feb 2025 16:11) (55 - 55)  RR: 17 (28 Feb 2025 01:47) (16 - 19)  SpO2: 95% (28 Feb 2025 01:47) (95% - 96%)    Parameters below as of 28 Feb 2025 01:47  Patient On (Oxygen Delivery Method): room air        I&O's Summary    27 Feb 2025 07:01  -  28 Feb 2025 06:52  --------------------------------------------------------  IN: 0 mL / OUT: 400 mL / NET: -400 mL            PHYSICAL EXAM:   Constitutional: unconfortable     Respiratory: Breath sounds are clear bilaterally, No wheezing, rales or rhonchi  Cardiovascular: S1 and S2, regular rate and rhythm, no Murmurs, gallops or rubs  Extremities: No peripheral edema  Vascular: 2+ peripheral pulses  Neurological: A/O x 3, no focal deficits      MEDICATIONS:  MEDICATIONS  (STANDING):  acetaminophen     Tablet .. 975 milliGRAM(s) Oral every 6 hours  aspirin enteric coated 81 milliGRAM(s) Oral daily  atorvastatin 20 milliGRAM(s) Oral at bedtime  Calquence (Acalabrutinib) 100mg 1 Tablet(s)   Oral daily  carvedilol 3.125 milliGRAM(s) Oral every 12 hours  enoxaparin Injectable 30 milliGRAM(s) SubCutaneous every 24 hours  levothyroxine 100 MICROGram(s) Oral daily  pantoprazole    Tablet 40 milliGRAM(s) Oral before breakfast  sodium chloride 0.9%. 1000 milliLiter(s) (75 mL/Hr) IV Continuous <Continuous>      LABS: All Labs Reviewed:                        10.2   10.51 )-----------( 194      ( 27 Feb 2025 11:01 )             32.1     02-27    137  |  110[H]  |  74[H]  ----------------------------<  173[H]  5.0   |  23  |  1.51[H]    Ca    10.0      27 Feb 2025 11:01  Mg     2.7     02-27    TPro  7.0  /  Alb  3.2[L]  /  TBili  1.0  /  DBili  x   /  AST  35  /  ALT  27  /  AlkPhos  65  02-27            Ekg--- < from: 12 Lead ECG (02.27.25 @ 10:54) >    Diagnosis Line Ventricular-paced rhythm  Biventricular pacemaker detected  Abnormal ECG  When compared with ECG of 17-AUG-2022 07:10,  Vent. rate has decreased BY   9 BPM  Confirmed by Palla MD, Bob ([id number]) on 2/27/2025 10:11:05 PM    < end of copied text >      ECHO:< from: TTE Echo Complete w/o Contrast w/ Doppler (08.15.22 @ 08:12) >  VSP:81 mmHg     Findings     Mitral Valve   The mitral valve leaflets appear thickened.   Moderate (2+) mitral regurgitation is present.     Aortic Valve   The aortic valve appears mildly calcified. Valve opening seems winsome   restricted.   There are fibrocalcific changes noted to the aortic valve leaflets with   restriction in leaflet excursion. Transaortic gradients are   underestimated due to impaired left ventricle systolic function. Mild to   moderate aortic stenosis is present.   Mild aortic insufficiency noted.     Tricuspid Valve   The tricuspid valve leaflets appear mildly thickened and/or calcified,   but   open well.   moderate to severe (3+) eccentric tricuspid valve regurgitation is   present.   Severe pulmonary hypertension.     Pulmonic Valve   Normal appearing pulmonic valve structure.   Mild pulmonic valvular regurgitation (1+) is present.     Left Atrium   The left atrium is mildly dilated.     Left Ventricle   Severe hypokinesis of the anteroseptal and inferoseptal walls.   Hypokinesis of the anterior wall is noted.   The apex is severely hypokinetic with sparing of the inferior wall.   Estimated left ventricular ejection fraction is about 40 %.     Right Atrium   The right atrium appears mildly dilated.     Right Ventricle   The right ventricle is mildly dilated.     Pericardial Effusion   No evidence of pericardial effusion.     Pleural Effusion   Pleural effusion - is present.     Miscellaneous   IVC is dilated and not collapsing with inspiration.     Impression     Summary     The mitral valve leaflets appear thickened.   Moderate (2+) mitral regurgitation is present.   The aortic valve appears mildly calcified. Valve opening seems to be   restricted.   There are fibrocalcific changes noted to the aortic valve leaflets with   restriction in leaflet excursion. Transaortic gradients are   underestimated due to impaired left ventricle systolic function. Mild to   moderate aortic stenosis is present.   Mild aortic insufficiency noted.   The tricuspid valve leaflets appear mildly thickened and/or calcified,   but   open well.   moderate to severe (3+) eccentric tricuspid valve regurgitation is   present.   Severe pulmonary hypertension.   Normal appearing pulmonic valve structure.   Mild pulmonic valvular regurgitation (1+) is present.   The left atrium is mildly dilated.   Severe hypokinesis of the anteroseptal and inferoseptal walls.   Hypokinesis of the anterior wall is noted.   The apex is severely hypokinetic withsparing of the inferior wall.   Estimated left ventricular ejection fraction is about 40 %.   The right atrium appears mildly dilated.   The right ventricle is mildly dilated.   IVC is dilated and not collapsing with inspiration.   Pleural effusion -is present.     Signature     ----------------------------------------------------------------   Electronically signed by GUSTABO MistryInterpreting   physician) on 08/15/2022 09:03 AM   ----------------------------------------------------------------    < end of copied text >

## 2025-02-28 NOTE — CONSULT NOTE ADULT - ASSESSMENT
89 F with CAD, CHF presents  s/p fall with olecranon and pubic rami fracture-- no surgical interventions    pAF-- was on low dose Eliquis as an out patient- would resume if Hb stable    CAD- non obtrusive recommend resuming home asa 81 and lipitor    CHF- check BNP-  on coreg, entresto and farxiga as an outpatient- on hold due to current hypotension can resume as an outpatient     Pain control,  PT vs rehab       DC planning

## 2025-02-28 NOTE — CONSULT NOTE ADULT - ASSESSMENT
Incidental Diminutuve Panceatic Cyst  Advanced Age  REC  The Pancreatic cyst can be followed up in an Out Patient setting.

## 2025-02-28 NOTE — PROGRESS NOTE ADULT - ASSESSMENT
88y/o F with PMHx of CAD, HTN, HLD, CHF, hypothyroidism, and Waldenstrom's disease  admitted for:    *S/p recent fall resulted in Multiple Fx, unable to ambulate   Acute/ subacute L pubic rami FX, T9-10  compression FX    L olecranon FX - Ortho hand following   management as per Trauma SX   LUE NWB  Control pain  Spine eval  ortho  records noted, no need in LUE XR as per DR Yang, c/w posterior splint   PT     *Hypotension, decreased UO- improved   Likely 2/2 hypovolemia and dehydration Pt is on 3 diuretics at home, last added about 1 week ago   Hold diuretics for now   s/p NS bolus 500ml ordered by primary team  Bladder scan to r/o retention   cardiology consult appreciated         *Elevated BUN/CR,  prerenal due to overdiuresis and dehydration - resolved iwth IVF hydration   IVF ordered  Hold diuretics for now  CT abd/pelvis with no urinary  obstruction   Check UA   Bladder scan   creatinine normalized   Avoid nephrotoxic meds       *H/o CAD, HTN, HLD, Chronic HFrEF   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 Chronic Anemia  Monitor H/H, baseline HB 9-10  On Calquence outPt, Pt has medication with her   d/w Pharmacist needs heme/onc approval  Consult for Dr Esparza placed     *Hypothyroidism  C/w Levothyroxine     *DVT PPX: Lovenox       Thank you for consult will follow. plan d/w patient and family at the bedside. Pending PT consult for dispo planning         88y/o F with PMHx of CAD, HTN, HLD, CHF, hypothyroidism, and Waldenstrom's disease  admitted for:    *S/p recent fall resulted in Multiple Fx, unable to ambulate   Acute/ subacute L pubic rami FX, T9-10  compression FX    L olecranon FX - Ortho hand following   management as per Trauma SX   LUE NWB  Control pain  Spine eval  ortho  records noted, no need in LUE XR as per DR Yang, c/w posterior splint   PT     *Hypotension, decreased UO- improved   Likely 2/2 hypovolemia and dehydration Pt is on 3 diuretics at home, last added about 1 week ago   Hold diuretics for now   s/p NS bolus 500ml ordered by primary team  Bladder scan to r/o retention   cardiology consult appreciated         *Elevated BUN/CR,  prerenal due to overdiuresis and dehydration - resolved iwth IVF hydration   IVF ordered  Hold diuretics for now  CT abd/pelvis with no urinary  obstruction   Check UA   Bladder scan   creatinine normalized   Avoid nephrotoxic meds       *H/o CAD, HTN, HLD, Chronic HFrEF   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 Chronic Anemia  Monitor H/H, baseline HB 9-10  On Calquence outPt, Pt has medication with her   d/w Pharmacist needs heme/onc approval  Consult for Dr Esparza placed     *Hypothyroidism  C/w Levothyroxine     Incidental finding of 9mm pancreatic body cystic lesion - recommend f/u outpatient with PCP.     *DVT PPX: Lovenox       Thank you for consult will follow. plan d/w patient and family at the bedside. Pending PT consult for dispo planning

## 2025-02-28 NOTE — PHYSICAL THERAPY INITIAL EVALUATION ADULT - NSPTDMEREC_GEN_A_CORE
The pt will benefit from the use of a RW to aide in Mobility Related ADLS due to having a dx of s/p fall with multiple fractures/ impaired gait/rolling walker

## 2025-02-28 NOTE — PROGRESS NOTE ADULT - SUBJECTIVE AND OBJECTIVE BOX
CC: Patient is a 89y old  Female who presents with a chief complaint of Fall - Pelvic Fx (28 Feb 2025 06:51)    Tertiary Exam  overnight: BP improved and then again hypotensive this morning  pt c/o feeling stiff, wants to mobilize, awaiting PT eval  otherwise has left elbow and groin pain, but asymptomatic for BP  states her mouth is dry  giving another 500 cc NS bolus, will repeat BP  NTD as per ortho for elbow or pelvic fx's  cardio holding anti hypertensives, medicine is following as well    Subjective:  Pt seen and examined at bedside with chaperone. Pt is AAOx3, pt in no acute distress. Pt denied c/o fever, chills, chest pain, SOB, abd pain, N/V/D, hemoptysis, hematemesis, hematuria, hematochezia headache, diplopia, vertigo, dizziness Pt tolerating diet, (+) void, (+) bowel function    ROS:  otherwise as abovementioned ROS    Vital Signs Last 24 Hrs  T(C): 36.9 (28 Feb 2025 07:40), Max: 37.1 (27 Feb 2025 11:10)  T(F): 98.5 (28 Feb 2025 07:40), Max: 98.8 (27 Feb 2025 11:10)  HR: 62 (28 Feb 2025 07:40) (60 - 70)  BP: 82/68 (28 Feb 2025 07:40) (82/68 - 114/55)  BP(mean): 55 (27 Feb 2025 16:11) (55 - 55)  RR: 17 (28 Feb 2025 07:40) (16 - 19)  SpO2: 95% (28 Feb 2025 07:40) (95% - 96%)    Parameters below as of 28 Feb 2025 07:40  Patient On (Oxygen Delivery Method): room air        Labs:                          9.4    7.84  )-----------( 182      ( 28 Feb 2025 07:31 )             29.6     CBC Full  -  ( 28 Feb 2025 07:31 )  WBC Count : 7.84 K/uL  RBC Count : 2.94 M/uL  Hemoglobin : 9.4 g/dL  Hematocrit : 29.6 %  Platelet Count - Automated : 182 K/uL  Mean Cell Volume : 100.7 fl  Mean Cell Hemoglobin : 32.0 pg  Mean Cell Hemoglobin Concentration : 31.8 g/dL  Auto Neutrophil # : x  Auto Lymphocyte # : x  Auto Monocyte # : x  Auto Eosinophil # : x  Auto Basophil # : x  Auto Neutrophil % : x  Auto Lymphocyte % : x  Auto Monocyte % : x  Auto Eosinophil % : x  Auto Basophil % : x    02-28    143  |  115[H]  |  58[H]  ----------------------------<  107[H]  4.4   |  23  |  0.98    Ca    9.2      28 Feb 2025 07:31  Phos  2.5     02-28  Mg     2.4     02-28    TPro  7.0  /  Alb  3.2[L]  /  TBili  1.0  /  DBili  x   /  AST  35  /  ALT  27  /  AlkPhos  65  02-27    LIVER FUNCTIONS - ( 27 Feb 2025 11:01 )  Alb: 3.2 g/dL / Pro: 7.0 gm/dL / ALK PHOS: 65 U/L / ALT: 27 U/L / AST: 35 U/L / GGT: x                 Meds:  acetaminophen     Tablet .. 975 milliGRAM(s) Oral every 6 hours  albuterol    90 MICROgram(s) HFA Inhaler 1 Puff(s) Inhalation every 6 hours PRN  aspirin enteric coated 81 milliGRAM(s) Oral daily  atorvastatin 20 milliGRAM(s) Oral at bedtime  Calquence (Acalabrutinib) 100mg 1 Tablet(s)   Oral daily  carvedilol 3.125 milliGRAM(s) Oral every 12 hours  enoxaparin Injectable 30 milliGRAM(s) SubCutaneous every 24 hours  levothyroxine 100 MICROGram(s) Oral daily  morphine  - Injectable 2 milliGRAM(s) IV Push every 4 hours PRN  ondansetron Injectable 4 milliGRAM(s) IV Push every 6 hours PRN  oxyCODONE    IR 2.5 milliGRAM(s) Oral every 4 hours PRN  oxyCODONE    IR 5 milliGRAM(s) Oral every 4 hours PRN  pantoprazole    Tablet 40 milliGRAM(s) Oral before breakfast  senna 2 Tablet(s) Oral at bedtime PRN  sodium chloride 0.9%. 1000 milliLiter(s) IV Continuous <Continuous>      Radiology:      Physical exam:  GCS of 15  Pt is aaox3  Pt in no acute distress  Airway is patent  Breathing is symmetric and unlabored  Neuro: CN II-XII grossly intact  Psych: normal affect  HEENT: normocephalic, SHANNAN, EOM wnl, no gross craniofacial bony pathology to exam  Neck: No tracheal deviation, no JVD, no crepitus, no ecchymosis, no hematoma  Spine: +paraspinal thoracic spine ttp  Chest: No gross rib or sternal pathology or tenderness to exam, no crepitus, no ecchymosis, no hematoma  Resp: CTAB  CVS: S1S2(+)  ABD: bowel sounds (+), soft, nontender, non distended, no rebound, no guarding, no rigidity, no pelvic instability to exam  EXT: +TTP to left lateral groin, 3/5 strength to LLE with pain; no calf tenderness or edema to exam b/l, pt has good capillary refill in all digits. Sensoromotor function grossly intact, on VTE prophylaxis  Skin: no adverse skin changes to exam

## 2025-02-28 NOTE — PROGRESS NOTE ADULT - ASSESSMENT
89y/o F with PMHx of CAD, HTN, HLD, hypothyroidism, and Waldenstrom's disease who was BIBEMS  to  ED with son at bedside c/o left leg weakness and left upper leg pain s/p mechanical fall three days ago - 2/24/24 (no HS / LOC per pt). Pt is now requiring assistance with ambulation (normally ambulatory at baseline). P reports having  portable x-ray done at home on Tuesday which showed a fracture in her left arm but was negative for any findings in her hip/legs. Pt f/u outpt with orthopedic surgeon (Dr Yang) yesterday and Lt arm was placed in a splint. ER diagnostic imaging acute/subacute left pubic fractures as discussed, age indeterminate severe  T9 and T10 compression deformities, and 9 mm pancreatic body cystic lesion.  Per chart review; + APT (ASA 81), No AC therapy    #Mechanical Fall 2/24  #Lt Elbow Fracture  #Lt Pelvic Fracture  #Age indtm T9 & T10 Compression Deformity  #Pancreatic Body Cystic Lesion  #BONNIE (Baseline Cr ~0.7)  #Leukocytosis  #CAD  #HTN  #Hypothyroidism  #Waldenstrom's disease    Plan:  Admit to Tele Floor under care of Trauma Surgery  Fall sustained 2/24/2024 without repeat trauma  -Repeat Xray LUE to eval extent of injury - not necessary as per ortho (done recently outpt)  -PRN analgesic and antiemetic therapy  -PT eval     Leukocytosis - likely reactive; resolved.  Monitor I/O's, UO. Replete lytes PRN  Cont IVF hydration   Avoid nephrotoxic medication mgmt   Holding Lasix and Entresto in setting of acute BONNIE    Consultations;  -Hospitalist: Co-mgmt and further clinical optimization.  -Orthopedic surgery: no surgical intervention, WBAT LLE, NWB LUE  -Neurosx/spine consult: pending eval of thoracic spine compression fractures  -GI: Eval pancreatic cystic lesion and need for further diagnostic imaging/interventions (pending)  -Cardiology consult: cont low dose eliquis if hgb stable, resume ASA 81 and lipitor, hold coreg/farxiga/entresto due to hypotension (can resume outpt)  -Heme/onc consult: pending for continuation of outpt medications for Waldsetorm disease    Cont DVT ppx (VTE and Lovenox). GI ppx   Cont IS and deep breathing exercises  Cont diet as tolerated   Cont appropriate home medication mgmt  SW and CM to eval for disp planning     Case and plan discussed with surgical attending

## 2025-02-28 NOTE — PHYSICAL THERAPY INITIAL EVALUATION ADULT - DIAGNOSIS, PT EVAL
89 y.o. female s/p mechanical fall on 2/24- + Left Elbow Fx, + Left Pelvic Fx (Acute/subacute Pubic Rami), Age Indeterminate T9-T10 Compression Deformity

## 2025-02-28 NOTE — CONSULT NOTE ADULT - ASSESSMENT
88y/o F with PMHx of CAD, HTN, HLD, hypothyroidism, and Waldenstrom's disease who was BIBEMS  to  ED with son at bedside c/o left leg weakness and left upper leg pain s/p mechanical fall three days ago (no HS / LOC per pt). Pt is now requiring assistance with ambulation (normally ambulatory at baseline). P reports having  portable x-ray done at home on Tuesday which showed a fracture in her left arm but was negative for any findings in her hip/legs. Pt f/u outpt with orthopedic surgeon (Dr Yang) yesterday and Lt arm was placed in a splint. ER diagnostic imaging acute/subacute left pubic fractures as discussed, age indeterminate severe  T9 and T10 compression deformities, and 9 mm pancreatic body cystic lesion.    - Patient of Dr Basilio Feldman, David Baptiste.  Recently seen in december for compression fractures.  Has TLSO brace and follow-up appointment in two weeks with Dr Feldman.  - Spoke with Dr Feldman, confirming compression fractures noted on ED imaging are stable from her visit in december.  Was instructed to have patient follow-up in two weeks, TSLo brace when OOB.  88y/o F with PMHx of CAD, HTN, HLD, hypothyroidism, and Waldenstrom's disease who was BIBEMS  to  ED with son at bedside c/o left leg weakness and left upper leg pain s/p mechanical fall three days ago (no HS / LOC per pt). Pt is now requiring assistance with ambulation (normally ambulatory at baseline). P reports having  portable x-ray done at home on Tuesday which showed a fracture in her left arm but was negative for any findings in her hip/legs. Pt f/u outpt with orthopedic surgeon (Dr Yang) yesterday and Lt arm was placed in a splint. ER diagnostic imaging acute/subacute left pubic fractures as discussed, age indeterminate severe  T9 and T10 compression deformities, and 9 mm pancreatic body cystic lesion.    - Patient of Dr Basilio Feldman, David Baptiste.  Recently seen in december for compression fractures.  Has TLSO brace and follow-up appointment in two weeks with Dr Feldman.  - Spoke with Dr Feldman, confirming compression fractures noted on ED imaging are stable from her visit in december.  Was instructed to have patient follow-up in two weeks, TSLo brace when OOB.   - Neurosurgery to sign off.     d/w Dr Cuevas.

## 2025-02-28 NOTE — PROGRESS NOTE ADULT - SUBJECTIVE AND OBJECTIVE BOX
CC: Patient is a 89y old  Female who presents with a chief complaint of s/p Fall on 2/24.    HPI:  88y/o F with PMHx of CAD, HTN, HLD, CHF, hypothyroidism, and Waldenstrom's disease who was BIBEMS  to  ED with son at bedside c/o left leg weakness and left upper leg pain s/p mechanical fall three days ago (no HS / LOC per pt). Pt is now requiring assistance with ambulation (normally ambulatory at baseline). Pt reports having  portable x-ray done at home on Tuesday which showed a fracture in her left arm but was negative for any findings in her hip/legs. Pt f/u outpt with orthopedic surgeon (Dr Yang) yesterday and Lt arm was placed in a splint. ER diagnostic imaging acute/subacute left pubic fractures as discussed, age indeterminate severe  T9 and T10 compression deformities, and 9 mm pancreatic body cystic lesion.   Pt reports L  Hip pain, has  chronic back pain, not much worse  after fall. No SOB or CP. As per daughter at bedside Pt has h/o osteoporosis and has h/o falls. On "water Pill": furosemide, Spironolactone and last was added Farxiga about 1 week ago by Cardio NP.       2/28-  patient was seen and examined. history as above. complanining of 5/10 pain on the left leg. She denies CP, sob. +back pain but stated that she's had this pain for a while. Awaiting PT consult. VSS.     ROS:   All 10 systems reviewed and found to be negative with the exception of what has been described above.     Vital Signs Last 24 Hrs  T(C): 36.9 (28 Feb 2025 07:40), Max: 37.1 (27 Feb 2025 11:10)  T(F): 98.5 (28 Feb 2025 07:40), Max: 98.8 (27 Feb 2025 11:10)  HR: 62 (28 Feb 2025 07:40) (60 - 70)  BP: 82/68 (28 Feb 2025 07:40) (82/68 - 114/55)  BP(mean): 55 (27 Feb 2025 16:11) (55 - 55)  RR: 17 (28 Feb 2025 07:40) (17 - 19)  SpO2: 95% (28 Feb 2025 07:40) (95% - 96%)    Parameters below as of 28 Feb 2025 07:40  Patient On (Oxygen Delivery Method): room air    PE:  Constitutional: NAD, laying in bed  HEENT: NC/AT  Back: no tenderness  Respiratory: respirations even and non labored, LCTA  Cardiovascular: S1S2 regular, no murmurs  Abdomen: soft, not tender, not distended, positive BS  Genitourinary: voiding  Musculoskeletal: no muscle tenderness, no joint swelling or tenderness  Extremities: No LE   edema, Decreased ROM LLE,  LUE  in posterior splint, able to move digits, good strength   Neurological: no focal deficits       LABS: all reviewed              02-28    143  |  115[H]  |  58[H]  ----------------------------<  107[H]  4.4   |  23  |  0.98    Ca    9.2      28 Feb 2025 07:31  Phos  2.5     02-28  Mg     2.4     02-28    TPro  7.0  /  Alb  3.2[L]  /  TBili  1.0  /  DBili  x   /  AST  35  /  ALT  27  /  AlkPhos  65  02-27                          9.4    7.84  )-----------( 182      ( 28 Feb 2025 07:31 )             29.6         Urinalysis Basic - ( 27 Feb 2025 11:01 )    Color: x / Appearance: x / SG: x / pH: x  Gluc: 173 mg/dL / Ketone: x  / Bili: x / Urobili: x   Blood: x / Protein: x / Nitrite: x   Leuk Esterase: x / RBC: x / WBC x   Sq Epi: x / Non Sq Epi: x / Bacteria: x        RADIOLOGY & ADDITIONAL STUDIES:    ACC: [id number] EXAM:  XR FEMUR 2 VIEWS LT     ACC: [id number] EXAM:  XR HIP WITH PELV MIN 4V LT    PROCEDURE DATE:  02/27/2025    INTERPRETATION:  Clinical history: 89-year-old female, fall.  AP view the pelvis, 2 views of the left hip and 4 views of the left femur   are correlated with a concurrent abdominal CT.  FINDINGS: Acute/subacute nondisplaced fractures of the left   superior/inferior pubic rami.  Prior right pubic fracture.  Mild degenerative change with no other acute fracture dislocation or   radiographic soft tissue abnormality.  IMPRESSION:  Acute/subacute nondisplaced fractures of the left superior/inferior pubic   rami, better characterized on CT.    ACC: [id number] EXAM:  CT ABDOMEN AND PELVIS     PROCEDURE DATE:  02/27/2025    INTERPRETATION:  CLINICAL INFORMATION: Left hip pain  COMPARISON: CT cest 8/13/2022  CONTRAST/COMPLICATIONS:  IV Contrast: NONE  Oral Contrast: NONE  PROCEDURE:  CT of the Abdomen and Pelvis was performed.  Sagittal and coronal reformats were performed.    FINDINGS:  LOWER CHEST: Cardiomegaly with vascular calcification. In situ cardiac   device. Decreased cardiac chamber blood pool attenuation suggests anemia.   Trace bibasilar effusions with dependent atelectasis. Patchy opacity   right lower lobe improved from prior may represent atelectasis or chronic   consolidation. Infection not excluded in the correct setting  LIVER: Within normal limits.  BILE DUCTS: Normal caliber.  GALLBLADDER: Within normal limits.  SPLEEN: Within normal limits.  PANCREAS:  9 mm cystic lesion pancreatic body (2, 27). Correlate with   pancreatic MRI  ADRENALS: Within normal limits.  KIDNEYS/URETERS: No hydronephrosis or urolithiasis. Bilateral nonspecific   perinephric stranding similar to prior.    BLADDER: Within normal limits.  REPRODUCTIVE ORGANS: Hysterectomy.    BOWEL: No bowel obstruction. Appendix normal  PERITONEUM/RETROPERITONEUM: Within normal limits.  VESSELS: Atherosclerotic, nonaneurysmal.  LYMPH NODES: No lymphadenopathy.  ABDOMINAL WALL: Diastases recti (mild). Small fat-containing umbilical   hernia  BONES: Acute/subacute nondisplaced fractures of the left inferior   superior pubic rami. There may be small amount of early callus. Old right   pubic fracture. Old left posterior 10th rib fracture. Stable severe   chronic T12 compression deformity. New age-indeterminate severe T10   compression deformity and T9 compression deformity. Correlate with point   tenderness    IMPRESSION:  Acute/subacute left pubic fractures as discussed.  Age indeterminate severe  T9 and T10 compression deformities, new from   8/13/2022  Otherwise no acute sequelae of trauma on this noncontrast study    9 mm pancreatic body cystic lesion. Recommend pancreatic protocol MR for   additional characterization

## 2025-02-28 NOTE — PHYSICAL THERAPY INITIAL EVALUATION ADULT - PERTINENT HX OF CURRENT PROBLEM, REHAB EVAL
left elbow/ pelvic fracture  -  88y/o F with PMHx of CAD, HTN, HLD, hypothyroidism, and Waldenstrom's disease who was BIBEMS  to  ED with son at bedside c/o left leg weakness and left upper leg pain s/p mechanical fall three days ago - 2/24/24 (no HS / LOC per pt). Pt is now requiring assistance with ambulation (normally ambulatory at baseline). P reports having  portable x-ray done at home on Tuesday which showed a fracture in her left arm but was negative for any findings in her hip/legs. Pt f/u outpt with orthopedic surgeon (Dr Yang) yesterday and Lt arm was placed in a splint. ER diagnostic imaging acute/subacute left pubic fractures as discussed, age indeterminate severe  T9 and T10 compression deformities, and 9 mm pancreatic body cystic lesion.

## 2025-03-01 DIAGNOSIS — S52.022A DISPLACED FRACTURE OF OLECRANON PROCESS WITHOUT INTRAARTICULAR EXTENSION OF LEFT ULNA, INITIAL ENCOUNTER FOR CLOSED FRACTURE: ICD-10-CM

## 2025-03-01 DIAGNOSIS — S32.9XXA FRACTURE OF UNSPECIFIED PARTS OF LUMBOSACRAL SPINE AND PELVIS, INITIAL ENCOUNTER FOR CLOSED FRACTURE: ICD-10-CM

## 2025-03-01 LAB
ANION GAP SERPL CALC-SCNC: 5 MMOL/L — SIGNIFICANT CHANGE UP (ref 5–17)
BUN SERPL-MCNC: 39 MG/DL — HIGH (ref 7–23)
CALCIUM SERPL-MCNC: 9.5 MG/DL — SIGNIFICANT CHANGE UP (ref 8.5–10.1)
CHLORIDE SERPL-SCNC: 118 MMOL/L — HIGH (ref 96–108)
CO2 SERPL-SCNC: 21 MMOL/L — LOW (ref 22–31)
CREAT SERPL-MCNC: 0.9 MG/DL — SIGNIFICANT CHANGE UP (ref 0.5–1.3)
EGFR: 61 ML/MIN/1.73M2 — SIGNIFICANT CHANGE UP
EGFR: 61 ML/MIN/1.73M2 — SIGNIFICANT CHANGE UP
GLUCOSE SERPL-MCNC: 105 MG/DL — HIGH (ref 70–99)
HCT VFR BLD CALC: 29 % — LOW (ref 34.5–45)
HGB BLD-MCNC: 9.3 G/DL — LOW (ref 11.5–15.5)
MAGNESIUM SERPL-MCNC: 2.3 MG/DL — SIGNIFICANT CHANGE UP (ref 1.6–2.6)
MCHC RBC-ENTMCNC: 32.1 G/DL — SIGNIFICANT CHANGE UP (ref 32–36)
MCHC RBC-ENTMCNC: 32.5 PG — SIGNIFICANT CHANGE UP (ref 27–34)
MCV RBC AUTO: 101.4 FL — HIGH (ref 80–100)
NRBC # BLD AUTO: 0 K/UL — SIGNIFICANT CHANGE UP (ref 0–0)
NRBC # FLD: 0 K/UL — SIGNIFICANT CHANGE UP (ref 0–0)
NRBC BLD AUTO-RTO: 0 /100 WBCS — SIGNIFICANT CHANGE UP (ref 0–0)
PHOSPHATE SERPL-MCNC: 2.3 MG/DL — LOW (ref 2.5–4.5)
PLATELET # BLD AUTO: 182 K/UL — SIGNIFICANT CHANGE UP (ref 150–400)
PMV BLD: 11.4 FL — SIGNIFICANT CHANGE UP (ref 7–13)
POTASSIUM SERPL-MCNC: 4.4 MMOL/L — SIGNIFICANT CHANGE UP (ref 3.5–5.3)
POTASSIUM SERPL-SCNC: 4.4 MMOL/L — SIGNIFICANT CHANGE UP (ref 3.5–5.3)
RBC # BLD: 2.86 M/UL — LOW (ref 3.8–5.2)
RBC # FLD: 15.9 % — HIGH (ref 10.3–14.5)
SODIUM SERPL-SCNC: 144 MMOL/L — SIGNIFICANT CHANGE UP (ref 135–145)
WBC # BLD: 7.28 K/UL — SIGNIFICANT CHANGE UP (ref 3.8–10.5)
WBC # FLD AUTO: 7.28 K/UL — SIGNIFICANT CHANGE UP (ref 3.8–10.5)

## 2025-03-01 PROCEDURE — 99231 SBSQ HOSP IP/OBS SF/LOW 25: CPT

## 2025-03-01 PROCEDURE — 99233 SBSQ HOSP IP/OBS HIGH 50: CPT

## 2025-03-01 RX ORDER — SOD PHOS DI, MONO/K PHOS MONO 250 MG
1 TABLET ORAL
Refills: 0 | Status: COMPLETED | OUTPATIENT
Start: 2025-03-01 | End: 2025-03-02

## 2025-03-01 RX ORDER — SOD PHOS DI, MONO/K PHOS MONO 250 MG
1 TABLET ORAL
Refills: 0 | Status: DISCONTINUED | OUTPATIENT
Start: 2025-03-01 | End: 2025-03-01

## 2025-03-01 RX ADMIN — ATORVASTATIN CALCIUM 20 MILLIGRAM(S): 80 TABLET, FILM COATED ORAL at 22:06

## 2025-03-01 RX ADMIN — APIXABAN 2.5 MILLIGRAM(S): 2.5 TABLET, FILM COATED ORAL at 10:19

## 2025-03-01 RX ADMIN — Medication 975 MILLIGRAM(S): at 23:27

## 2025-03-01 RX ADMIN — TRAMADOL HYDROCHLORIDE 25 MILLIGRAM(S): 50 TABLET, FILM COATED ORAL at 11:15

## 2025-03-01 RX ADMIN — Medication 75 MILLILITER(S): at 05:37

## 2025-03-01 RX ADMIN — Medication 100 MICROGRAM(S): at 05:36

## 2025-03-01 RX ADMIN — APIXABAN 2.5 MILLIGRAM(S): 2.5 TABLET, FILM COATED ORAL at 22:06

## 2025-03-01 RX ADMIN — GABAPENTIN 300 MILLIGRAM(S): 400 CAPSULE ORAL at 10:19

## 2025-03-01 RX ADMIN — CARVEDILOL 3.12 MILLIGRAM(S): 3.12 TABLET, FILM COATED ORAL at 22:07

## 2025-03-01 RX ADMIN — Medication 1 PACKET(S): at 22:06

## 2025-03-01 RX ADMIN — GABAPENTIN 300 MILLIGRAM(S): 400 CAPSULE ORAL at 22:07

## 2025-03-01 RX ADMIN — Medication 975 MILLIGRAM(S): at 12:37

## 2025-03-01 RX ADMIN — TRAMADOL HYDROCHLORIDE 25 MILLIGRAM(S): 50 TABLET, FILM COATED ORAL at 10:18

## 2025-03-01 RX ADMIN — TRAMADOL HYDROCHLORIDE 50 MILLIGRAM(S): 50 TABLET, FILM COATED ORAL at 22:38

## 2025-03-01 RX ADMIN — CARVEDILOL 3.12 MILLIGRAM(S): 3.12 TABLET, FILM COATED ORAL at 10:19

## 2025-03-01 RX ADMIN — Medication 81 MILLIGRAM(S): at 10:18

## 2025-03-01 RX ADMIN — Medication 40 MILLIGRAM(S): at 05:36

## 2025-03-01 RX ADMIN — Medication 975 MILLIGRAM(S): at 12:02

## 2025-03-01 RX ADMIN — Medication 975 MILLIGRAM(S): at 05:36

## 2025-03-01 RX ADMIN — TRAMADOL HYDROCHLORIDE 50 MILLIGRAM(S): 50 TABLET, FILM COATED ORAL at 22:08

## 2025-03-01 NOTE — CONSULT NOTE ADULT - REASON FOR ADMISSION
Fall - Pelvic Fx

## 2025-03-01 NOTE — PROGRESS NOTE ADULT - SUBJECTIVE AND OBJECTIVE BOX
Patient is a 89y old  Female who presents with a chief complaint of Fall - Pelvic Fx   3/1 No acute issues overnight, pain well controlled    Vital Signs Last 24 Hrs  T(C): 36.8 (01 Mar 2025 08:55), Max: 36.8 (01 Mar 2025 04:00)  T(F): 98.2 (01 Mar 2025 08:55), Max: 98.2 (01 Mar 2025 04:00)  HR: 64 (01 Mar 2025 08:55) (64 - 70)  BP: 109/54 (01 Mar 2025 08:55) (102/42 - 116/45)  BP(mean): --  RR: 17 (01 Mar 2025 08:55) (17 - 18)  SpO2: 95% (01 Mar 2025 08:55) (93% - 97%)    Parameters below as of 01 Mar 2025 08:55  Patient On (Oxygen Delivery Method): room air                        9.3    7.28  )-----------( 182      ( 01 Mar 2025 08:25 )             29.0       P/E No acute distress with adequate pain control and tolerating diet  Cardiopulmonary stable  Abdomen benign  Extremity LUE sling in place  NV Intact         Tertiary exam-      Patient is a 89y old  Female who presents with a chief complaint of Fall - Pelvic Fx   3/1 No acute issues overnight, pain well controlled    Vital Signs Last 24 Hrs  T(C): 36.8 (01 Mar 2025 08:55), Max: 36.8 (01 Mar 2025 04:00)  T(F): 98.2 (01 Mar 2025 08:55), Max: 98.2 (01 Mar 2025 04:00)  HR: 64 (01 Mar 2025 08:55) (64 - 70)  BP: 109/54 (01 Mar 2025 08:55) (102/42 - 116/45)  BP(mean): --  RR: 17 (01 Mar 2025 08:55) (17 - 18)  SpO2: 95% (01 Mar 2025 08:55) (93% - 97%)    Parameters below as of 01 Mar 2025 08:55  Patient On (Oxygen Delivery Method): room air                        9.3    7.28  )-----------( 182      ( 01 Mar 2025 08:25 )             29.0       P/E No acute distress with adequate pain control and tolerating diet  Cardiopulmonary stable  Abdomen benign  Extremity LUE sling in place  NV Intact

## 2025-03-01 NOTE — PROGRESS NOTE ADULT - ASSESSMENT
89 F with Hx of CAD, HTN, HLD, CHF, hypothyroidism, and Waldenstrom's disease admitted with a fall resulting multiple fractures and admitted to the trauma service.    S/p Fall resulting in Multiple Fractures - Acute/ subacute L pubic rami FX, T9-10 compression FX, left Olecranon FX  -  management as per Trauma team  -  MARTHA NWB and MARTHA in splint  -  pain control  -  incentive spirometry  -  PT as tolerated  -  TLSO brace when out of bed  -  fall precautions    Hypotension  -  Likely 2/2 hypovolemia and dehydration  -  s/p fluid bolus  -  resolved    BONNIE  -  Cr 1.51 on admission  -  today Cr 0.9  -  resolved    Chronic HFrEF  -  clinically stable without evidence of decompensation  -  continue Coreg  -  diuretics on hold at this time  -  monitor daily weights  -  strict Is/Os    CAD  -  stable, continue Coreg, ASA, statin    Hyperlipidemia  -  continue statin    Waldenstrom's disease with Chronic Anemia  -  appreciate heme consult  -  to resume [facility] inpatient  -  H/H stable    Hypophosphatemia  -  replete today    Hypothyroidism  -  continue Levothyroxine     Incidental finding of 9mm pancreatic body cystic lesion  -  outpatient follow up  -  seen by Dr. Motley    DVT prophylaxis  -  venodynes and Lovenox    d/w patient and RN   89 F with Hx of CAD, HTN, HLD, CHF, hypothyroidism, and Waldenstrom's disease admitted with a fall resulting multiple fractures and admitted to the trauma service.    S/p Fall resulting in Multiple Fractures - Acute/ subacute L pubic rami FX, T9-10 compression FX, left Olecranon FX  -  management as per Trauma team  -  MARTHA NWB and MARTHA in splint  -  pain control  -  incentive spirometry  -  PT as tolerated  -  TLSO brace when out of bed  -  fall precautions    Hypotension  -  Likely 2/2 hypovolemia and dehydration  -  s/p fluid bolus  -  resolved    BONNIE  -  Cr 1.51 on admission  -  today Cr 0.9  -  resolved    Chronic HFrEF  -  clinically stable without evidence of decompensation  -  continue Coreg  -  diuretics on hold at this time due to recent hypotension  -  was on at home:  Farxiga, Lasix, Spironolactone, Entresto  -  monitor daily weights  -  strict Is/Os  -  appreciate cardio consult    CAD  -  stable, continue Coreg, ASA, statin    Hyperlipidemia  -  continue statin    Waldenstrom's disease with Chronic Anemia  -  appreciate heme consult  -  to resume [facility]  -  H/H stable    Hypophosphatemia  -  replete today    Hypothyroidism  -  continue Levothyroxine     Incidental finding of 9mm pancreatic body cystic lesion  -  outpatient follow up  -  seen by Dr. Motley    DVT prophylaxis  -  venodynes and Lovenox    d/w patient and RN   89 F with Hx of CAD, HTN, HLD, CHF, hypothyroidism, and Waldenstrom's disease admitted with a fall resulting multiple fractures and admitted to the trauma service.    S/p Fall resulting in Multiple Fractures - Acute/ subacute L pubic rami FX, T9-10 compression FX, left Olecranon FX  -  management as per Trauma team  -  ALEXE NWB  -  LUE in splint and sling per ortho  -  pain control  -  incentive spirometry  -  PT as tolerated  -  TLSO brace when out of bed  -  fall precautions    Hypotension  -  Likely 2/2 hypovolemia and dehydration  -  s/p fluid bolus  -  resolved  -  d/c IVFs    BONNIE  -  Cr 1.51 on admission  -  today Cr 0.9  -  resolved    Chronic HFrEF  -  clinically stable without evidence of decompensation  -  continue Coreg  -  diuretics on hold at this time due to recent hypotension  -  was on at home:  Farxiga, Lasix, Spironolactone, Entresto  -  monitor daily weights  -  strict Is/Os  -  appreciate cardio consult  -  d/c IVFs    CAD  -  stable, continue Coreg, ASA, statin    Hyperlipidemia  -  continue statin    Waldenstrom's disease with Chronic Anemia  -  appreciate heme consult  -  to resume [facility]  -  H/H stable    Hypophosphatemia  -  replete today    Hypothyroidism  -  continue Levothyroxine     Incidental finding of 9mm pancreatic body cystic lesion  -  outpatient follow up  -  seen by Dr. Mtoley    DVT prophylaxis  -  venodynes and Eliquis    d/w patient and RN

## 2025-03-01 NOTE — CONSULT NOTE ADULT - SUBJECTIVE AND OBJECTIVE BOX
Patient is a 89y old  Female who presents with a chief complaint of Fall - Pelvic Fx (01 Mar 2025 09:36)      HPI:  CC: Patient is a 89y old  Female who presents with a chief complaint of s/p Fall on 2/24.    Time Notified: 1400   Time Seen: 1405    HPI:  89y/o F with PMHx of CAD, HTN, HLD, hypothyroidism, and Waldenstrom's disease who was BIBEMS  to  ED with son at bedside c/o left leg weakness and left upper leg pain s/p mechanical fall three days ago (no HS / LOC per pt). Pt is now requiring assistance with ambulation (normally ambulatory at baseline). P reports having  portable x-ray done at home on Tuesday which showed a fracture in her left arm but was negative for any findings in her hip/legs. Pt f/u outpt with orthopedic surgeon (Dr Yang) yesterday and Lt arm was placed in a splint. ER diagnostic imaging acute/subacute left pubic fractures as discussed, age indeterminate severe  T9 and T10 compression deformities, and 9 mm pancreatic body cystic lesion.  Per chart review; + APT (ASA 81), No AC therapy    Subjective:  Pt seen and examined at bedside with chaperone (son). Pt is AAOx3, no acute distress. Endorsing Lt groin pain with active LLE ROM. Pt denied c/o fever, chills, chest pain, SOB, abd pain, N/V/D, extremity dysfunction, hemoptysis, hematemesis, hematuria, hematochezia, headache, diplopia, vertigo, dizziness.    ROS: as above otherwise negative    PMH:  CAD, HTN, HLD, hypothyroidism, and Waldenstrom's disease  PSH: S/P hysterectomy.     No Known Allergies    SH: Denies toxic habits.  FH: No pertinent FMHx     Vital Signs Last 24 Hrs  T(C): 37.1 (27 Feb 2025 11:10), Max: 37.1 (27 Feb 2025 11:10)  T(F): 98.8 (27 Feb 2025 11:10), Max: 98.8 (27 Feb 2025 11:10)  HR: 64 (27 Feb 2025 10:10) (64 - 64)  BP: 103/52 (27 Feb 2025 10:10) (103/52 - 103/52)  BP(mean): --  RR: 16 (27 Feb 2025 10:10) (16 - 16)  SpO2: 96% (27 Feb 2025 10:10) (96% - 96%)    Parameters below as of 27 Feb 2025 10:10  Patient On (Oxygen Delivery Method): room air        Labs:               10.2   10.51 )-----------( 194      ( 27 Feb 2025 11:01 )             32.1     CBC Full  -  ( 27 Feb 2025 11:01 )  WBC Count : 10.51 K/uL  RBC Count : 3.20 M/uL  Hemoglobin : 10.2 g/dL  Hematocrit : 32.1 %  Platelet Count - Automated : 194 K/uL  Mean Cell Volume : 100.3 fl  Mean Cell Hemoglobin : 31.9 pg  Mean Cell Hemoglobin Concentration : 31.8 g/dL  Auto Neutrophil # : 7.28 K/uL  Auto Lymphocyte # : 1.71 K/uL  Auto Monocyte # : 1.41 K/uL  Auto Eosinophil # : 0.06 K/uL  Auto Basophil # : 0.02 K/uL  Auto Neutrophil % : 69.2 %  Auto Lymphocyte % : 16.3 %  Auto Monocyte % : 13.4 %  Auto Eosinophil % : 0.6 %  Auto Basophil % : 0.2 %    02-27    137  |  110[H]  |  74[H]  ----------------------------<  173[H]  5.0   |  23  |  1.51[H]    Ca    10.0      27 Feb 2025 11:01  Mg     2.7     02-27    TPro  7.0  /  Alb  3.2[L]  /  TBili  1.0  /  DBili  x   /  AST  35  /  ALT  27  /  AlkPhos  65  02-27    LIVER FUNCTIONS - ( 27 Feb 2025 11:01 )  Alb: 3.2 g/dL / Pro: 7.0 gm/dL / ALK PHOS: 65 U/L / ALT: 27 U/L / AST: 35 U/L / GGT: x             Meds:  acetaminophen     Tablet .. 975 milliGRAM(s) Oral every 6 hours  enoxaparin Injectable 30 milliGRAM(s) SubCutaneous every 24 hours  morphine  - Injectable 2 milliGRAM(s) IV Push every 4 hours PRN  ondansetron Injectable 4 milliGRAM(s) IV Push every 6 hours PRN  oxyCODONE    IR 2.5 milliGRAM(s) Oral every 4 hours PRN  oxyCODONE    IR 5 milliGRAM(s) Oral every 4 hours PRN  pantoprazole    Tablet 40 milliGRAM(s) Oral before breakfast  senna 2 Tablet(s) Oral at bedtime PRN  sodium chloride 0.9%. 1000 milliLiter(s) IV Continuous <Continuous>      Radiology:  < from: CT Abdomen and Pelvis No Cont (02.27.25 @ 12:43) >  IMPRESSION:  Acute/subacute left pubic fractures as discussed.  Age indeterminate severe  T9 and T10 compression deformities, new from   8/13/2022  Otherwise no acute sequelae of trauma on this noncontrast study    9 mm pancreatic body cystic lesion. Recommend pancreatic protocol MR for   additional characterization    Additional findings as discussed    --- End of Report ---    < end of copied text >    PRIMARY SURVEY:   A - airway intact  B - bilateral breath sounds and good chest rise  C - initial BP stable , palpable pulses in all extremities  D - GCS 15 on arrival. E 4, V 5, M 6.   Exposure obtained    SECONDARY SURVEY:   GCS of 15  Airway is patent  Breathing is symmetric and unlabored  Neuro: CNII-XII grossly intact  Psych: normal affect  HEENT: Normocephalic, atraumatic, SHANNAN, EOM wnl, no otorrhea or hemotympanum b/l, no epistaxis or d/c b/l nares, no craniofacial bony pathology or tenderness b/l  Neck: No crepitus, no ecchymosis, no hematoma, to exam, no JVD, no tracheal deviation  Cspine/thoracolumbrosacral spine: +mild midline tenderness over thoracic spine. No gross bony pathology or tenderness to exam  Cardiovascular: S1S2 Present  Chest: no gross rib pathology or tenderness to exam. No sternal pathology or tenderness to exam. No crepitus, no ecchymosis, no hematoma. No penetrating thorcoabdominal trauma  Respiratory: Respiratory Effort normal; no wheezes, rales or rhonchi to exam  ABD: bowel sounds (+), soft, nontender, non distended, no rebound, no guarding, no rigidity, no skin changes to exam. No pelvic instability to exam, no skin changes  Musculoskeletal: +TTP over Lt lateral groin region. Pt has palpable b/l radial, femoral, dorsalis pedis pulses. All digits are warm and well perfused. No gross long bone pathology or tenderness to exam. Pt demonstrates grossly intact sensoromotor function. Pt has good capillary refill to digits, no calf edema or tenderness to exam.  Skin: no lesions or rashes to exam       (27 Feb 2025 14:34)       REVIEW OF SYSTEMS:    CONSTITUTIONAL: No weakness, fevers or chills  EYES/ENT: No visual changes;  No vertigo or throat pain   NECK: No pain or stiffness  RESPIRATORY: No cough, wheezing, hemoptysis; No shortness of breath  CARDIOVASCULAR: No chest pain or palpitations  GASTROINTESTINAL: No abdominal or epigastric pain. No nausea, vomiting, or hematemesis; No diarrhea or constipation. No melena or hematochezia.  GENITOURINARY: No dysuria, frequency or hematuria  NEUROLOGICAL: No numbness or weakness  SKIN: No itching, burning, rashes, or lesions   All other review of systems is negative unless indicated above.    PAST MEDICAL & SURGICAL HISTORY:  Hypertension      Waldenstrom's disease      HLD (hyperlipidemia)      CAD (coronary artery disease)      Hypothyroidism      S/P hysterectomy          SOCIAL HISTORY:    FAMILY HISTORY:  No pertinent family history in first degree relatives        MEDICATIONS  (STANDING):  acetaminophen     Tablet .. 975 milliGRAM(s) Oral every 6 hours  apixaban 2.5 milliGRAM(s) Oral every 12 hours  aspirin enteric coated 81 milliGRAM(s) Oral daily  atorvastatin 20 milliGRAM(s) Oral at bedtime  Calquence (Acalabrutinib) 100mg 1 Tablet(s)   Oral daily  carvedilol 3.125 milliGRAM(s) Oral every 12 hours  gabapentin 300 milliGRAM(s) Oral two times a day  levothyroxine 100 MICROGram(s) Oral daily  pantoprazole    Tablet 40 milliGRAM(s) Oral before breakfast  sodium chloride 0.9%. 1000 milliLiter(s) (75 mL/Hr) IV Continuous <Continuous>    MEDICATIONS  (PRN):  albuterol    90 MICROgram(s) HFA Inhaler 1 Puff(s) Inhalation every 6 hours PRN Shortness of Breath and/or Wheezing  benzocaine/menthol Lozenge 1 Lozenge Oral four times a day PRN Sore Throat  morphine  - Injectable 2 milliGRAM(s) IV Push every 4 hours PRN brekathrough pain  ondansetron Injectable 4 milliGRAM(s) IV Push every 6 hours PRN Nausea  senna 2 Tablet(s) Oral at bedtime PRN Constipation  traMADol 25 milliGRAM(s) Oral every 8 hours PRN Moderate Pain (4 - 6)  traMADol 50 milliGRAM(s) Oral every 8 hours PRN Severe Pain (7 - 10)      Allergies    No Known Allergies    Intolerances        Vital Signs Last 24 Hrs  T(C): 36.7 (01 Mar 2025 11:53), Max: 36.8 (01 Mar 2025 04:00)  T(F): 98 (01 Mar 2025 11:53), Max: 98.2 (01 Mar 2025 04:00)  HR: 65 (01 Mar 2025 11:53) (64 - 69)  BP: 114/55 (01 Mar 2025 11:53) (102/42 - 116/45)  BP(mean): --  RR: 18 (01 Mar 2025 11:53) (17 - 18)  SpO2: 94% (01 Mar 2025 11:53) (93% - 95%)    Parameters below as of 01 Mar 2025 11:53  Patient On (Oxygen Delivery Method): room air        PHYSICAL EXAM  General: adult in NAD  HEENT: clear oropharynx, anicteric sclera, pink conjunctiva  Neck: supple  CV: normal S1/S2 with no murmur rubs or gallops  Lungs: positive air movement b/l ant lungs,clear to auscultation, no wheezes, no rales  Abdomen: soft non-tender non-distended, no hepatosplenomegaly  Ext: no clubbing cyanosis or edema  Skin: no rashes and no petechiae  Neuro: alert and oriented X 4, no focal deficits      LABS:                          9.3    7.28  )-----------( 182      ( 01 Mar 2025 08:25 )             29.0         Mean Cell Volume : 101.4 fl  Mean Cell Hemoglobin : 32.5 pg  Mean Cell Hemoglobin Concentration : 32.1 g/dL  Auto Neutrophil # : x  Auto Lymphocyte # : x  Auto Monocyte # : x  Auto Eosinophil # : x  Auto Basophil # : x  Auto Neutrophil % : x  Auto Lymphocyte % : x  Auto Monocyte % : x  Auto Eosinophil % : x  Auto Basophil % : x      Serial CBC's  03-01 @ 08:25  Hct-29.0 / Hgb-9.3 / Plat-182 / RBC-2.86 / WBC-7.28  Serial CBC's  02-28 @ 13:41  Hct-29.4 / Hgb-9.6 / Plat-200 / RBC-2.94 / WBC-9.24  Serial CBC's  02-28 @ 07:31  Hct-29.6 / Hgb-9.4 / Plat-182 / RBC-2.94 / WBC-7.84  Serial CBC's  02-27 @ 11:01  Hct-32.1 / Hgb-10.2 / Plat-194 / RBC-3.20 / WBC-10.51      03-01    144  |  118[H]  |  39[H]  ----------------------------<  105[H]  4.4   |  21[L]  |  0.90    Ca    9.5      01 Mar 2025 08:25  Phos  2.3     03-01  Mg     2.3     03-01                        BLOOD SMEAR INTERPRETATION:       RADIOLOGY & ADDITIONAL STUDIES:         Patient is a 89y old  Female who presents with a chief complaint of Fall - Pelvic Fx (01 Mar 2025 09:36)      HPI:  CC: Patient is a 89y old  Female who presents with a chief complaint of s/p Fall on 2/24.    HPI:  89y/o F with PMHx of CAD, HTN, HLD, hypothyroidism, and Waldenstrom's disease who was BIBEMS  to  ED with son at bedside c/o left leg weakness and left upper leg pain s/p mechanical fall three days ago (no HS / LOC per pt). Pt is now requiring assistance with ambulation (normally ambulatory at baseline). P reports having  portable x-ray done at home on Tuesday which showed a fracture in her left arm but was negative for any findings in her hip/legs. Pt f/u outpt with orthopedic surgeon (Dr Yang) yesterday and Lt arm was placed in a splint. ER diagnostic imaging acute/subacute left pubic fractures as discussed, age indeterminate severe  T9 and T10 compression deformities, and 9 mm pancreatic body cystic lesion.  Per chart review; + APT (ASA 81), No AC therapy  Pt is AAOx3, no acute distress. sitting in chair .  family at bedside       ROS: as above otherwise negative    PMH:  CAD, HTN, HLD, hypothyroidism, and Waldenstrom's disease  PSH: S/P hysterectomy.     No Known Allergies    SH: Denies toxic habits.  FH: No pertinent FMHx     Vital Signs Last 24 Hrs  T(C): 37.1 (27 Feb 2025 11:10), Max: 37.1 (27 Feb 2025 11:10)  T(F): 98.8 (27 Feb 2025 11:10), Max: 98.8 (27 Feb 2025 11:10)  HR: 64 (27 Feb 2025 10:10) (64 - 64)  BP: 103/52 (27 Feb 2025 10:10) (103/52 - 103/52)  BP(mean): --  RR: 16 (27 Feb 2025 10:10) (16 - 16)  SpO2: 96% (27 Feb 2025 10:10) (96% - 96%)    Parameters below as of 27 Feb 2025 10:10  Patient On (Oxygen Delivery Method): room air    HEENT: Normocephalic, atraumatic, SHANNAN, EOM wnl, no otorrhea or hemotympanum b/l, no epistaxis or d/c b/l nares, no craniofacial bony pathology or tenderness b/l  Neck: No crepitus, no ecchymosis, no hematoma, to exam, no JVD, no tracheal deviation  Cspine/thoracolumbrosacral spine: +mild midline tenderness over thoracic spine. No gross bony pathology or tenderness to exam  Cardiovascular: S1S2 Present  Chest: no gross rib pathology or tenderness to exam. No sternal pathology or tenderness to exam. No crepitus, no ecchymosis, no hematoma. No penetrating thorcoabdominal trauma  Respiratory: Respiratory Effort normal; no wheezes, rales or rhonchi to exam  ABD: bowel sounds (+), soft, nontender, non distended, no rebound, no guarding, no rigidity, no skin changes to exam. No pelvic instability to exam, no skin changes  Musculoskeletal: +TTP over Lt lateral groin region. Pt has palpable b/l radial, femoral, dorsalis pedis pulses. All digits are warm and well perfused. No gross long bone pathology or tenderness to exam. Pt demonstrates grossly intact sensoromotor function. Pt has good capillary refill to digits, no calf edema or tenderness to exam.  Skin: no lesions or rashes to exam          Labs:               10.2   10.51 )-----------( 194      ( 27 Feb 2025 11:01 )             32.1     CBC Full  -  ( 27 Feb 2025 11:01 )  WBC Count : 10.51 K/uL  RBC Count : 3.20 M/uL  Hemoglobin : 10.2 g/dL  Hematocrit : 32.1 %  Platelet Count - Automated : 194 K/uL  Mean Cell Volume : 100.3 fl  Mean Cell Hemoglobin : 31.9 pg  Mean Cell Hemoglobin Concentration : 31.8 g/dL  Auto Neutrophil # : 7.28 K/uL  Auto Lymphocyte # : 1.71 K/uL  Auto Monocyte # : 1.41 K/uL  Auto Eosinophil # : 0.06 K/uL  Auto Basophil # : 0.02 K/uL  Auto Neutrophil % : 69.2 %  Auto Lymphocyte % : 16.3 %  Auto Monocyte % : 13.4 %  Auto Eosinophil % : 0.6 %  Auto Basophil % : 0.2 %    02-27    137  |  110[H]  |  74[H]  ----------------------------<  173[H]  5.0   |  23  |  1.51[H]    Ca    10.0      27 Feb 2025 11:01  Mg     2.7     02-27    TPro  7.0  /  Alb  3.2[L]  /  TBili  1.0  /  DBili  x   /  AST  35  /  ALT  27  /  AlkPhos  65  02-27    LIVER FUNCTIONS - ( 27 Feb 2025 11:01 )  Alb: 3.2 g/dL / Pro: 7.0 gm/dL / ALK PHOS: 65 U/L / ALT: 27 U/L / AST: 35 U/L / GGT: x           Radiology:  < from: CT Abdomen and Pelvis No Cont (02.27.25 @ 12:43) >  IMPRESSION:  Acute/subacute left pubic fractures as discussed.  Age indeterminate severe  T9 and T10 compression deformities, new from   8/13/2022  Otherwise no acute sequelae of trauma on this noncontrast study    9 mm pancreatic body cystic lesion. Recommend pancreatic protocol MR for   additional characterization    Additional findings as discussed    PAST MEDICAL & SURGICAL HISTORY:  Hypertension      Waldenstrom's disease      HLD (hyperlipidemia)      CAD (coronary artery disease)      Hypothyroidism      S/P hysterectomy          SOCIAL HISTORY:      LABS:                          9.3    7.28  )-----------( 182      ( 01 Mar 2025 08:25 )             29.0         Mean Cell Volume : 101.4 fl  Mean Cell Hemoglobin : 32.5 pg  Mean Cell Hemoglobin Concentration : 32.1 g/dL  Auto Neutrophil # : x  Auto Lymphocyte # : x  Auto Monocyte # : x  Auto Eosinophil # : x  Auto Basophil # : x  Auto Neutrophil % : x  Auto Lymphocyte % : x  Auto Monocyte % : x  Auto Eosinophil % : x  Auto Basophil % : x      Serial CBC's  03-01 @ 08:25  Hct-29.0 / Hgb-9.3 / Plat-182 / RBC-2.86 / WBC-7.28  Serial CBC's  02-28 @ 13:41  Hct-29.4 / Hgb-9.6 / Plat-200 / RBC-2.94 / WBC-9.24  Serial CBC's  02-28 @ 07:31  Hct-29.6 / Hgb-9.4 / Plat-182 / RBC-2.94 / WBC-7.84  Serial CBC's  02-27 @ 11:01  Hct-32.1 / Hgb-10.2 / Plat-194 / RBC-3.20 / WBC-10.51      03-01    144  |  118[H]  |  39[H]  ----------------------------<  105[H]  4.4   |  21[L]  |  0.90    Ca    9.5      01 Mar 2025 08:25  Phos  2.3     03-01  Mg     2.3     03-01                        BLOOD SMEAR INTERPRETATION:       RADIOLOGY & ADDITIONAL STUDIES:

## 2025-03-01 NOTE — CONSULT NOTE ADULT - CONSULT REASON
Medical management
Thoracic compression fracture
cardiac mgmt
Pancreatic Cyst
Jo Ann
left pubic rami fx

## 2025-03-01 NOTE — CONSULT NOTE ADULT - CONSULT REQUESTED DATE/TIME
27-Feb-2025
27-Feb-2025 17:56
28-Feb-2025 15:26
28-Feb-2025 06:52
28-Feb-2025 08:00
01-Mar-2025 13:51

## 2025-03-01 NOTE — PROGRESS NOTE ADULT - SUBJECTIVE AND OBJECTIVE BOX
Chart and meds reviewed.  Patient seen and examined.    3/1 -     All other systems reviewed and found to be negative with the exception of what has been described above.    MEDICATIONS  (STANDING):  acetaminophen     Tablet .. 975 milliGRAM(s) Oral every 6 hours  apixaban 2.5 milliGRAM(s) Oral every 12 hours  aspirin enteric coated 81 milliGRAM(s) Oral daily  atorvastatin 20 milliGRAM(s) Oral at bedtime  Calquence (Acalabrutinib) 100mg 1 Tablet(s)   Oral daily  carvedilol 3.125 milliGRAM(s) Oral every 12 hours  gabapentin 300 milliGRAM(s) Oral two times a day  levothyroxine 100 MICROGram(s) Oral daily  pantoprazole    Tablet 40 milliGRAM(s) Oral before breakfast  sodium chloride 0.9%. 1000 milliLiter(s) (75 mL/Hr) IV Continuous <Continuous>    MEDICATIONS  (PRN):  albuterol    90 MICROgram(s) HFA Inhaler 1 Puff(s) Inhalation every 6 hours PRN Shortness of Breath and/or Wheezing  benzocaine/menthol Lozenge 1 Lozenge Oral four times a day PRN Sore Throat  morphine  - Injectable 2 milliGRAM(s) IV Push every 4 hours PRN brekathrough pain  ondansetron Injectable 4 milliGRAM(s) IV Push every 6 hours PRN Nausea  senna 2 Tablet(s) Oral at bedtime PRN Constipation  traMADol 25 milliGRAM(s) Oral every 8 hours PRN Moderate Pain (4 - 6)  traMADol 50 milliGRAM(s) Oral every 8 hours PRN Severe Pain (7 - 10)    VITAL SIGNS:  T(F): 98 (03-01-25 @ 11:53), Max: 98.2 (03-01-25 @ 04:00)  HR: 65 (03-01-25 @ 11:53) (64 - 69)  BP: 114/55 (03-01-25 @ 11:53) (102/42 - 116/45)  RR: 18 (03-01-25 @ 11:53) (17 - 18)  SpO2: 94% (03-01-25 @ 11:53) (93% - 95%)    I&O's Summary    28 Feb 2025 07:01  -  01 Mar 2025 07:00  --------------------------------------------------------  IN: 1200 mL / OUT: 0 mL / NET: 1200 mL    PHYSICAL EXAM:  HEENT:  pupils equal and reactive, EOMI, no oropharyngeal lesions, erythema, exudates, oral thrush  NECK:   supple, no carotid bruits  CV:  +S1, +S2, regular, no murmurs  RESP:   lungs clear to auscultation bilaterally, no wheezing, rales, rhonchi, good air entry bilaterally  GI:  abdomen soft, non-tender, non-distended, normal BS  EXT:  no clubbing, no cyanosis, no edema, no calf pain, swelling or erythema  NEURO:  AAOX3, no focal neurological deficits, follows all commands, able to move extremities spontaneously  SKIN:  no ulcers, lesions or rashes    LABS:                        9.3    7.28  )-----------( 182      ( 01 Mar 2025 08:25 )             29.0     03-01    144  |  118[H]  |  39[H]  ----------------------------<  105[H]  4.4   |  21[L]  |  0.90    Ca    9.5      01 Mar 2025 08:25  Phos  2.3     03-01  Mg     2.3     03-01     Chart and meds reviewed.  Patient seen and examined.    3/1 - no events overnight, no complaints of shortness of breath, chest pain, nausea, or vomiting, + pain in the left arm and pelvis.    All other systems reviewed and found to be negative with the exception of what has been described above.    MEDICATIONS  (STANDING):  acetaminophen     Tablet .. 975 milliGRAM(s) Oral every 6 hours  apixaban 2.5 milliGRAM(s) Oral every 12 hours  aspirin enteric coated 81 milliGRAM(s) Oral daily  atorvastatin 20 milliGRAM(s) Oral at bedtime  Calquence (Acalabrutinib) 100mg 1 Tablet(s)   Oral daily  carvedilol 3.125 milliGRAM(s) Oral every 12 hours  gabapentin 300 milliGRAM(s) Oral two times a day  levothyroxine 100 MICROGram(s) Oral daily  pantoprazole    Tablet 40 milliGRAM(s) Oral before breakfast  sodium chloride 0.9%. 1000 milliLiter(s) (75 mL/Hr) IV Continuous <Continuous>    MEDICATIONS  (PRN):  albuterol    90 MICROgram(s) HFA Inhaler 1 Puff(s) Inhalation every 6 hours PRN Shortness of Breath and/or Wheezing  benzocaine/menthol Lozenge 1 Lozenge Oral four times a day PRN Sore Throat  morphine  - Injectable 2 milliGRAM(s) IV Push every 4 hours PRN brekathrough pain  ondansetron Injectable 4 milliGRAM(s) IV Push every 6 hours PRN Nausea  senna 2 Tablet(s) Oral at bedtime PRN Constipation  traMADol 25 milliGRAM(s) Oral every 8 hours PRN Moderate Pain (4 - 6)  traMADol 50 milliGRAM(s) Oral every 8 hours PRN Severe Pain (7 - 10)    VITAL SIGNS:  T(F): 98 (03-01-25 @ 11:53), Max: 98.2 (03-01-25 @ 04:00)  HR: 65 (03-01-25 @ 11:53) (64 - 69)  BP: 114/55 (03-01-25 @ 11:53) (102/42 - 116/45)  RR: 18 (03-01-25 @ 11:53) (17 - 18)  SpO2: 94% (03-01-25 @ 11:53) (93% - 95%)    I&O's Summary    28 Feb 2025 07:01  -  01 Mar 2025 07:00  --------------------------------------------------------  IN: 1200 mL / OUT: 0 mL / NET: 1200 mL    PHYSICAL EXAM:  HEENT:  pupils equal and reactive, EOMI, no oropharyngeal lesions, erythema, exudates, oral thrush  NECK:   supple, no carotid bruits  CV:  +S1, +S2, regular, no murmurs  RESP:   lungs clear to auscultation bilaterally, no wheezing, rales, rhonchi, good air entry bilaterally  GI:  abdomen soft, non-tender, non-distended, normal BS  EXT:  no LE edema, left arm in splint and sling  NEURO:  AAOX3, no focal neurological deficits, follows all commands, able to move extremities spontaneously  SKIN:  no ulcers, lesions or rashes    LABS:                        9.3    7.28  )-----------( 182      ( 01 Mar 2025 08:25 )             29.0     03-01    144  |  118[H]  |  39[H]  ----------------------------<  105[H]  4.4   |  21[L]  |  0.90    Ca    9.5      01 Mar 2025 08:25  Phos  2.3     03-01  Mg     2.3     03-01

## 2025-03-01 NOTE — CONSULT NOTE ADULT - ASSESSMENT
89y/o F with PMHx of CAD, HTN, HLD, CHF, hypothyroidism, and Waldenstrom's lymphoma admitted s/p fall     Waldenstroms Lymphoma   - followed at [facility]  - on Calquence 100mg daily .  may continue while inpt    S/p recent fall resulted in Multiple Fx, unable to ambulate   Acute/ subacute L pubic rami FX  T9-10  compression FX  - likely old, seen on outside CT from [facility]   L olecranon FX - LUE NWB  management as per Trauma/Ortho  no surgical intervention planned     Elevated BUN/CR,  prerenal due to overdiuresis and dehydration - resolved iwth IVF hydration     Incidental finding of 9mm pancreatic body cystic lesion - recommend f/u outpatient at [facility].  may need MRI     DVT PPX: Lovenox     will update [facility]     David Green MD  [facility]  [phone]

## 2025-03-02 LAB
ANION GAP SERPL CALC-SCNC: 4 MMOL/L — LOW (ref 5–17)
BUN SERPL-MCNC: 36 MG/DL — HIGH (ref 7–23)
CALCIUM SERPL-MCNC: 9.1 MG/DL — SIGNIFICANT CHANGE UP (ref 8.5–10.1)
CHLORIDE SERPL-SCNC: 115 MMOL/L — HIGH (ref 96–108)
CO2 SERPL-SCNC: 22 MMOL/L — SIGNIFICANT CHANGE UP (ref 22–31)
CREAT SERPL-MCNC: 0.9 MG/DL — SIGNIFICANT CHANGE UP (ref 0.5–1.3)
EGFR: 61 ML/MIN/1.73M2 — SIGNIFICANT CHANGE UP
EGFR: 61 ML/MIN/1.73M2 — SIGNIFICANT CHANGE UP
GLUCOSE SERPL-MCNC: 100 MG/DL — HIGH (ref 70–99)
HCT VFR BLD CALC: 27.8 % — LOW (ref 34.5–45)
HGB BLD-MCNC: 8.8 G/DL — LOW (ref 11.5–15.5)
MAGNESIUM SERPL-MCNC: 2.3 MG/DL — SIGNIFICANT CHANGE UP (ref 1.6–2.6)
MCHC RBC-ENTMCNC: 31.7 G/DL — LOW (ref 32–36)
MCHC RBC-ENTMCNC: 32 PG — SIGNIFICANT CHANGE UP (ref 27–34)
MCV RBC AUTO: 101.1 FL — HIGH (ref 80–100)
NRBC # BLD AUTO: 0 K/UL — SIGNIFICANT CHANGE UP (ref 0–0)
NRBC # FLD: 0 K/UL — SIGNIFICANT CHANGE UP (ref 0–0)
NRBC BLD AUTO-RTO: 0 /100 WBCS — SIGNIFICANT CHANGE UP (ref 0–0)
PHOSPHATE SERPL-MCNC: 2.9 MG/DL — SIGNIFICANT CHANGE UP (ref 2.5–4.5)
PLATELET # BLD AUTO: 189 K/UL — SIGNIFICANT CHANGE UP (ref 150–400)
PMV BLD: 11.6 FL — SIGNIFICANT CHANGE UP (ref 7–13)
POTASSIUM SERPL-MCNC: 4.7 MMOL/L — SIGNIFICANT CHANGE UP (ref 3.5–5.3)
POTASSIUM SERPL-SCNC: 4.7 MMOL/L — SIGNIFICANT CHANGE UP (ref 3.5–5.3)
RBC # BLD: 2.75 M/UL — LOW (ref 3.8–5.2)
RBC # FLD: 15.9 % — HIGH (ref 10.3–14.5)
SODIUM SERPL-SCNC: 141 MMOL/L — SIGNIFICANT CHANGE UP (ref 135–145)
WBC # BLD: 7.96 K/UL — SIGNIFICANT CHANGE UP (ref 3.8–10.5)
WBC # FLD AUTO: 7.96 K/UL — SIGNIFICANT CHANGE UP (ref 3.8–10.5)

## 2025-03-02 PROCEDURE — 99232 SBSQ HOSP IP/OBS MODERATE 35: CPT

## 2025-03-02 RX ADMIN — Medication 975 MILLIGRAM(S): at 05:24

## 2025-03-02 RX ADMIN — GABAPENTIN 300 MILLIGRAM(S): 400 CAPSULE ORAL at 21:08

## 2025-03-02 RX ADMIN — GABAPENTIN 300 MILLIGRAM(S): 400 CAPSULE ORAL at 10:56

## 2025-03-02 RX ADMIN — ATORVASTATIN CALCIUM 20 MILLIGRAM(S): 80 TABLET, FILM COATED ORAL at 21:08

## 2025-03-02 RX ADMIN — CARVEDILOL 3.12 MILLIGRAM(S): 3.12 TABLET, FILM COATED ORAL at 21:08

## 2025-03-02 RX ADMIN — Medication 975 MILLIGRAM(S): at 13:12

## 2025-03-02 RX ADMIN — APIXABAN 2.5 MILLIGRAM(S): 2.5 TABLET, FILM COATED ORAL at 10:56

## 2025-03-02 RX ADMIN — Medication 975 MILLIGRAM(S): at 12:23

## 2025-03-02 RX ADMIN — Medication 81 MILLIGRAM(S): at 10:56

## 2025-03-02 RX ADMIN — TRAMADOL HYDROCHLORIDE 50 MILLIGRAM(S): 50 TABLET, FILM COATED ORAL at 21:09

## 2025-03-02 RX ADMIN — Medication 100 MICROGRAM(S): at 05:24

## 2025-03-02 RX ADMIN — TRAMADOL HYDROCHLORIDE 50 MILLIGRAM(S): 50 TABLET, FILM COATED ORAL at 21:39

## 2025-03-02 RX ADMIN — Medication 1 PACKET(S): at 10:57

## 2025-03-02 RX ADMIN — Medication 975 MILLIGRAM(S): at 21:09

## 2025-03-02 RX ADMIN — APIXABAN 2.5 MILLIGRAM(S): 2.5 TABLET, FILM COATED ORAL at 21:09

## 2025-03-02 NOTE — PROGRESS NOTE ADULT - ASSESSMENT
89 F with Hx of CAD, HTN, HLD, CHF, hypothyroidism, and Waldenstrom's disease admitted with a fall resulting multiple fractures and admitted to the trauma service.    S/p Fall resulting in Multiple Fractures - Acute/subacute L pubic rami FX, T9-10 compression FX, left Olecranon FX  -  management as per Trauma team  -  LUE NWB  -  LUE in splint and sling per ortho  -  pain control  -  incentive spirometry  -  PT as tolerated  -  TLSO brace when out of bed  -  fall precautions    Hypotension  -  likely 2/2 hypovolemia and dehydration  -  s/p fluid bolus  -  resolved    BONNIE  -  Cr 1.51 on admission  -  today Cr 0.9  -  resolved  -  follow labs today    Chronic HFrEF  -  clinically stable without evidence of decompensation  -  continue Coreg  -  diuretics on hold at this time due to recent hypotension  -  was on at home:  Farxiga, Lasix, Spironolactone, Entresto - > resume at discharge  -  monitor daily weights  -  strict Is/Os  -  appreciate cardio consult    CAD  -  stable, continue Coreg, ASA, statin    Acute on Chronic Anemia  -  H/H has been stable  -  follow up CBC today    Hyperlipidemia  -  continue statin    Waldenstrom's disease with Chronic Anemia  -  appreciate heme consult  -  continue Calquence inpatient per heme/onc  -  H/H stable    Hypothyroidism  -  continue Levothyroxine     Incidental finding of 9mm pancreatic body cystic lesion  -  outpatient follow up  -  seen by Dr. Motley    DVT prophylaxis  -  venodynes and Eliquis    Dispo:  will need JENNIFER this week    d/w patient and RN

## 2025-03-02 NOTE — PROGRESS NOTE ADULT - SUBJECTIVE AND OBJECTIVE BOX
Patient is a 89y old  Female who presents with a chief complaint of Fall - Pelvic Fx (01 Mar 2025 09:36)      HPI:  CC: Patient is a 89y old  Female who presents with a chief complaint of s/p Fall on 2/24.    HPI:  89y/o F with PMHx of CAD, HTN, HLD, hypothyroidism, and Waldenstrom's disease who was BIBEMS  to  ED with son at bedside c/o left leg weakness and left upper leg pain s/p mechanical fall three days ago (no HS / LOC per pt). Pt is now requiring assistance with ambulation (normally ambulatory at baseline). P reports having  portable x-ray done at home on Tuesday which showed a fracture in her left arm but was negative for any findings in her hip/legs. Pt f/u outpt with orthopedic surgeon (Dr Yang) yesterday and Lt arm was placed in a splint. ER diagnostic imaging acute/subacute left pubic fractures as discussed, age indeterminate severe  T9 and T10 compression deformities, and 9 mm pancreatic body cystic lesion.  Per chart review; + APT (ASA 81), No AC therapy  Pt is AAOx3, no acute distress. sitting in chair .  family at bedside       ROS: as above otherwise negative    PMH:  CAD, HTN, HLD, hypothyroidism, and Waldenstrom's disease  PSH: S/P hysterectomy.     No Known Allergies    SH: Denies toxic habits.  FH: No pertinent FMHx     Vital Signs Last 24 Hrs  T(C): 36.7 (03-02-25 @ 00:00), Max: 37 (03-01-25 @ 20:00)  T(F): 98.1 (03-02-25 @ 00:00), Max: 98.6 (03-01-25 @ 20:00)  HR: 65 (03-02-25 @ 00:00) (65 - 89)  BP: 114/51 (03-02-25 @ 00:00) (104/48 - 124/49)  BP(mean): --  RR: 17 (03-02-25 @ 00:00) (17 - 18)  SpO2: 94% (03-02-25 @ 00:00) (93% - 94%)          HEENT: Normocephalic, atraumatic, SHANNAN, EOM wnl, no otorrhea or hemotympanum b/l, no epistaxis or d/c b/l nares, no craniofacial bony pathology or tenderness b/l  Neck: No crepitus, no ecchymosis, no hematoma, to exam, no JVD, no tracheal deviation  Cspine/thoracolumbrosacral spine: +mild midline tenderness over thoracic spine. No gross bony pathology or tenderness to exam  Cardiovascular: S1S2 Present  Chest: no gross rib pathology or tenderness to exam. No sternal pathology or tenderness to exam. No crepitus, no ecchymosis, no hematoma. No penetrating thorcoabdominal trauma  Respiratory: Respiratory Effort normal; no wheezes, rales or rhonchi to exam  ABD: bowel sounds (+), soft, nontender, non distended, no rebound, no guarding, no rigidity, no skin changes to exam. No pelvic instability to exam, no skin changes  Musculoskeletal: +TTP over Lt lateral groin region. Pt has palpable b/l radial, femoral, dorsalis pedis pulses. All digits are warm and well perfused. No gross long bone pathology or tenderness to exam. Pt demonstrates grossly intact sensoromotor function. Pt has good capillary refill to digits, no calf edema or tenderness to exam.  Skin: no lesions or rashes to exam          Labs:                            8.8    7.96  )-----------( 189      ( 02 Mar 2025 08:26 )             27.8                  10.2   10.51 )-----------( 194      ( 27 Feb 2025 11:01 )             32.1     CBC Full  -  ( 27 Feb 2025 11:01 )  WBC Count : 10.51 K/uL  RBC Count : 3.20 M/uL  Hemoglobin : 10.2 g/dL  Hematocrit : 32.1 %  Platelet Count - Automated : 194 K/uL  Mean Cell Volume : 100.3 fl  Mean Cell Hemoglobin : 31.9 pg  Mean Cell Hemoglobin Concentration : 31.8 g/dL  Auto Neutrophil # : 7.28 K/uL  Auto Lymphocyte # : 1.71 K/uL  Auto Monocyte # : 1.41 K/uL  Auto Eosinophil # : 0.06 K/uL  Auto Basophil # : 0.02 K/uL  Auto Neutrophil % : 69.2 %  Auto Lymphocyte % : 16.3 %  Auto Monocyte % : 13.4 %  Auto Eosinophil % : 0.6 %  Auto Basophil % : 0.2 %    02-27    137  |  110[H]  |  74[H]  ----------------------------<  173[H]  5.0   |  23  |  1.51[H]    Ca    10.0      27 Feb 2025 11:01  Mg     2.7     02-27    TPro  7.0  /  Alb  3.2[L]  /  TBili  1.0  /  DBili  x   /  AST  35  /  ALT  27  /  AlkPhos  65  02-27    LIVER FUNCTIONS - ( 27 Feb 2025 11:01 )  Alb: 3.2 g/dL / Pro: 7.0 gm/dL / ALK PHOS: 65 U/L / ALT: 27 U/L / AST: 35 U/L / GGT: x           Radiology:  < from: CT Abdomen and Pelvis No Cont (02.27.25 @ 12:43) >  IMPRESSION:  Acute/subacute left pubic fractures as discussed.  Age indeterminate severe  T9 and T10 compression deformities, new from   8/13/2022  Otherwise no acute sequelae of trauma on this noncontrast study    9 mm pancreatic body cystic lesion. Recommend pancreatic protocol MR for   additional characterization    Additional findings as discussed    PAST MEDICAL & SURGICAL HISTORY:  Hypertension      Waldenstrom's disease      HLD (hyperlipidemia)      CAD (coronary artery disease)      Hypothyroidism      S/P hysterectomy          SOCIAL HISTORY:      LABS:                          9.3    7.28  )-----------( 182      ( 01 Mar 2025 08:25 )             29.0         Mean Cell Volume : 101.4 fl  Mean Cell Hemoglobin : 32.5 pg  Mean Cell Hemoglobin Concentration : 32.1 g/dL  Auto Neutrophil # : x  Auto Lymphocyte # : x  Auto Monocyte # : x  Auto Eosinophil # : x  Auto Basophil # : x  Auto Neutrophil % : x  Auto Lymphocyte % : x  Auto Monocyte % : x  Auto Eosinophil % : x  Auto Basophil % : x      Serial CBC's  03-01 @ 08:25  Hct-29.0 / Hgb-9.3 / Plat-182 / RBC-2.86 / WBC-7.28  Serial CBC's  02-28 @ 13:41  Hct-29.4 / Hgb-9.6 / Plat-200 / RBC-2.94 / WBC-9.24  Serial CBC's  02-28 @ 07:31  Hct-29.6 / Hgb-9.4 / Plat-182 / RBC-2.94 / WBC-7.84  Serial CBC's  02-27 @ 11:01  Hct-32.1 / Hgb-10.2 / Plat-194 / RBC-3.20 / WBC-10.51      03-01    144  |  118[H]  |  39[H]  ----------------------------<  105[H]  4.4   |  21[L]  |  0.90    Ca    9.5      01 Mar 2025 08:25  Phos  2.3     03-01  Mg     2.3     03-01                        BLOOD SMEAR INTERPRETATION:       RADIOLOGY & ADDITIONAL STUDIES:

## 2025-03-02 NOTE — PROGRESS NOTE ADULT - SUBJECTIVE AND OBJECTIVE BOX
Chart and meds reviewed.  Patient seen and examined.    3/2 - no events overnight, has no shortness of breath or cough, no chest pain.  Doing well.    All other systems reviewed and found to be negative with the exception of what has been described above.    MEDICATIONS  (STANDING):  acetaminophen     Tablet .. 975 milliGRAM(s) Oral every 6 hours  apixaban 2.5 milliGRAM(s) Oral every 12 hours  aspirin enteric coated 81 milliGRAM(s) Oral daily  atorvastatin 20 milliGRAM(s) Oral at bedtime  Calquence (Acalabrutinib) 100mg 1 Tablet(s)   Oral daily  carvedilol 3.125 milliGRAM(s) Oral every 12 hours  gabapentin 300 milliGRAM(s) Oral two times a day  levothyroxine 100 MICROGram(s) Oral daily  pantoprazole    Tablet 40 milliGRAM(s) Oral before breakfast  potassium phosphate / sodium phosphate Powder (PHOS-NaK) 1 Packet(s) Oral two times a day    MEDICATIONS  (PRN):  albuterol    90 MICROgram(s) HFA Inhaler 1 Puff(s) Inhalation every 6 hours PRN Shortness of Breath and/or Wheezing  benzocaine/menthol Lozenge 1 Lozenge Oral four times a day PRN Sore Throat  morphine  - Injectable 2 milliGRAM(s) IV Push every 4 hours PRN brekathrough pain  ondansetron Injectable 4 milliGRAM(s) IV Push every 6 hours PRN Nausea  senna 2 Tablet(s) Oral at bedtime PRN Constipation  traMADol 25 milliGRAM(s) Oral every 8 hours PRN Moderate Pain (4 - 6)  traMADol 50 milliGRAM(s) Oral every 8 hours PRN Severe Pain (7 - 10)    VITAL SIGNS:  T(F): 98.1 (03-02-25 @ 00:00), Max: 98.6 (03-01-25 @ 20:00)  HR: 65 (03-02-25 @ 00:00) (64 - 89)  BP: 114/51 (03-02-25 @ 00:00) (104/48 - 124/49)  RR: 17 (03-02-25 @ 00:00) (17 - 18)  SpO2: 94% (03-02-25 @ 00:00) (93% - 95%)    I&O's Summary    01 Mar 2025 07:01  -  02 Mar 2025 07:00  --------------------------------------------------------  IN: 1170 mL / OUT: 0 mL / NET: 1170 mL    PHYSICAL EXAM:  HEENT:  pupils equal and reactive, EOMI, no oropharyngeal lesions, erythema, exudates, oral thrush  NECK:   supple, no carotid bruits  CV:  +S1, +S2, regular, no murmurs  RESP:   lungs clear to auscultation bilaterally, no wheezing, rales, rhonchi, good air entry bilaterally  GI:  abdomen soft, non-tender, non-distended, normal BS  EXT:  left arm in splint and sling, no LE edema  NEURO:  AAOX3, no focal neurological deficits, follows all commands, able to move extremities spontaneously  SKIN:  no ulcers, lesions or rashes    LABS:                        9.3    7.28  )-----------( 182      ( 01 Mar 2025 08:25 )             29.0     03-01    144  |  118[H]  |  39[H]  ----------------------------<  105[H]  4.4   |  21[L]  |  0.90    Ca    9.5      01 Mar 2025 08:25  Phos  2.3     03-01  Mg     2.3     03-01

## 2025-03-02 NOTE — PROGRESS NOTE ADULT - ASSESSMENT
88y/o F with PMHx of CAD, HTN, HLD, CHF, hypothyroidism, and Waldenstrom's lymphoma admitted s/p fall     Waldenstroms Lymphoma   [place] [facility]  [place] Calquence 100mg daily .  may continue while inpt    S/p recent fall resulted in Multiple Fx, unable to ambulate   Acute/ subacute L pubic rami FX  T9-10  compression FX  - likely old, seen on outside [place] [facility]   L olecranon FX - LUE NWB  management as per Trauma/Ortho  no surgical intervention planned     Elevated BUN/CR,  prerenal due to overdiuresis and dehydration - resolved iwth IVF hydration     Incidental finding of 9mm pancreatic body cystic lesion - recommend f/u outpatient at [facility].  may need MRI     DVT PPX: Lovenox     will update [facility]     David Green MD  [facility]  [phone]  89y/o F with PMHx of CAD, HTN, HLD, CHF, hypothyroidism, and Waldenstrom's lymphoma admitted s/p fall     Waldenstroms Lymphoma   - followed at [facility]  - on Calquence 100mg daily .  may continue while inpt    S/p recent fall resulted in Multiple Fx, unable to ambulate   Acute/ subacute L pubic rami FX  T9-10  compression FX  - likely old, seen on outside CT from [facility]   L olecranon FX - LUE NWB  management as per Trauma/Ortho  no surgical intervention planned     Elevated BUN/CR,  prerenal due to overdiuresis and dehydration - resolved iwth IVF hydration     Incidental finding of 9mm pancreatic body cystic lesion - recommend f/u outpatient at [facility].  may need MRI     DVT PPX: Lovenox     eventual plan d/c to rehab    will update [facility]     David Green MD  [facility]  [phone]

## 2025-03-03 ENCOUNTER — TRANSCRIPTION ENCOUNTER (OUTPATIENT)
Age: 89
End: 2025-03-03

## 2025-03-03 LAB
ANION GAP SERPL CALC-SCNC: 7 MMOL/L — SIGNIFICANT CHANGE UP (ref 5–17)
BUN SERPL-MCNC: 30 MG/DL — HIGH (ref 7–23)
CALCIUM SERPL-MCNC: 9.7 MG/DL — SIGNIFICANT CHANGE UP (ref 8.5–10.1)
CHLORIDE SERPL-SCNC: 112 MMOL/L — HIGH (ref 96–108)
CO2 SERPL-SCNC: 23 MMOL/L — SIGNIFICANT CHANGE UP (ref 22–31)
CREAT SERPL-MCNC: 0.86 MG/DL — SIGNIFICANT CHANGE UP (ref 0.5–1.3)
EGFR: 65 ML/MIN/1.73M2 — SIGNIFICANT CHANGE UP
EGFR: 65 ML/MIN/1.73M2 — SIGNIFICANT CHANGE UP
GLUCOSE SERPL-MCNC: 140 MG/DL — HIGH (ref 70–99)
HCT VFR BLD CALC: 30.3 % — LOW (ref 34.5–45)
HGB BLD-MCNC: 9.7 G/DL — LOW (ref 11.5–15.5)
MAGNESIUM SERPL-MCNC: 2.2 MG/DL — SIGNIFICANT CHANGE UP (ref 1.6–2.6)
MCHC RBC-ENTMCNC: 32 G/DL — SIGNIFICANT CHANGE UP (ref 32–36)
MCHC RBC-ENTMCNC: 32.8 PG — SIGNIFICANT CHANGE UP (ref 27–34)
MCV RBC AUTO: 102.4 FL — HIGH (ref 80–100)
NRBC # BLD AUTO: 0 K/UL — SIGNIFICANT CHANGE UP (ref 0–0)
NRBC # FLD: 0 K/UL — SIGNIFICANT CHANGE UP (ref 0–0)
NRBC BLD AUTO-RTO: 0 /100 WBCS — SIGNIFICANT CHANGE UP (ref 0–0)
PHOSPHATE SERPL-MCNC: 2.7 MG/DL — SIGNIFICANT CHANGE UP (ref 2.5–4.5)
PLATELET # BLD AUTO: 226 K/UL — SIGNIFICANT CHANGE UP (ref 150–400)
PMV BLD: 11.6 FL — SIGNIFICANT CHANGE UP (ref 7–13)
POTASSIUM SERPL-MCNC: 4.9 MMOL/L — SIGNIFICANT CHANGE UP (ref 3.5–5.3)
POTASSIUM SERPL-SCNC: 4.9 MMOL/L — SIGNIFICANT CHANGE UP (ref 3.5–5.3)
RBC # BLD: 2.96 M/UL — LOW (ref 3.8–5.2)
RBC # FLD: 15.8 % — HIGH (ref 10.3–14.5)
SODIUM SERPL-SCNC: 142 MMOL/L — SIGNIFICANT CHANGE UP (ref 135–145)
WBC # BLD: 8.67 K/UL — SIGNIFICANT CHANGE UP (ref 3.8–10.5)
WBC # FLD AUTO: 8.67 K/UL — SIGNIFICANT CHANGE UP (ref 3.8–10.5)

## 2025-03-03 PROCEDURE — 99232 SBSQ HOSP IP/OBS MODERATE 35: CPT

## 2025-03-03 RX ORDER — ALBUTEROL SULFATE 2.5 MG/3ML
1 VIAL, NEBULIZER (ML) INHALATION
Qty: 0 | Refills: 0 | DISCHARGE
Start: 2025-03-03

## 2025-03-03 RX ORDER — SENNA 187 MG
2 TABLET ORAL
Qty: 0 | Refills: 0 | DISCHARGE
Start: 2025-03-03

## 2025-03-03 RX ORDER — ACALABRUTINIB 100 MG/1
1 TABLET, FILM COATED ORAL
Qty: 0 | Refills: 0 | DISCHARGE

## 2025-03-03 RX ORDER — ACETAMINOPHEN 500 MG/5ML
3 LIQUID (ML) ORAL
Qty: 0 | Refills: 0 | DISCHARGE
Start: 2025-03-03

## 2025-03-03 RX ADMIN — Medication 975 MILLIGRAM(S): at 11:11

## 2025-03-03 RX ADMIN — ATORVASTATIN CALCIUM 20 MILLIGRAM(S): 80 TABLET, FILM COATED ORAL at 21:08

## 2025-03-03 RX ADMIN — Medication 975 MILLIGRAM(S): at 18:41

## 2025-03-03 RX ADMIN — Medication 100 MICROGRAM(S): at 05:14

## 2025-03-03 RX ADMIN — APIXABAN 2.5 MILLIGRAM(S): 2.5 TABLET, FILM COATED ORAL at 11:08

## 2025-03-03 RX ADMIN — Medication 975 MILLIGRAM(S): at 05:14

## 2025-03-03 RX ADMIN — CARVEDILOL 3.12 MILLIGRAM(S): 3.12 TABLET, FILM COATED ORAL at 11:07

## 2025-03-03 RX ADMIN — APIXABAN 2.5 MILLIGRAM(S): 2.5 TABLET, FILM COATED ORAL at 21:08

## 2025-03-03 RX ADMIN — GABAPENTIN 300 MILLIGRAM(S): 400 CAPSULE ORAL at 11:08

## 2025-03-03 RX ADMIN — Medication 81 MILLIGRAM(S): at 11:07

## 2025-03-03 RX ADMIN — CARVEDILOL 3.12 MILLIGRAM(S): 3.12 TABLET, FILM COATED ORAL at 21:10

## 2025-03-03 RX ADMIN — GABAPENTIN 300 MILLIGRAM(S): 400 CAPSULE ORAL at 21:08

## 2025-03-03 RX ADMIN — Medication 975 MILLIGRAM(S): at 11:07

## 2025-03-03 NOTE — DISCHARGE NOTE PROVIDER - HOSPITAL COURSE
88y/o F with PMHx of CAD, HTN, HLD, hypothyroidism, and Waldenstrom's disease who was BIBEMS  to  ED with son at bedside c/o left leg weakness and left upper leg pain s/p mechanical fall three days ago - 2/24/24 (no HS / LOC per pt). Pt is now requiring assistance with ambulation (normally ambulatory at baseline). P reports having  portable x-ray done at home on Tuesday which showed a fracture in her left arm but was negative for any findings in her hip/legs. Pt f/u outpt with orthopedic surgeon (Dr Yang) yesterday and Lt arm was placed in a splint. ER diagnostic imaging acute/subacute left pubic fractures as discussed, age indeterminate severe  T9 and T10 compression deformities, and 9 mm pancreatic body cystic lesion.   Orthopedics consulted, no intervention for pelvic fractures, weight bearing as tolerated; left olecranon fracture splinted and placed in sling   Neurosurgery consulted; known compression fractures, TLSO brace while OOB, f/u outpatient   Cardiology consulted for extensive history; hold diuretics at this time d/t hypotension, resume Asa, eliquis, lipitor  GI consulted for incidental 9mm pancreatic lesion; f/u outpatient with PCP  Heme/onc consulted for waldenstorm lymphoma; resume home calquence and f/u [facility]   PT recs [facility]     Patient tolerating diet, ambulating with assistance, voiding without difficulty, and bowel function.   Cleared for discharge to [facility]    89y/o F with PMHx of CAD, HTN, HLD, hypothyroidism, and Waldenstrom's disease who was BIBEMS  to  ED with son at bedside c/o left leg weakness and left upper leg pain s/p mechanical fall three days ago - 2/24/24 (no HS / LOC per pt). Pt is now requiring assistance with ambulation (normally ambulatory at baseline). Pt reports having a portable x-ray done at home on Tuesday which showed a fracture in her left arm but was negative for any findings in her hip/legs. Pt f/u outpt with orthopedic surgeon (Dr. Yang) yesterday and Lt arm was placed in a splint. ER diagnostic imaging notable for acute/subacute left pubic fractures, age indeterminate severe T9 and T10 compression deformities, and 9 mm pancreatic body cystic lesion.   Orthopedics consulted: no intervention for pelvic fractures, weight bearing as tolerated LLE (f/u Sudeep 2 weeks); left olecranon fracture splinted and placed in sling outpt, NWB LUE (f/u Celia)  Neurosurgery consulted: known compression fractures, pt has TLSO brace - to be worn while OOB, f/u outpatient with her surgeon Dr. Feldman  Cardiology consulted for extensive history: hold diuretics at this time d/t hypotension, resume Asa, Eliquis, Lipitor  GI consulted for incidental 9mm pancreatic lesion: f/u outpatient  Heme/onc consulted for waldenstorm lymphoma: resume home calquence and f/u [facility]   PT recs [facility]     Patient tolerating diet, ambulating with assistance, voiding without difficulty, and bowel function.   Cleared for discharge to [facility]    88y/o F with PMHx of CAD, HTN, HLD, hypothyroidism, and Waldenstrom's disease who was BIBEMS  to  ED with son at bedside c/o left leg weakness and left upper leg pain s/p mechanical fall three days ago - 2/24/24 (no HS / LOC per pt). Pt is now requiring assistance with ambulation (normally ambulatory at baseline). Pt reports having a portable x-ray done at home on Tuesday which showed a fracture in her left arm but was negative for any findings in her hip/legs. Pt f/u outpt with orthopedic surgeon (Dr. Yang) yesterday and Lt arm was placed in a splint. ER diagnostic imaging notable for acute/subacute left pubic fractures, age indeterminate severe T9 and T10 compression deformities, and 9 mm pancreatic body cystic lesion.   Orthopedics consulted: no intervention for pelvic fractures, weight bearing as tolerated LLE (f/u Sudeep 2 weeks); left olecranon fracture splinted and placed in sling outpt, NWB LUE (f/u Celia)  Neurosurgery consulted: known compression fractures, pt has TLSO brace - to be worn while OOB, f/u outpatient with her surgeon Dr. Feldman  Cardiology consulted for extensive history: hold diuretics at this time d/t hypotension, resume Asa, Eliquis, Lipitor  GI consulted for incidental 9mm pancreatic lesion: f/u outpatient  Heme/onc consulted for waldenstorm lymphoma: resume home calquence, continue upon d/c, f/u [facility], no plans for chemo at rehab   PT recs [facility]     Patient tolerating diet, ambulating with assistance, voiding without difficulty, and bowel function.   Cleared for discharge to [facility]

## 2025-03-03 NOTE — DISCHARGE NOTE PROVIDER - NSDCFUADDAPPT_GEN_ALL_CORE_FT
Please follow up with your PCP  Please follow up with Dr. Yang (elbow fracture)  Please follow up with Dr. Feldman (spine)  Please follow up with Dr. Sheets  Please follow up with your doctor at [facility]

## 2025-03-03 NOTE — PROGRESS NOTE ADULT - SUBJECTIVE AND OBJECTIVE BOX
INTERVAL HPI/OVERNIGHT EVENTS:  Patient S&E at bedside. No o/n events,   labs reviewed and appropriate  vss on RA   plan for JENNIFER this week   LUE in sling w/o significant pain     PAST MEDICAL & SURGICAL HISTORY:  Hypertension      Waldenstrom's disease      HLD (hyperlipidemia)      CAD (coronary artery disease)      Hypothyroidism      S/P hysterectomy          FAMILY HISTORY:  No pertinent family history in first degree relatives        VITAL SIGNS:  T(F): 98.6 (03-03-25 @ 00:00)  HR: 62 (03-03-25 @ 00:00)  BP: 110/56 (03-03-25 @ 00:00)  RR: 17 (03-03-25 @ 00:00)  SpO2: 96% (03-03-25 @ 00:00)  Wt(kg): --    PHYSICAL EXAM:    Constitutional: NAD  Eyes: EOMI,   Respiratory: CTA b/l, good air entry b/l  Cardiovascular: RRR,   Gastrointestinal: soft, NTND,   Extremities: LUE in sling w/o significant pain   Neurological: AAOx3      MEDICATIONS  (STANDING):  acetaminophen     Tablet .. 975 milliGRAM(s) Oral every 6 hours  apixaban 2.5 milliGRAM(s) Oral every 12 hours  aspirin enteric coated 81 milliGRAM(s) Oral daily  atorvastatin 20 milliGRAM(s) Oral at bedtime  Calquence (Acalabrutinib) 100mg 1 Tablet(s) 100 milliGRAM(s) Oral daily  carvedilol 3.125 milliGRAM(s) Oral every 12 hours  gabapentin 300 milliGRAM(s) Oral two times a day  levothyroxine 100 MICROGram(s) Oral daily  pantoprazole    Tablet 40 milliGRAM(s) Oral before breakfast    MEDICATIONS  (PRN):  albuterol    90 MICROgram(s) HFA Inhaler 1 Puff(s) Inhalation every 6 hours PRN Shortness of Breath and/or Wheezing  benzocaine/menthol Lozenge 1 Lozenge Oral four times a day PRN Sore Throat  morphine  - Injectable 2 milliGRAM(s) IV Push every 4 hours PRN brekathrough pain  ondansetron Injectable 4 milliGRAM(s) IV Push every 6 hours PRN Nausea  senna 2 Tablet(s) Oral at bedtime PRN Constipation  traMADol 25 milliGRAM(s) Oral every 8 hours PRN Moderate Pain (4 - 6)  traMADol 50 milliGRAM(s) Oral every 8 hours PRN Severe Pain (7 - 10)      Allergies    No Known Allergies    Intolerances        LABS:                        8.8    7.96  )-----------( 189      ( 02 Mar 2025 08:26 )             27.8     03-02    141  |  115[H]  |  36[H]  ----------------------------<  100[H]  4.7   |  22  |  0.90    Ca    9.1      02 Mar 2025 08:26  Phos  2.9     03-02  Mg     2.3     03-02        Urinalysis Basic - ( 02 Mar 2025 08:26 )    Color: x / Appearance: x / SG: x / pH: x  Gluc: 100 mg/dL / Ketone: x  / Bili: x / Urobili: x   Blood: x / Protein: x / Nitrite: x   Leuk Esterase: x / RBC: x / WBC x   Sq Epi: x / Non Sq Epi: x / Bacteria: x        RADIOLOGY & ADDITIONAL TESTS:  Studies reviewed.

## 2025-03-03 NOTE — DISCHARGE NOTE PROVIDER - CARE PROVIDER_API CALL
Cyndy Sheets  Orthopaedic Surgery  [address] [place] [address]  Phone: [phone]  Fax: [phone]  Follow Up Time: 2 weeks    Buddy Tarango  Cardiovascular Disease  [address]  [place] [address]  Phone: [phone]  Fax: [phone]  Follow Up Time: 2 weeks    FALGUNI DONALDSON/[facility]C  Phone: (   )    -  Fax: (   )    -  Follow Up Time: 2 weeks   Buddy Tarango  Cardiovascular Disease  [address], [address]  [place] [address]  Phone: [phone]  Fax: [phone]  Follow Up Time: 2 weeks    Tania Yang  Orthopaedic Surgery  [address] [place] [address]  Phone: [phone]  Fax: [phone]  Follow Up Time: 2 weeks    Tae Motley  Gastroenterology  [address]  [place] [address]  Phone: [phone]  Fax: [phone]  Follow Up Time: 1 month    [facility], [facility]  Phone: (   )    -  Fax: (   )    -  Follow Up Time: 2 weeks    Dae Sheets  Orthopaedic Surgery  [address], [address]  [place] [address]  Phone: [phone]  Fax: [phone]  Follow Up Time: 2 weeks

## 2025-03-03 NOTE — PROGRESS NOTE ADULT - SUBJECTIVE AND OBJECTIVE BOX
Chart and meds reviewed.  Patient seen and examined.    3/3-  Patient was seen and examined. VSS. No acute overnight events. Denies fever, pain or SOB. Tolerating diet.     All other systems reviewed and found to be negative with the exception of what has been described above.    MEDICATIONS  (STANDING):  acetaminophen     Tablet .. 975 milliGRAM(s) Oral every 6 hours  apixaban 2.5 milliGRAM(s) Oral every 12 hours  aspirin enteric coated 81 milliGRAM(s) Oral daily  atorvastatin 20 milliGRAM(s) Oral at bedtime  Calquence (Acalabrutinib) 100mg 1 Tablet(s) 100 milliGRAM(s) Oral daily  carvedilol 3.125 milliGRAM(s) Oral every 12 hours  gabapentin 300 milliGRAM(s) Oral two times a day  levothyroxine 100 MICROGram(s) Oral daily  pantoprazole    Tablet 40 milliGRAM(s) Oral before breakfast    MEDICATIONS  (PRN):  albuterol    90 MICROgram(s) HFA Inhaler 1 Puff(s) Inhalation every 6 hours PRN Shortness of Breath and/or Wheezing  benzocaine/menthol Lozenge 1 Lozenge Oral four times a day PRN Sore Throat  morphine  - Injectable 2 milliGRAM(s) IV Push every 4 hours PRN brekathrough pain  ondansetron Injectable 4 milliGRAM(s) IV Push every 6 hours PRN Nausea  senna 2 Tablet(s) Oral at bedtime PRN Constipation  traMADol 25 milliGRAM(s) Oral every 8 hours PRN Moderate Pain (4 - 6)  traMADol 50 milliGRAM(s) Oral every 8 hours PRN Severe Pain (7 - 10)      Vital Signs Last 24 Hrs  T(C): 36.8 (03 Mar 2025 08:00), Max: 37 (03 Mar 2025 00:00)  T(F): 98.2 (03 Mar 2025 08:00), Max: 98.6 (03 Mar 2025 00:00)  HR: 65 (03 Mar 2025 08:00) (62 - 65)  BP: 121/67 (03 Mar 2025 08:00) (110/56 - 121/67)  BP(mean): --  RR: 17 (03 Mar 2025 08:00) (17 - 17)  SpO2: 99% (03 Mar 2025 08:00) (96% - 99%)    Parameters below as of 03 Mar 2025 08:00  Patient On (Oxygen Delivery Method): room air        PE:  Constitutional: NAD, laying in bed  HEENT: NC/AT  Back: no tenderness  Respiratory: respirations even and non labored, LCTA  Cardiovascular: S1S2 regular, no murmurs  Abdomen: soft, not tender, not distended, positive BS  Genitourinary: voiding  Musculoskeletal: no muscle tenderness, no joint swelling or tenderness  Extremities: no pedal edema   Neurological: no focal deficits      LABS:             03-03    142  |  112[H]  |  30[H]  ----------------------------<  140[H]  4.9   |  23  |  0.86    Ca    9.7      03 Mar 2025 09:21  Phos  2.7     03-03  Mg     2.2     03-03                          9.7    8.67  )-----------( 226      ( 03 Mar 2025 09:21 )             30.3

## 2025-03-03 NOTE — DISCHARGE NOTE PROVIDER - NSDCFUADDINST_GEN_ALL_CORE_FT
non-weight bearing on LUE   weight bearing as tolerated on b/l LE   no heavy lifting/straining/ pushing   maintain splint/sling LUE   TLSO brace while out of bed

## 2025-03-03 NOTE — DISCHARGE NOTE PROVIDER - NSDCCPCAREPLAN_GEN_ALL_CORE_FT
PRINCIPAL DISCHARGE DIAGNOSIS  Diagnosis: Pubic ramus fracture  Assessment and Plan of Treatment:       SECONDARY DISCHARGE DIAGNOSES  Diagnosis: Fracture of left olecranon process  Assessment and Plan of Treatment:     Diagnosis: Fracture of left pelvis  Assessment and Plan of Treatment:

## 2025-03-03 NOTE — DISCHARGE NOTE PROVIDER - PROVIDER TOKENS
PROVIDER:[TOKEN:[[id number]],FOLLOWUP:[2 weeks]],PROVIDER:[TOKEN:[[id number]],FOLLOWUP:[2 weeks]],FREE:[LAST:[[facility]],FIRST:[HEME/ONC],PHONE:[(   )    -],FAX:[(   )    -],FOLLOWUP:[2 weeks]] PROVIDER:[TOKEN:[[id number]],FOLLOWUP:[2 weeks]],PROVIDER:[TOKEN:[[id number]],FOLLOWUP:[2 weeks]],PROVIDER:[TOKEN:[[id number]],FOLLOWUP:[1 month]],FREE:[LAST:[[facility]],FIRST:[HEME/ONC],PHONE:[(   )    -],FAX:[(   )    -],FOLLOWUP:[2 weeks]],PROVIDER:[TOKEN:[[id number]],FOLLOWUP:[2 weeks]]

## 2025-03-03 NOTE — PROGRESS NOTE ADULT - NS ATTEND AMEND GEN_ALL_CORE FT
89y/o F with PMHx of CAD, HTN, HLD, hypothyroidism, and Waldenstrom's disease who was BIBEMS  to  ED with son at bedside c/o left leg weakness and left upper leg pain s/p mechanical fall three days ago     #Mechanical Fall 2/24  #Lt Elbow Fracture  #Lt Pelvic Fracture  #Age indtm T9 & T10 Compression Deformity  #Pancreatic Body Cystic Lesion  #BONNIE (Baseline Cr ~0.7)  #Leukocytosis  #CAD  #HTN  #Hypothyroidism  #Waldenstrom's disease      Analgesia  Neurosx reccs appreciated  Cards recs appreciated  IS use  Regular diet  GI recs appreciated  Daily labs, replete lytes prn,   Heme consult appreciated  Ortho consult appreciated  NWB RUE  Splint in place RUE  WBAT RLE, PT  SW & CM for dispo

## 2025-03-03 NOTE — PROGRESS NOTE ADULT - ASSESSMENT
88y/o F with PMHx of CAD, HTN, HLD, hypothyroidism, and Waldenstrom's disease who was BIBEMS  to  ED with son at bedside c/o left leg weakness and left upper leg pain s/p mechanical fall three days ago - 2/24/24 (no HS / LOC per pt). Pt is now requiring assistance with ambulation (normally ambulatory at baseline). P reports having  portable x-ray done at home on Tuesday which showed a fracture in her left arm but was negative for any findings in her hip/legs. Pt f/u outpt with orthopedic surgeon (Dr Yang) yesterday and Lt arm was placed in a splint. ER diagnostic imaging acute/subacute left pubic fractures as discussed, age indeterminate severe  T9 and T10 compression deformities, and 9 mm pancreatic body cystic lesion.  Per chart review; + APT (ASA 81), No AC therapy    #Mechanical Fall 2/24  #Lt Elbow Fracture  #Lt Pelvic Fracture  #Age indtm T9 & T10 Compression Deformity  #Pancreatic Body Cystic Lesion  #BONNIE (Baseline Cr ~0.7)  #Leukocytosis  #CAD  #HTN  #Hypothyroidism  #Waldenstrom's disease      Neuro: pain regimen prn, avoid narcotics              Neurosx: known compression fx home TLSO brace while OOB, f/u Dr. Freed in 2 weeks   Cardio: continue to monitor vitals              cards recs: resume ASA/Eliquis/Lipitor                               hold diuretics restart outpatient  Pulm: encourage IS use  GI: regular diet, PPI ppx, Zofran prn, senna       GI recs: f/u pancreatic lesion outpatient PCP   Renal/: daily labs, replete lytes prn,   Heme: eliquis, SCDs,             heme recs: resume Calquene 100 daily ( will confirm with daughter), f/u [facility] in 2 weeks   Ortho: NWB RUE, splint in place RUE, maintain sling RUE, WBAT RLE, PT           f/u Dr. Sheets in 2 wks     sw & cm for dispo  dw attending  88y/o F with PMHx of CAD, HTN, HLD, hypothyroidism, and Waldenstrom's disease who was BIBEMS  to  ED with son at bedside c/o left leg weakness and left upper leg pain s/p mechanical fall three days ago - 2/24/24 (no HS / LOC per pt). Pt is now requiring assistance with ambulation (normally ambulatory at baseline). P reports having  portable x-ray done at home on Tuesday which showed a fracture in her left arm but was negative for any findings in her hip/legs. Pt f/u outpt with orthopedic surgeon (Dr Yang) yesterday and Lt arm was placed in a splint. ER diagnostic imaging acute/subacute left pubic fractures as discussed, age indeterminate severe  T9 and T10 compression deformities, and 9 mm pancreatic body cystic lesion.  Per chart review; + APT (ASA 81), No AC therapy    #Mechanical Fall 2/24  #Lt Elbow Fracture  #Lt Pelvic Fracture  #Age indtm T9 & T10 Compression Deformity  #Pancreatic Body Cystic Lesion  #BONNIE (Baseline Cr ~0.7)  #Leukocytosis  #CAD  #HTN  #Hypothyroidism  #Waldenstrom's disease      Neuro: pain regimen prn, avoid narcotics              Neurosx: known compression fx home TLSO brace while OOB, f/u Dr. Freed in 2 weeks   Cardio: continue to monitor vitals              cards recs: resume ASA/Eliquis/Lipitor                               hold diuretics restart outpatient  Pulm: encourage IS use  GI: regular diet, PPI ppx, Zofran prn, senna       GI recs: f/u pancreatic lesion outpatient PCP   Renal/: daily labs, replete lytes prn,   Heme: eliquis, SCDs,             heme recs: resume Calquene 100 daily, f/u [facility] in 2 weeks   Ortho: NWB RUE, splint in place RUE, maintain sling RUE, WBAT RLE, PT           f/u Dr. Sheets in 2 wks     sw & cm for dispo  dw attending

## 2025-03-03 NOTE — PROGRESS NOTE ADULT - TIME BILLING
Assessment of pain control and improvement of activity
Pt examination, review of relevant labs and radiologic studies, assured stabilization of pt, discussion with relevant services/providers for coordination of pt care and services, time is exclusive of resident and/or physician assistant teaching or supervision time

## 2025-03-03 NOTE — PROGRESS NOTE ADULT - ASSESSMENT
89 F with Hx of CAD, HTN, HLD, CHF, hypothyroidism, and Waldenstrom's disease admitted with a fall resulting multiple fractures and admitted to the trauma service.    S/p Fall resulting in Multiple Fractures - Acute/subacute L pubic rami FX, T9-10 compression FX, left Olecranon FX  -  management as per Trauma team  -  LUE NWB  -  LUE in splint and sling per ortho  -  pain control  -  incentive spirometry  -  PT as tolerated  -  TLSO brace when out of bed  -  fall precautions    Hypotension  -  likely 2/2 hypovolemia and dehydration  -  s/p fluid bolus  -  resolved    BONNIE  -  Cr 1.51 on admission  -  resolved    Chronic HFrEF  -  clinically stable without evidence of decompensation  -  continue Coreg  -  diuretics on hold at this time due to recent hypotension  -  was on at home:  Farxiga, Lasix, Spironolactone, Entresto - > resume at discharge  -  monitor daily weights  -  strict Is/Os  -  appreciate cardio consult    CAD  -  stable, continue Coreg, ASA, statin    Acute on Chronic Anemia  -  H/H stable     Hyperlipidemia  -  continue statin    Waldenstrom's disease with Chronic Anemia  -  appreciate heme consult  -  continue Calquence inpatient per heme/onc  -  H/H stable    Hypothyroidism  -  continue Levothyroxine     Incidental finding of 9mm pancreatic body cystic lesion  -  outpatient follow up  -  seen by Dr. Motley    DVT prophylaxis  -  venodynes and Eliquis      Case d/w team on IDR.   No medical contraindication for dc.

## 2025-03-03 NOTE — DISCHARGE NOTE PROVIDER - NSDCFUSCHEDAPPT_GEN_ALL_CORE_FT
Basilio Feldman  [facility]  [address] [facility]  Scheduled Appointment: 03/12/2025    [address]  Scheduled Appointment: 03/31/2025

## 2025-03-03 NOTE — DISCHARGE NOTE PROVIDER - CARE PROVIDERS DIRECT ADDRESSES
,[email or web address],DirectAddress_Unknown,DirectAddress_Unknown ,DirectAddress_Unknown,DirectAddress_Unknown,DirectAddress_Unknown,DirectAddress_Unknown,DirectAddress_Unknown

## 2025-03-03 NOTE — PROGRESS NOTE ADULT - SUBJECTIVE AND OBJECTIVE BOX
CC:Patient is a 89y old  Female who presents with a chief complaint of Fall - Pelvic Fx (03 Mar 2025 08:30)      Subjective:  NAEON  PAtient has no complaints at this time. Patient does not recall is she takes Calquence for her Waldenstrom lymphoma  Pt seen and examined at bedside with chaperone. Pt is AAOx3, pt in no acute distress. Pt denied c/o fever, chills, chest pain, SOB, abd pain, N/V/D, extremity pain or dysfunction, hemoptysis, hematemesis, hematuria, hematochexia, headache, diplopia, vertigo, dizziness Pt tolerating diet, (+) void, (+) ambulation, (+) bowel function    ROS:  otherwise as abovementioned ROS    Vital Signs Last 24 Hrs  T(C): 36.8 (03 Mar 2025 08:00), Max: 37 (03 Mar 2025 00:00)  T(F): 98.2 (03 Mar 2025 08:00), Max: 98.6 (03 Mar 2025 00:00)  HR: 65 (03 Mar 2025 08:00) (62 - 65)  BP: 121/67 (03 Mar 2025 08:00) (110/56 - 121/67)  BP(mean): --  RR: 17 (03 Mar 2025 08:00) (17 - 17)  SpO2: 99% (03 Mar 2025 08:00) (96% - 99%)    Parameters below as of 03 Mar 2025 08:00  Patient On (Oxygen Delivery Method): room air        Labs:               9.7    8.67  )-----------( 226      ( 03 Mar 2025 09:21 )             30.3     CBC Full  -  ( 03 Mar 2025 09:21 )  WBC Count : 8.67 K/uL  RBC Count : 2.96 M/uL  Hemoglobin : 9.7 g/dL  Hematocrit : 30.3 %  Platelet Count - Automated : 226 K/uL  Mean Cell Volume : 102.4 fl  Mean Cell Hemoglobin : 32.8 pg  Mean Cell Hemoglobin Concentration : 32.0 g/dL  Auto Neutrophil # : x  Auto Lymphocyte # : x  Auto Monocyte # : x  Auto Eosinophil # : x  Auto Basophil # : x  Auto Neutrophil % : x  Auto Lymphocyte % : x  Auto Monocyte % : x  Auto Eosinophil % : x  Auto Basophil % : x    03-03    142  |  112[H]  |  30[H]  ----------------------------<  140[H]  4.9   |  23  |  0.86    Ca    9.7      03 Mar 2025 09:21  Phos  2.7     03-03  Mg     2.2     03-03              Meds:  acetaminophen     Tablet .. 975 milliGRAM(s) Oral every 6 hours  albuterol    90 MICROgram(s) HFA Inhaler 1 Puff(s) Inhalation every 6 hours PRN  apixaban 2.5 milliGRAM(s) Oral every 12 hours  aspirin enteric coated 81 milliGRAM(s) Oral daily  atorvastatin 20 milliGRAM(s) Oral at bedtime  benzocaine/menthol Lozenge 1 Lozenge Oral four times a day PRN  Calquence (Acalabrutinib) 100mg 1 Tablet(s) 100 milliGRAM(s) Oral daily  carvedilol 3.125 milliGRAM(s) Oral every 12 hours  gabapentin 300 milliGRAM(s) Oral two times a day  levothyroxine 100 MICROGram(s) Oral daily  morphine  - Injectable 2 milliGRAM(s) IV Push every 4 hours PRN  ondansetron Injectable 4 milliGRAM(s) IV Push every 6 hours PRN  pantoprazole    Tablet 40 milliGRAM(s) Oral before breakfast  senna 2 Tablet(s) Oral at bedtime PRN  traMADol 25 milliGRAM(s) Oral every 8 hours PRN  traMADol 50 milliGRAM(s) Oral every 8 hours PRN        Physical exam:  Pt is aaox3  Pt in no acute distress  Psych: normal affect  Resp: CTAB, saturating well on RA   CVS: S1S2(+)  ABD:, soft, non distended, no rebound, no guarding, no rigidity, no skin changes to exam.   EXT: 5/5 strength in b/l LE & UE, sensormotor function intact, 2+ DP pulses b/l, no calf tenderness or edema to exam b/l, on VTE prophylaxis  Skin: no adverse skin changes to exam

## 2025-03-03 NOTE — DISCHARGE NOTE PROVIDER - NSDCMRMEDTOKEN_GEN_ALL_CORE_FT
acetaminophen 325 mg oral tablet: 3 tab(s) orally every 6 hours  albuterol 90 mcg/inh inhalation aerosol: 1 puff(s) inhaled every 6 hours As needed Shortness of Breath and/or Wheezing  aspirin 81 mg oral delayed release tablet: 1 tab(s) orally once a day  atorvastatin 20 mg oral tablet: 1 tab(s) orally once a day (in the morning)  Calquence 100 mg oral tablet: 1 tab(s) orally once a day  carvedilol 3.125 mg oral tablet: 1 tab(s) orally 2 times a day  Centrum Adults oral tablet: 1 tab(s) orally once a day  Eliquis 2.5 mg oral tablet: 1 tab(s) orally 2 times a day  Farxiga 10 mg oral tablet: 1 tab(s) orally once a day  ferrous sulfate: orally once a day  furosemide 20 mg oral tablet: 1 tab(s) orally once a day  gabapentin 300 mg oral capsule: 1 cap(s) orally 2 times a day  pantoprazole 40 mg oral delayed release tablet: 1 tab(s) orally once a day (before a meal)  sacubitril-valsartan 24 mg-26 mg oral tablet: 1 tab(s) orally 2 times a day  senna leaf extract oral tablet: 2 tab(s) orally once a day (at bedtime) As needed Constipation  spironolactone 25 mg oral tablet: 1 tab(s) orally once a day (in the morning)  traMADol 50 mg oral tablet: 1 tab(s) orally once a day as needed for   acetaminophen 325 mg oral tablet: 3 tab(s) orally every 6 hours  albuterol 90 mcg/inh inhalation aerosol: 1 puff(s) inhaled every 6 hours As needed Shortness of Breath and/or Wheezing  aspirin 81 mg oral delayed release tablet: 1 tab(s) orally once a day  atorvastatin 20 mg oral tablet: 1 tab(s) orally once a day (in the morning)  Calquence 100 mg oral tablet: 1 tab(s) orally once a day  carvedilol 3.125 mg oral tablet: 1 tab(s) orally 2 times a day  Centrum Adults oral tablet: 1 tab(s) orally once a day  Eliquis 2.5 mg oral tablet: 1 tab(s) orally 2 times a day  Farxiga 10 mg oral tablet: 1 tab(s) orally once a day  ferrous sulfate: orally once a day  gabapentin 300 mg oral capsule: 1 cap(s) orally 2 times a day  pantoprazole 40 mg oral delayed release tablet: 1 tab(s) orally once a day (before a meal)  senna leaf extract oral tablet: 2 tab(s) orally once a day (at bedtime) As needed Constipation  traMADol 50 mg oral tablet: 1 tab(s) orally once a day as needed for   acetaminophen 325 mg oral tablet: 3 tab(s) orally every 6 hours  albuterol 90 mcg/inh inhalation aerosol: 1 puff(s) inhaled every 6 hours As needed Shortness of Breath and/or Wheezing  aspirin 81 mg oral delayed release tablet: 1 tab(s) orally once a day  atorvastatin 20 mg oral tablet: 1 tab(s) orally once a day (in the morning)  Calquence 100 mg oral tablet: 1 tab(s) orally once a day  carvedilol 3.125 mg oral tablet: 1 tab(s) orally 2 times a day  Centrum Adults oral tablet: 1 tab(s) orally once a day  Eliquis 2.5 mg oral tablet: 1 tab(s) orally 2 times a day  ferrous sulfate: orally once a day  gabapentin 300 mg oral capsule: 1 cap(s) orally 2 times a day  pantoprazole 40 mg oral delayed release tablet: 1 tab(s) orally once a day (before a meal)  senna leaf extract oral tablet: 2 tab(s) orally once a day (at bedtime) As needed Constipation  traMADol 50 mg oral tablet: 1 tab(s) orally once a day as needed for   02-Nov-2021 14:52

## 2025-03-03 NOTE — PROGRESS NOTE ADULT - ASSESSMENT
88y/o F with PMHx of CAD, HTN, HLD, CHF, hypothyroidism, and Waldenstrom's lymphoma admitted s/p fall     # Waldenstroms Lymphoma   - [place] at [facility]  - on Calquence 100mg daily    # fall and LUE fracture   S/p recent fall resulted in Multiple Fx, unable to ambulate   Acute/ subacute L pubic rami FX  T9-10  compression FX  - likely old, seen on outside CT from [facility]   L olecranon FX - LUE NWB  management as per Trauma/Ortho  no surgical intervention planned - arm remains in sling and pain controlled     Elevated BUN/CR,  prerenal due to overdiuresis and dehydration - resolved with IVF hydration - cr 0.9 today     Incidental finding of 9mm pancreatic body cystic lesion - recommend f/u outpatient at [facility].  may need MRI     DVT PPX: Lovenox     eventual plan d/c to rehab- dispo planning

## 2025-03-04 ENCOUNTER — TRANSCRIPTION ENCOUNTER (OUTPATIENT)
Age: 89
End: 2025-03-04

## 2025-03-04 VITALS
HEART RATE: 66 BPM | SYSTOLIC BLOOD PRESSURE: 141 MMHG | DIASTOLIC BLOOD PRESSURE: 57 MMHG | OXYGEN SATURATION: 95 % | TEMPERATURE: 98 F | RESPIRATION RATE: 18 BRPM

## 2025-03-04 LAB
ANION GAP SERPL CALC-SCNC: 4 MMOL/L — LOW (ref 5–17)
BUN SERPL-MCNC: 32 MG/DL — HIGH (ref 7–23)
CALCIUM SERPL-MCNC: 9.5 MG/DL — SIGNIFICANT CHANGE UP (ref 8.5–10.1)
CHLORIDE SERPL-SCNC: 112 MMOL/L — HIGH (ref 96–108)
CO2 SERPL-SCNC: 24 MMOL/L — SIGNIFICANT CHANGE UP (ref 22–31)
CREAT SERPL-MCNC: 0.79 MG/DL — SIGNIFICANT CHANGE UP (ref 0.5–1.3)
EGFR: 71 ML/MIN/1.73M2 — SIGNIFICANT CHANGE UP
EGFR: 71 ML/MIN/1.73M2 — SIGNIFICANT CHANGE UP
GLUCOSE SERPL-MCNC: 101 MG/DL — HIGH (ref 70–99)
HCT VFR BLD CALC: 27.3 % — LOW (ref 34.5–45)
HGB BLD-MCNC: 8.6 G/DL — LOW (ref 11.5–15.5)
MAGNESIUM SERPL-MCNC: 2.2 MG/DL — SIGNIFICANT CHANGE UP (ref 1.6–2.6)
MCHC RBC-ENTMCNC: 31.5 G/DL — LOW (ref 32–36)
MCHC RBC-ENTMCNC: 31.7 PG — SIGNIFICANT CHANGE UP (ref 27–34)
MCV RBC AUTO: 100.7 FL — HIGH (ref 80–100)
NRBC # BLD AUTO: 0 K/UL — SIGNIFICANT CHANGE UP (ref 0–0)
NRBC # FLD: 0 K/UL — SIGNIFICANT CHANGE UP (ref 0–0)
NRBC BLD AUTO-RTO: 0 /100 WBCS — SIGNIFICANT CHANGE UP (ref 0–0)
PHOSPHATE SERPL-MCNC: 2.8 MG/DL — SIGNIFICANT CHANGE UP (ref 2.5–4.5)
PLATELET # BLD AUTO: 237 K/UL — SIGNIFICANT CHANGE UP (ref 150–400)
PMV BLD: 11.2 FL — SIGNIFICANT CHANGE UP (ref 7–13)
POTASSIUM SERPL-MCNC: 4.4 MMOL/L — SIGNIFICANT CHANGE UP (ref 3.5–5.3)
POTASSIUM SERPL-SCNC: 4.4 MMOL/L — SIGNIFICANT CHANGE UP (ref 3.5–5.3)
RBC # BLD: 2.71 M/UL — LOW (ref 3.8–5.2)
RBC # FLD: 15.8 % — HIGH (ref 10.3–14.5)
SODIUM SERPL-SCNC: 140 MMOL/L — SIGNIFICANT CHANGE UP (ref 135–145)
WBC # BLD: 7.38 K/UL — SIGNIFICANT CHANGE UP (ref 3.8–10.5)
WBC # FLD AUTO: 7.38 K/UL — SIGNIFICANT CHANGE UP (ref 3.8–10.5)

## 2025-03-04 PROCEDURE — 99232 SBSQ HOSP IP/OBS MODERATE 35: CPT

## 2025-03-04 PROCEDURE — 99239 HOSP IP/OBS DSCHRG MGMT >30: CPT

## 2025-03-04 RX ORDER — DAPAGLIFLOZIN 5 MG/1
1 TABLET, FILM COATED ORAL
Refills: 0 | DISCHARGE

## 2025-03-04 RX ORDER — SPIRONOLACTONE 25 MG
1 TABLET ORAL
Refills: 0 | DISCHARGE

## 2025-03-04 RX ADMIN — APIXABAN 2.5 MILLIGRAM(S): 2.5 TABLET, FILM COATED ORAL at 10:47

## 2025-03-04 RX ADMIN — Medication 975 MILLIGRAM(S): at 06:02

## 2025-03-04 RX ADMIN — Medication 975 MILLIGRAM(S): at 12:46

## 2025-03-04 RX ADMIN — Medication 40 MILLIGRAM(S): at 06:03

## 2025-03-04 RX ADMIN — Medication 975 MILLIGRAM(S): at 00:01

## 2025-03-04 RX ADMIN — CARVEDILOL 3.12 MILLIGRAM(S): 3.12 TABLET, FILM COATED ORAL at 10:47

## 2025-03-04 RX ADMIN — GABAPENTIN 300 MILLIGRAM(S): 400 CAPSULE ORAL at 10:47

## 2025-03-04 RX ADMIN — Medication 100 MICROGRAM(S): at 06:02

## 2025-03-04 RX ADMIN — Medication 81 MILLIGRAM(S): at 10:44

## 2025-03-04 NOTE — PROGRESS NOTE ADULT - SUBJECTIVE AND OBJECTIVE BOX
CC: Patient is a 89y old  Female who presents with a chief complaint of Fall - Pelvic Fx (04 Mar 2025 09:08)    Progress Note    Subjective:  Pt seen and examined at bedside with chaperone. Pt is AAOx3, pt in no acute distress. Pt denied c/o fever, chills, chest pain, SOB, abd pain, N/V/D, extremity pain or dysfunction, hemoptysis, hematemesis, hematuria, hematochexia, headache, diplopia, vertigo, dizzyness. Pt tolerating diet, (+) void, (+) ambulation, (+) bowel function    ROS:  otherwise as abovementioned ROS    Vital Signs Last 24 Hrs  T(C): 36.5 (04 Mar 2025 07:39), Max: 36.9 (03 Mar 2025 16:05)  T(F): 97.7 (04 Mar 2025 07:39), Max: 98.4 (03 Mar 2025 16:05)  HR: 60 (04 Mar 2025 07:39) (58 - 68)  BP: 122/53 (04 Mar 2025 07:39) (107/62 - 122/53)  BP(mean): 73 (04 Mar 2025 07:39) (73 - 73)  RR: 18 (04 Mar 2025 07:39) (16 - 18)  SpO2: 94% (04 Mar 2025 07:39) (94% - 98%)    Parameters below as of 04 Mar 2025 07:39  Patient On (Oxygen Delivery Method): room air        Labs:                          8.6    7.38  )-----------( 237      ( 04 Mar 2025 08:32 )             27.3     CBC Full  -  ( 04 Mar 2025 08:32 )  WBC Count : 7.38 K/uL  RBC Count : 2.71 M/uL  Hemoglobin : 8.6 g/dL  Hematocrit : 27.3 %  Platelet Count - Automated : 237 K/uL  Mean Cell Volume : 100.7 fl  Mean Cell Hemoglobin : 31.7 pg  Mean Cell Hemoglobin Concentration : 31.5 g/dL  Auto Neutrophil # : x  Auto Lymphocyte # : x  Auto Monocyte # : x  Auto Eosinophil # : x  Auto Basophil # : x  Auto Neutrophil % : x  Auto Lymphocyte % : x  Auto Monocyte % : x  Auto Eosinophil % : x  Auto Basophil % : x    03-04    140  |  112[H]  |  32[H]  ----------------------------<  101[H]  4.4   |  24  |  0.79    Ca    9.5      04 Mar 2025 08:32  Phos  2.8     03-04  Mg     2.2     03-04              Meds:  acetaminophen     Tablet .. 975 milliGRAM(s) Oral every 6 hours  albuterol    90 MICROgram(s) HFA Inhaler 1 Puff(s) Inhalation every 6 hours PRN  apixaban 2.5 milliGRAM(s) Oral every 12 hours  aspirin enteric coated 81 milliGRAM(s) Oral daily  atorvastatin 20 milliGRAM(s) Oral at bedtime  benzocaine/menthol Lozenge 1 Lozenge Oral four times a day PRN  Calquence (Acalabrutinib) 100mg 1 Tablet(s) 100 milliGRAM(s) Oral daily  carvedilol 3.125 milliGRAM(s) Oral every 12 hours  gabapentin 300 milliGRAM(s) Oral two times a day  levothyroxine 100 MICROGram(s) Oral daily  morphine  - Injectable 2 milliGRAM(s) IV Push every 4 hours PRN  ondansetron Injectable 4 milliGRAM(s) IV Push every 6 hours PRN  pantoprazole    Tablet 40 milliGRAM(s) Oral before breakfast  senna 2 Tablet(s) Oral at bedtime PRN  traMADol 25 milliGRAM(s) Oral every 8 hours PRN  traMADol 50 milliGRAM(s) Oral every 8 hours PRN      Radiology:      Physical exam:  GCS of 15  Pt is aaox3  Pt in no acute distress  Airway is patent  Breathing is symmetric and unlabored  Neuro: CN II-XII grossly intact  Psych: normal affect  HEENT: normocephalic, SHANNAN, EOM wnl, no gross craniofacial bony pathology to exam  Neck: No tracheal deviation, no JVD, no crepitus, no ecchymosis, no hematoma  Spine: +paraspinal thoracic spine ttp  Chest: No gross rib or sternal pathology or tenderness to exam, no crepitus, no ecchymosis, no hematoma  Resp: CTAB  CVS: S1S2(+)  ABD: bowel sounds (+), soft, nontender, non distended, no rebound, no guarding, no rigidity, no pelvic instability to exam  EXT: +TTP to left lateral groin, 3/5 strength to LLE with pain; no calf tenderness or edema to exam b/l, pt has good capillary refill in all digits. Sensoromotor function grossly intact, on VTE prophylaxis  Skin: no adverse skin changes to exam

## 2025-03-04 NOTE — DISCHARGE NOTE NURSING/CASE MANAGEMENT/SOCIAL WORK - PATIENT PORTAL LINK FT
You can access the FollowMyHealth Patient Portal offered by [facility] by registering at the following website: [email or web address]. By joining [facility]’s FollowMyHealth portal, you will also be able to view your health information using other applications (apps) compatible with our system.

## 2025-03-04 NOTE — PROGRESS NOTE ADULT - SUBJECTIVE AND OBJECTIVE BOX
INTERVAL HPI/OVERNIGHT EVENTS:  Patient S&E at bedside. No o/n events,   no pain this am   plan for dc to rehab today   vss on ra     PAST MEDICAL & SURGICAL HISTORY:  Hypertension      Waldenstrom's disease      HLD (hyperlipidemia)      CAD (coronary artery disease)      Hypothyroidism      S/P hysterectomy          FAMILY HISTORY:  No pertinent family history in first degree relatives        VITAL SIGNS:  T(F): 97.7 (03-04-25 @ 07:39)  HR: 60 (03-04-25 @ 07:39)  BP: 122/53 (03-04-25 @ 07:39)  RR: 18 (03-04-25 @ 07:39)  SpO2: 94% (03-04-25 @ 07:39)  Wt(kg): --    PHYSICAL EXAM:    Constitutional: NAD  Eyes: EOMI,   Respiratory: CTA b/l, good air entry b/l  Cardiovascular: RRR,   Gastrointestinal: soft, NTND,   Extremities: LUE in sling w/o significant pain   Neurological: AAOx3      MEDICATIONS  (STANDING):  acetaminophen     Tablet .. 975 milliGRAM(s) Oral every 6 hours  apixaban 2.5 milliGRAM(s) Oral every 12 hours  aspirin enteric coated 81 milliGRAM(s) Oral daily  atorvastatin 20 milliGRAM(s) Oral at bedtime  Calquence (Acalabrutinib) 100mg 1 Tablet(s) 100 milliGRAM(s) Oral daily  carvedilol 3.125 milliGRAM(s) Oral every 12 hours  gabapentin 300 milliGRAM(s) Oral two times a day  levothyroxine 100 MICROGram(s) Oral daily  pantoprazole    Tablet 40 milliGRAM(s) Oral before breakfast    MEDICATIONS  (PRN):  albuterol    90 MICROgram(s) HFA Inhaler 1 Puff(s) Inhalation every 6 hours PRN Shortness of Breath and/or Wheezing  benzocaine/menthol Lozenge 1 Lozenge Oral four times a day PRN Sore Throat  morphine  - Injectable 2 milliGRAM(s) IV Push every 4 hours PRN brekathrough pain  ondansetron Injectable 4 milliGRAM(s) IV Push every 6 hours PRN Nausea  senna 2 Tablet(s) Oral at bedtime PRN Constipation  traMADol 25 milliGRAM(s) Oral every 8 hours PRN Moderate Pain (4 - 6)  traMADol 50 milliGRAM(s) Oral every 8 hours PRN Severe Pain (7 - 10)      Allergies    No Known Allergies    Intolerances        LABS:                        8.6    7.38  )-----------( 237      ( 04 Mar 2025 08:32 )             27.3     03-03    142  |  112[H]  |  30[H]  ----------------------------<  140[H]  4.9   |  23  |  0.86    Ca    9.7      03 Mar 2025 09:21  Phos  2.7     03-03  Mg     2.2     03-03        Urinalysis Basic - ( 03 Mar 2025 09:21 )    Color: x / Appearance: x / SG: x / pH: x  Gluc: 140 mg/dL / Ketone: x  / Bili: x / Urobili: x   Blood: x / Protein: x / Nitrite: x   Leuk Esterase: x / RBC: x / WBC x   Sq Epi: x / Non Sq Epi: x / Bacteria: x        RADIOLOGY & ADDITIONAL TESTS:  Studies reviewed.

## 2025-03-04 NOTE — DISCHARGE NOTE NURSING/CASE MANAGEMENT/SOCIAL WORK - FINANCIAL ASSISTANCE
[facility] provides services at a reduced cost to those who are determined to be eligible through [facility]’s financial assistance program. Information regarding [facility]’s financial assistance program can be found by going to [email or web address] or by calling [phone].
with patient

## 2025-03-04 NOTE — PROGRESS NOTE ADULT - PROVIDER SPECIALTY LIST ADULT
Heme/Onc
Hospitalist
Trauma Surgery
Hospitalist
Trauma Surgery
Hospitalist
Hospitalist
Trauma Surgery
Heme/Onc
Heme/Onc
Hospitalist
Trauma Surgery

## 2025-03-04 NOTE — PROGRESS NOTE ADULT - SUBJECTIVE AND OBJECTIVE BOX
Chart and meds reviewed.  Patient seen and examined.    3/4-  Patient was seen and examined. VSS. No acute overnight events. Denies fever, pain or SOB. Tolerating diet. Wants to go to rehab-pending insurance authrization     All other systems reviewed and found to be negative with the exception of what has been described above.    MEDICATIONS  (STANDING):  acetaminophen     Tablet .. 975 milliGRAM(s) Oral every 6 hours  apixaban 2.5 milliGRAM(s) Oral every 12 hours  aspirin enteric coated 81 milliGRAM(s) Oral daily  atorvastatin 20 milliGRAM(s) Oral at bedtime  Calquence (Acalabrutinib) 100mg 1 Tablet(s) 100 milliGRAM(s) Oral daily  carvedilol 3.125 milliGRAM(s) Oral every 12 hours  gabapentin 300 milliGRAM(s) Oral two times a day  levothyroxine 100 MICROGram(s) Oral daily  pantoprazole    Tablet 40 milliGRAM(s) Oral before breakfast    MEDICATIONS  (PRN):  albuterol    90 MICROgram(s) HFA Inhaler 1 Puff(s) Inhalation every 6 hours PRN Shortness of Breath and/or Wheezing  benzocaine/menthol Lozenge 1 Lozenge Oral four times a day PRN Sore Throat  morphine  - Injectable 2 milliGRAM(s) IV Push every 4 hours PRN brekathrough pain  ondansetron Injectable 4 milliGRAM(s) IV Push every 6 hours PRN Nausea  senna 2 Tablet(s) Oral at bedtime PRN Constipation  traMADol 25 milliGRAM(s) Oral every 8 hours PRN Moderate Pain (4 - 6)  traMADol 50 milliGRAM(s) Oral every 8 hours PRN Severe Pain (7 - 10)        Vital Signs Last 24 Hrs  T(C): 36.5 (04 Mar 2025 07:39), Max: 36.9 (03 Mar 2025 16:05)  T(F): 97.7 (04 Mar 2025 07:39), Max: 98.4 (03 Mar 2025 16:05)  HR: 67 (04 Mar 2025 10:45) (58 - 68)  BP: 115/62 (04 Mar 2025 10:45) (107/62 - 122/53)  BP(mean): 76 (04 Mar 2025 10:45) (73 - 76)  RR: 18 (04 Mar 2025 07:39) (16 - 18)  SpO2: 94% (04 Mar 2025 07:39) (94% - 98%)    Parameters below as of 04 Mar 2025 07:39  Patient On (Oxygen Delivery Method): room air            PE:  Constitutional: NAD, laying in bed  HEENT: NC/AT  Back: no tenderness  Respiratory: respirations even and non labored, LCTA  Cardiovascular: S1S2 regular, no murmurs  Abdomen: soft, not tender, not distended, positive BS  Genitourinary: voiding  Musculoskeletal: no muscle tenderness, no joint swelling or tenderness  Extremities: no pedal edema   Neurological: no focal deficits      LABS:             03-03    142  |  112[H]  |  30[H]  ----------------------------<  140[H]  4.9   |  23  |  0.86    Ca    9.7      03 Mar 2025 09:21  Phos  2.7     03-03  Mg     2.2     03-03                          9.7    8.67  )-----------( 226      ( 03 Mar 2025 09:21 )             30.3

## 2025-03-04 NOTE — PROGRESS NOTE ADULT - ASSESSMENT
89y/o F with PMHx of CAD, HTN, HLD, hypothyroidism, and Waldenstrom's disease who was BIBEMS  to  ED with son at bedside c/o left leg weakness and left upper leg pain s/p mechanical fall three days ago - 2/24/24 (no HS / LOC per pt). Pt is now requiring assistance with ambulation (normally ambulatory at baseline). P reports having  portable x-ray done at home on Tuesday which showed a fracture in her left arm but was negative for any findings in her hip/legs. Pt f/u outpt with orthopedic surgeon (Dr Yang) yesterday and Lt arm was placed in a splint. ER diagnostic imaging acute/subacute left pubic fractures as discussed, age indeterminate severe  T9 and T10 compression deformities, and 9 mm pancreatic body cystic lesion.  Per chart review; + APT (ASA 81), No AC therapy    #Mechanical Fall 2/24  #Lt Elbow Fracture  #Lt Pelvic Fracture  #Age indtm T9 & T10 Compression Deformity  #Pancreatic Body Cystic Lesion  #BONNIE (Baseline Cr ~0.7)  #Leukocytosis  #CAD  #HTN  #Hypothyroidism  #Waldenstrom's disease    Plan:  Admit to Tele Floor under care of Trauma Surgery  Fall sustained 2/24/2024 without repeat trauma  -Repeat Xray LUE to eval extent of injury - not necessary as per ortho (done recently outpt)  -PRN analgesic and antiemetic therapy  -PT eval     Leukocytosis - likely reactive; resolved.  Monitor I/O's, UO. Replete lytes PRN  Cont IVF hydration   Avoid nephrotoxic medication mgmt   Holding Lasix and Entresto in setting of acute BONNIE    Consultations;  -Hospitalist: Co-mgmt appreciated  -Orthopedic surgery: no surgical intervention, WBAT LLE, NWB LUE, f/u [facility] outpt  -Neurosx/spine consult: known compression fractures, follows at [facility], has TLSO brace at home to be worn when OOB, f/u outpt  -GI: Eval pancreatic cystic lesion - f/u outpt   -Cardiology consult: cont low dose eliquis if hgb stable, resume ASA 81 and lipitor, hold coreg/farxiga/entresto due to hypotension (can resume outpt)  --resumed coreg over the weekend, BP stable  -Heme/onc consult: f/u [facility], continue home meds    Cont DVT ppx (eliquis, ASA). GI ppx   Cont IS and deep breathing exercises  Cont diet as tolerated   Cont appropriate home medication mgmt  PT - JENNIFER  CM - dispo pending auth, likely today    Case and plan discussed with surgical attending

## 2025-03-04 NOTE — PROGRESS NOTE ADULT - ASSESSMENT
89 F with Hx of CAD, HTN, HLD, CHF, hypothyroidism, and Waldenstrom's disease admitted with a fall resulting multiple fractures and admitted to the trauma service.    S/p Fall resulting in Multiple Fractures - Acute/subacute L pubic rami FX, T9-10 compression FX, left Olecranon FX  -  management as per Trauma team  -  ALEXE NWB  -  LUE in splint and sling per ortho  -  pain control  -  incentive spirometry  -  PT as tolerated  -  TLSO brace when out of bed- intermittenly compliant   -  fall precautions    Hypotension  -  likely 2/2 hypovolemia and dehydration  -  s/p fluid bolus  -  resolved    BONNIE  -  Cr 1.51 on admission  -  resolved    Chronic HFrEF  -  clinically stable without evidence of decompensation  -  continue Coreg  -  diuretics on hold at this time due to recent hypotension  -  was on at home:  Farxiga, Lasix, Spironolactone, Entresto - > resume at discharge  -  monitor daily weights  -  strict Is/Os  -  appreciate cardio consult    CAD  -  stable, continue Coreg, ASA, statin    Acute on Chronic Anemia  -  H/H stable     Hyperlipidemia  -  continue statin    Waldenstrom's disease with Chronic Anemia  -  appreciate heme consult  -  continue Calquence inpatient per heme/onc  -  H/H stable    Hypothyroidism  -  continue Levothyroxine     Incidental finding of 9mm pancreatic body cystic lesion  -  outpatient follow up  -  seen by Dr. Motley    DVT prophylaxis  -  venodynes and Eliquis      Case d/w team on IDR.   No medical contraindication for dc.

## 2025-03-04 NOTE — PROGRESS NOTE ADULT - REASON FOR ADMISSION
Fall - Pelvic Fx

## 2025-03-04 NOTE — PROGRESS NOTE ADULT - ASSESSMENT
89y/o F with PMHx of CAD, HTN, HLD, CHF, hypothyroidism, and Waldenstrom's lymphoma admitted s/p fall     # Waldenstroms Lymphoma   - followed at [facility]  - on Calquence 100mg daily  - to continue on dc     # fall and LUE fracture   S/p recent fall resulted in Multiple Fx, unable to ambulate   Acute/ subacute L pubic rami FX  T9-10  compression FX  - likely old, seen on outside CT from [facility]   L olecranon FX - LUE NWB  management as per Trauma/Ortho  no surgical intervention planned - arm remains in sling and pain controlled     Incidental finding of 9mm pancreatic body cystic lesion - recommend f/u outpatient at [facility].  may need MRI     DVT PPX: Lovenox     plan d/c to rehab- dispo planning

## 2025-03-11 DIAGNOSIS — E78.5 HYPERLIPIDEMIA, UNSPECIFIED: ICD-10-CM

## 2025-03-11 DIAGNOSIS — I50.22 CHRONIC SYSTOLIC (CONGESTIVE) HEART FAILURE: ICD-10-CM

## 2025-03-11 DIAGNOSIS — I95.89 OTHER HYPOTENSION: ICD-10-CM

## 2025-03-11 DIAGNOSIS — I25.10 ATHEROSCLEROTIC HEART DISEASE OF NATIVE CORONARY ARTERY WITHOUT ANGINA PECTORIS: ICD-10-CM

## 2025-03-11 DIAGNOSIS — I11.0 HYPERTENSIVE HEART DISEASE WITH HEART FAILURE: ICD-10-CM

## 2025-03-11 DIAGNOSIS — I48.0 PAROXYSMAL ATRIAL FIBRILLATION: ICD-10-CM

## 2025-03-11 DIAGNOSIS — D72.829 ELEVATED WHITE BLOOD CELL COUNT, UNSPECIFIED: ICD-10-CM
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
## 2025-03-11 DIAGNOSIS — E83.39 OTHER DISORDERS OF PHOSPHORUS METABOLISM: ICD-10-CM

## 2025-03-11 DIAGNOSIS — S32.502A UNSPECIFIED FRACTURE OF LEFT PUBIS, INITIAL ENCOUNTER FOR CLOSED FRACTURE: ICD-10-CM

## 2025-03-11 DIAGNOSIS — E86.1 HYPOVOLEMIA: ICD-10-CM

## 2025-03-11 DIAGNOSIS — Z79.890 HORMONE REPLACEMENT THERAPY: ICD-10-CM

## 2025-03-11 DIAGNOSIS — S52.022A DISPLACED FRACTURE OF OLECRANON PROCESS WITHOUT INTRAARTICULAR EXTENSION OF LEFT ULNA, INITIAL ENCOUNTER FOR CLOSED FRACTURE: ICD-10-CM

## 2025-03-11 DIAGNOSIS — Z79.622 LONG TERM (CURRENT) USE OF JANUS KINASE INHIBITOR: ICD-10-CM

## 2025-03-11 DIAGNOSIS — Z95.0 PRESENCE OF CARDIAC PACEMAKER: ICD-10-CM

## 2025-03-11 DIAGNOSIS — D63.8 ANEMIA IN OTHER CHRONIC DISEASES CLASSIFIED ELSEWHERE: ICD-10-CM

## 2025-03-11 DIAGNOSIS — Z79.01 LONG TERM (CURRENT) USE OF ANTICOAGULANTS: ICD-10-CM

## 2025-03-11 DIAGNOSIS — K86.2 CYST OF PANCREAS: ICD-10-CM

## 2025-03-11 DIAGNOSIS — E86.0 DEHYDRATION: ICD-10-CM

## 2025-03-11 DIAGNOSIS — E03.9 HYPOTHYROIDISM, UNSPECIFIED: ICD-10-CM

## 2025-03-11 DIAGNOSIS — C88.00 WALDENSTROM MACROGLOBULINEMIA NOT HAVING ACHIEVED REMISSION: ICD-10-CM

## 2025-03-11 DIAGNOSIS — N17.9 ACUTE KIDNEY FAILURE, UNSPECIFIED: ICD-10-CM

## 2025-03-11 DIAGNOSIS — Z79.82 LONG TERM (CURRENT) USE OF ASPIRIN: ICD-10-CM

## 2025-03-12 ENCOUNTER — APPOINTMENT (OUTPATIENT)
Dept: ORTHOPEDIC SURGERY | Facility: CLINIC | Age: 89
End: 2025-03-12

## 2025-03-28 ENCOUNTER — NON-APPOINTMENT (OUTPATIENT)
Age: 89
End: 2025-03-28

## 2025-03-31 ENCOUNTER — APPOINTMENT (OUTPATIENT)
Dept: ELECTROPHYSIOLOGY | Facility: CLINIC | Age: 89
End: 2025-03-31
Payer: MEDICARE

## 2025-03-31 ENCOUNTER — NON-APPOINTMENT (OUTPATIENT)
Age: 89
End: 2025-03-31

## 2025-03-31 ENCOUNTER — APPOINTMENT (OUTPATIENT)
Dept: ELECTROPHYSIOLOGY | Facility: CLINIC | Age: 89
End: 2025-03-31

## 2025-03-31 PROCEDURE — 93296 REM INTERROG EVL PM/IDS: CPT

## 2025-03-31 PROCEDURE — 93294 REM INTERROG EVL PM/LDLS PM: CPT

## 2025-05-03 NOTE — PATIENT PROFILE ADULT - NSPROMEDSDISPOSITION_GEN_A_NUR
Medication given per MAR order. Education regarding medication provided.    Tolerated well with no complaints.     Instructed patient to utilize the call light if she needs anything further.      
Paperwork read with patient making note of follow up    IV taken out     Armband removed and disposed of in shredder.     Patient has no further questions at this time.     Will discharge from system.    
Few pills in plastic bag, put into patient's med drawer.

## 2025-06-10 ENCOUNTER — APPOINTMENT (OUTPATIENT)
Dept: ELECTROPHYSIOLOGY | Facility: CLINIC | Age: 89
End: 2025-06-10
Payer: MEDICARE

## 2025-06-10 VITALS
WEIGHT: 150 LBS | BODY MASS INDEX: 27.6 KG/M2 | HEIGHT: 62 IN | DIASTOLIC BLOOD PRESSURE: 61 MMHG | SYSTOLIC BLOOD PRESSURE: 130 MMHG | OXYGEN SATURATION: 96 % | HEART RATE: 63 BPM

## 2025-06-10 PROBLEM — I48.91 ATRIAL FIBRILLATION: Status: ACTIVE | Noted: 2025-06-10

## 2025-06-10 PROCEDURE — 99214 OFFICE O/P EST MOD 30 MIN: CPT

## 2025-06-10 PROCEDURE — 93281 PM DEVICE PROGR EVAL MULTI: CPT

## 2025-06-10 PROCEDURE — 93000 ELECTROCARDIOGRAM COMPLETE: CPT | Mod: 59

## 2025-06-10 PROCEDURE — G2211 COMPLEX E/M VISIT ADD ON: CPT

## 2025-06-10 RX ORDER — ACALABRUTINIB 100 MG/1
100 TABLET, FILM COATED ORAL
Refills: 0 | Status: ACTIVE | COMMUNITY

## 2025-09-09 ENCOUNTER — APPOINTMENT (OUTPATIENT)
Dept: ELECTROPHYSIOLOGY | Facility: CLINIC | Age: 89
End: 2025-09-09
Payer: MEDICARE

## 2025-09-09 ENCOUNTER — NON-APPOINTMENT (OUTPATIENT)
Age: 89
End: 2025-09-09

## 2025-09-09 PROCEDURE — 93294 REM INTERROG EVL PM/LDLS PM: CPT

## 2025-09-09 PROCEDURE — 93296 REM INTERROG EVL PM/IDS: CPT
